# Patient Record
Sex: FEMALE | Race: WHITE | NOT HISPANIC OR LATINO | Employment: FULL TIME
[De-identification: names, ages, dates, MRNs, and addresses within clinical notes are randomized per-mention and may not be internally consistent; named-entity substitution may affect disease eponyms.]

---

## 2021-01-29 ENCOUNTER — TRANSCRIBE ORDERS (OUTPATIENT)
Dept: OTHER | Age: 61
End: 2021-01-29

## 2021-01-29 DIAGNOSIS — G25.0 ESSENTIAL TREMOR: Primary | ICD-10-CM

## 2021-03-05 ENCOUNTER — IMMUNIZATION (OUTPATIENT)
Dept: NURSING | Facility: CLINIC | Age: 61
End: 2021-03-05
Payer: COMMERCIAL

## 2021-03-05 PROCEDURE — 91301 PR COVID VAC MODERNA 100 MCG/0.5 ML IM: CPT

## 2021-03-05 PROCEDURE — 0011A PR COVID VAC MODERNA 100 MCG/0.5 ML IM: CPT

## 2021-03-13 ENCOUNTER — HEALTH MAINTENANCE LETTER (OUTPATIENT)
Age: 61
End: 2021-03-13

## 2021-04-02 ENCOUNTER — IMMUNIZATION (OUTPATIENT)
Dept: NURSING | Facility: CLINIC | Age: 61
End: 2021-04-02
Attending: INTERNAL MEDICINE
Payer: COMMERCIAL

## 2021-04-02 PROCEDURE — 91301 PR COVID VAC MODERNA 100 MCG/0.5 ML IM: CPT

## 2021-04-02 PROCEDURE — 0012A PR COVID VAC MODERNA 100 MCG/0.5 ML IM: CPT

## 2021-10-23 ENCOUNTER — HEALTH MAINTENANCE LETTER (OUTPATIENT)
Age: 61
End: 2021-10-23

## 2021-12-25 ENCOUNTER — HOSPITAL ENCOUNTER (EMERGENCY)
Facility: CLINIC | Age: 61
Discharge: HOME | End: 2021-12-26
Attending: EMERGENCY MEDICINE | Admitting: EMERGENCY MEDICINE
Payer: COMMERCIAL

## 2021-12-25 ENCOUNTER — APPOINTMENT (OUTPATIENT)
Dept: CT IMAGING | Facility: CLINIC | Age: 61
End: 2021-12-25
Attending: EMERGENCY MEDICINE
Payer: COMMERCIAL

## 2021-12-25 DIAGNOSIS — N20.1 URETEROLITHIASIS: ICD-10-CM

## 2021-12-25 DIAGNOSIS — N23 RENAL COLIC: ICD-10-CM

## 2021-12-25 DIAGNOSIS — R82.81 PYURIA: ICD-10-CM

## 2021-12-25 PROBLEM — R93.1 DECREASED CARDIAC EJECTION FRACTION: Status: ACTIVE | Noted: 2019-10-13

## 2021-12-25 PROBLEM — G25.0 ESSENTIAL TREMOR: Status: ACTIVE | Noted: 2018-02-14

## 2021-12-25 PROBLEM — E78.00 PURE HYPERCHOLESTEROLEMIA: Status: ACTIVE | Noted: 2020-07-30

## 2021-12-25 PROBLEM — F33.2 MDD (MAJOR DEPRESSIVE DISORDER), RECURRENT SEVERE, WITHOUT PSYCHOSIS (H): Status: ACTIVE | Noted: 2018-02-14

## 2021-12-25 PROBLEM — I47.10 PAROXYSMAL SVT (SUPRAVENTRICULAR TACHYCARDIA) (H): Status: ACTIVE | Noted: 2019-10-13

## 2021-12-25 PROBLEM — H52.213 IRREGULAR ASTIGMATISM, BILATERAL: Status: ACTIVE | Noted: 2021-02-24

## 2021-12-25 PROBLEM — R73.03 PREDIABETES: Status: ACTIVE | Noted: 2020-07-30

## 2021-12-25 PROBLEM — H52.4 PRESBYOPIA: Status: ACTIVE | Noted: 2021-02-24

## 2021-12-25 PROBLEM — I47.19 ATRIAL TACHYCARDIA (H): Status: ACTIVE | Noted: 2019-10-13

## 2021-12-25 PROBLEM — H52.13 MYOPIA, BILATERAL: Status: ACTIVE | Noted: 2021-02-24

## 2021-12-25 LAB
ALBUMIN SERPL-MCNC: 3.9 G/DL (ref 3.4–5)
ALP SERPL-CCNC: 135 U/L (ref 40–150)
ALT SERPL W P-5'-P-CCNC: 55 U/L (ref 0–50)
ANION GAP SERPL CALCULATED.3IONS-SCNC: 12 MMOL/L (ref 3–14)
AST SERPL W P-5'-P-CCNC: 40 U/L (ref 0–45)
BASOPHILS # BLD AUTO: 0.1 10E3/UL (ref 0–0.2)
BASOPHILS NFR BLD AUTO: 0 %
BILIRUB SERPL-MCNC: 0.8 MG/DL (ref 0.2–1.3)
BUN SERPL-MCNC: 21 MG/DL (ref 7–30)
CALCIUM SERPL-MCNC: 9.2 MG/DL (ref 8.5–10.1)
CHLORIDE BLD-SCNC: 108 MMOL/L (ref 94–109)
CO2 SERPL-SCNC: 18 MMOL/L (ref 20–32)
CREAT SERPL-MCNC: 0.99 MG/DL (ref 0.52–1.04)
EOSINOPHIL # BLD AUTO: 0.1 10E3/UL (ref 0–0.7)
EOSINOPHIL NFR BLD AUTO: 1 %
ERYTHROCYTE [DISTWIDTH] IN BLOOD BY AUTOMATED COUNT: 12.4 % (ref 10–15)
GFR SERPL CREATININE-BSD FRML MDRD: 65 ML/MIN/1.73M2
GLUCOSE BLD-MCNC: 174 MG/DL (ref 70–99)
HCT VFR BLD AUTO: 42.1 % (ref 35–47)
HGB BLD-MCNC: 14.1 G/DL (ref 11.7–15.7)
IMM GRANULOCYTES # BLD: 0.1 10E3/UL
IMM GRANULOCYTES NFR BLD: 1 %
LIPASE SERPL-CCNC: 74 U/L (ref 73–393)
LYMPHOCYTES # BLD AUTO: 1.9 10E3/UL (ref 0.8–5.3)
LYMPHOCYTES NFR BLD AUTO: 15 %
MCH RBC QN AUTO: 29.1 PG (ref 26.5–33)
MCHC RBC AUTO-ENTMCNC: 33.5 G/DL (ref 31.5–36.5)
MCV RBC AUTO: 87 FL (ref 78–100)
MONOCYTES # BLD AUTO: 1 10E3/UL (ref 0–1.3)
MONOCYTES NFR BLD AUTO: 8 %
NEUTROPHILS # BLD AUTO: 9.6 10E3/UL (ref 1.6–8.3)
NEUTROPHILS NFR BLD AUTO: 75 %
NRBC # BLD AUTO: 0 10E3/UL
NRBC BLD AUTO-RTO: 0 /100
PLATELET # BLD AUTO: 458 10E3/UL (ref 150–450)
POTASSIUM BLD-SCNC: 3.8 MMOL/L (ref 3.4–5.3)
PROT SERPL-MCNC: 7.6 G/DL (ref 6.8–8.8)
RBC # BLD AUTO: 4.84 10E6/UL (ref 3.8–5.2)
SODIUM SERPL-SCNC: 138 MMOL/L (ref 133–144)
WBC # BLD AUTO: 12.7 10E3/UL (ref 4–11)

## 2021-12-25 PROCEDURE — 96374 THER/PROPH/DIAG INJ IV PUSH: CPT | Mod: 59

## 2021-12-25 PROCEDURE — 85025 COMPLETE CBC W/AUTO DIFF WBC: CPT | Performed by: EMERGENCY MEDICINE

## 2021-12-25 PROCEDURE — 36415 COLL VENOUS BLD VENIPUNCTURE: CPT | Performed by: EMERGENCY MEDICINE

## 2021-12-25 PROCEDURE — 96376 TX/PRO/DX INJ SAME DRUG ADON: CPT

## 2021-12-25 PROCEDURE — 99285 EMERGENCY DEPT VISIT HI MDM: CPT | Mod: 25

## 2021-12-25 PROCEDURE — 81001 URINALYSIS AUTO W/SCOPE: CPT | Performed by: EMERGENCY MEDICINE

## 2021-12-25 PROCEDURE — 96375 TX/PRO/DX INJ NEW DRUG ADDON: CPT

## 2021-12-25 PROCEDURE — 87086 URINE CULTURE/COLONY COUNT: CPT | Performed by: EMERGENCY MEDICINE

## 2021-12-25 PROCEDURE — 250N000009 HC RX 250: Performed by: EMERGENCY MEDICINE

## 2021-12-25 PROCEDURE — 96361 HYDRATE IV INFUSION ADD-ON: CPT

## 2021-12-25 PROCEDURE — 74177 CT ABD & PELVIS W/CONTRAST: CPT

## 2021-12-25 PROCEDURE — 250N000011 HC RX IP 250 OP 636: Performed by: EMERGENCY MEDICINE

## 2021-12-25 PROCEDURE — 82040 ASSAY OF SERUM ALBUMIN: CPT | Performed by: EMERGENCY MEDICINE

## 2021-12-25 PROCEDURE — 258N000003 HC RX IP 258 OP 636: Performed by: EMERGENCY MEDICINE

## 2021-12-25 PROCEDURE — 83690 ASSAY OF LIPASE: CPT | Performed by: EMERGENCY MEDICINE

## 2021-12-25 RX ORDER — ONDANSETRON 2 MG/ML
4 INJECTION INTRAMUSCULAR; INTRAVENOUS EVERY 30 MIN PRN
Status: DISCONTINUED | OUTPATIENT
Start: 2021-12-25 | End: 2021-12-26 | Stop reason: HOSPADM

## 2021-12-25 RX ORDER — IOPAMIDOL 755 MG/ML
500 INJECTION, SOLUTION INTRAVASCULAR ONCE
Status: COMPLETED | OUTPATIENT
Start: 2021-12-25 | End: 2021-12-25

## 2021-12-25 RX ORDER — MORPHINE SULFATE 4 MG/ML
4 INJECTION, SOLUTION INTRAMUSCULAR; INTRAVENOUS
Status: COMPLETED | OUTPATIENT
Start: 2021-12-25 | End: 2021-12-25

## 2021-12-25 RX ORDER — KETOROLAC TROMETHAMINE 15 MG/ML
15 INJECTION, SOLUTION INTRAMUSCULAR; INTRAVENOUS ONCE
Status: COMPLETED | OUTPATIENT
Start: 2021-12-25 | End: 2021-12-25

## 2021-12-25 RX ADMIN — ONDANSETRON 4 MG: 2 INJECTION INTRAMUSCULAR; INTRAVENOUS at 22:16

## 2021-12-25 RX ADMIN — SODIUM CHLORIDE 1000 ML: 9 INJECTION, SOLUTION INTRAVENOUS at 22:37

## 2021-12-25 RX ADMIN — MORPHINE SULFATE 4 MG: 4 INJECTION INTRAVENOUS at 22:37

## 2021-12-25 RX ADMIN — KETOROLAC TROMETHAMINE 15 MG: 15 INJECTION, SOLUTION INTRAMUSCULAR; INTRAVENOUS at 23:07

## 2021-12-25 RX ADMIN — MORPHINE SULFATE 4 MG: 4 INJECTION INTRAVENOUS at 22:14

## 2021-12-25 RX ADMIN — MORPHINE SULFATE 4 MG: 4 INJECTION INTRAVENOUS at 22:58

## 2021-12-25 RX ADMIN — SODIUM CHLORIDE 61 ML: 9 INJECTION, SOLUTION INTRAVENOUS at 22:47

## 2021-12-25 RX ADMIN — IOPAMIDOL 83 ML: 755 INJECTION, SOLUTION INTRAVENOUS at 22:47

## 2021-12-25 ASSESSMENT — ENCOUNTER SYMPTOMS
NAUSEA: 1
ABDOMINAL PAIN: 1
BLOOD IN STOOL: 0
FEVER: 0
DIARRHEA: 0
VOMITING: 1
CHEST TIGHTNESS: 0
HEMATURIA: 1

## 2021-12-26 VITALS
HEART RATE: 97 BPM | SYSTOLIC BLOOD PRESSURE: 119 MMHG | OXYGEN SATURATION: 93 % | WEIGHT: 165 LBS | TEMPERATURE: 97 F | DIASTOLIC BLOOD PRESSURE: 74 MMHG | RESPIRATION RATE: 18 BRPM

## 2021-12-26 LAB
ALBUMIN UR-MCNC: 70 MG/DL
APPEARANCE UR: ABNORMAL
BILIRUB UR QL STRIP: NEGATIVE
COLOR UR AUTO: YELLOW
GLUCOSE UR STRIP-MCNC: NEGATIVE MG/DL
HGB UR QL STRIP: ABNORMAL
KETONES UR STRIP-MCNC: 60 MG/DL
LEUKOCYTE ESTERASE UR QL STRIP: ABNORMAL
MUCOUS THREADS #/AREA URNS LPF: PRESENT /LPF
NITRATE UR QL: NEGATIVE
PH UR STRIP: 6 [PH] (ref 5–7)
RBC URINE: 98 /HPF
SP GR UR STRIP: 1.03 (ref 1–1.03)
SQUAMOUS EPITHELIAL: 12 /HPF
UROBILINOGEN UR STRIP-MCNC: NORMAL MG/DL
WBC URINE: 178 /HPF

## 2021-12-26 PROCEDURE — 250N000013 HC RX MED GY IP 250 OP 250 PS 637: Performed by: EMERGENCY MEDICINE

## 2021-12-26 RX ORDER — ONDANSETRON 4 MG/1
4 TABLET, ORALLY DISINTEGRATING ORAL EVERY 8 HOURS PRN
Qty: 15 TABLET | Refills: 0 | Status: SHIPPED | OUTPATIENT
Start: 2021-12-26 | End: 2023-12-18

## 2021-12-26 RX ORDER — CEPHALEXIN 500 MG/1
500 CAPSULE ORAL 2 TIMES DAILY
Qty: 10 CAPSULE | Refills: 0 | Status: SHIPPED | OUTPATIENT
Start: 2021-12-26 | End: 2021-12-26

## 2021-12-26 RX ORDER — CEPHALEXIN 500 MG/1
500 CAPSULE ORAL ONCE
Status: DISCONTINUED | OUTPATIENT
Start: 2021-12-26 | End: 2021-12-26 | Stop reason: HOSPADM

## 2021-12-26 RX ORDER — OXYCODONE AND ACETAMINOPHEN 5; 325 MG/1; MG/1
1-2 TABLET ORAL EVERY 4 HOURS PRN
Qty: 8 TABLET | Refills: 0 | Status: SHIPPED | OUTPATIENT
Start: 2021-12-26 | End: 2023-12-18

## 2021-12-26 RX ORDER — CEPHALEXIN 250 MG/5ML
500 POWDER, FOR SUSPENSION ORAL 2 TIMES DAILY
Qty: 100 ML | Refills: 0 | Status: SHIPPED | OUTPATIENT
Start: 2021-12-26 | End: 2021-12-31

## 2021-12-26 RX ORDER — CEPHALEXIN 250 MG/5ML
500 POWDER, FOR SUSPENSION ORAL ONCE
Status: COMPLETED | OUTPATIENT
Start: 2021-12-26 | End: 2021-12-26

## 2021-12-26 RX ORDER — IBUPROFEN 600 MG/1
600 TABLET, FILM COATED ORAL EVERY 6 HOURS PRN
Qty: 30 TABLET | Refills: 0 | Status: SHIPPED | OUTPATIENT
Start: 2021-12-26 | End: 2022-01-25

## 2021-12-26 RX ADMIN — CEPHALEXIN 500 MG: 250 POWDER, FOR SUSPENSION ORAL at 01:19

## 2021-12-26 ASSESSMENT — ENCOUNTER SYMPTOMS
APPETITE CHANGE: 0
MYALGIAS: 0

## 2021-12-26 NOTE — ED TRIAGE NOTES
"Pt arrives to the ED due to LLQ abdominal pain that has been present since this AM but got worse this evening 30 min prior to arrival. Pt hyperventilating in triage and writhing around in pain, stating \"hurry hurry\". Pt states multiple episodes of dry heaving at home. H/o of diverticulitis. States this feels similar.   "

## 2021-12-26 NOTE — ED PROVIDER NOTES
History   Chief Complaint:  Abdominal Pain    The history is provided by the patient.      Aida Zhong is a 61 year old female who presents with abdominal pain. She developed abdominal pain this morning and states it got unbearable about an hour ago and also has a sharp stabbing pain in bladder. The pain started in the left abodmen and notes that nothing makes the pain worse. She has associated nausea and vomiting. She also has back discomfort. Aida has been having pink urine but does not have any blood in the stool. She does not have diarrhea or a fever. She denies having a lose stool. Aida does not have upper abdominal pain or chest pain. She also denies vaginal bleeding. She has had a hysterectomy which included her ovaries. She is unsure if she ever had a kidney stone. She states that 3 weeks ago she had diverticulitis but it went away without having to take antibiotics and is concerned if she could be having that now. She denies any fever. No trauma or injury. She denies any other symptoms.    Review of Systems   Constitutional: Negative for appetite change and fever.   Respiratory: Negative for chest tightness.    Gastrointestinal: Positive for abdominal pain, nausea and vomiting. Negative for blood in stool and diarrhea.   Genitourinary: Positive for hematuria. Negative for vaginal bleeding.   Musculoskeletal: Negative for myalgias.   Neurological: Negative for syncope.   All other systems reviewed and are negative.      Allergies:  Penicillins    Medications:   FLUoxetine  metoprolol succinate   methylPREDNISolone    Past Medical History:     Prediabetes  Pure hypercholesterolemia  Paroxysmal SVT  MDD  (major depressive disorder), recurrent severe, without psychosis  Irregular astigmatism, bilateral  Essential tremor  Atrial tachycardia  Decreased cardiac ejection fraction    Past Surgical History:    HYSTERECTOMY  EP AV NODE ABLATION     Family History:    SVT  Heart attack  Breast  cancer  Stroke    Social History:  Presents alone  PCP: Tabitha Ruiz    Physical Exam     Patient Vitals for the past 24 hrs:   BP Temp Temp src Pulse Resp SpO2 Weight   12/26/21 0119 119/74 -- -- 97 -- 93 % --   12/26/21 0053 118/71 -- -- 102 -- 93 % --   12/26/21 0009 117/82 -- -- 104 18 96 % --   12/25/21 2224 -- -- -- -- 24 98 % --   12/25/21 2222 (!) 140/83 -- -- -- (!) 32 97 % --   12/25/21 2152 122/82 97  F (36.1  C) Temporal 85 30 100 % 74.8 kg (165 lb)       Physical Exam  General: Nontoxic. Appears in distress due to pain.   Head:  Scalp, face, and head appear normal  Eyes:  Pupils are equal, round    Conjunctivae non-injected and sclerae white  ENT:    The external nose is normal    Pinnae are normal  Neck:  Normal range of motion    There is no rigidity noted    Trachea is in the midline  CV:  Tachycardic rate, regular rhythm     Normal S1/S2, no S3/S4    No murmur or rub. Radial pulses 2+ bilaterally.  Resp:  Lungs are clear and equal bilaterally  There is no tachypnea    No increased work of breathing    No rales, wheezing, or rhonchi  GI:  Abdomen is soft, no rigidity or guarding    No distension, or mass    Significant LUQ and LLQ tenderness. No rebound tenderness   MS:  Normal muscular tone    Symmetric motor strength    No lower extremity edema  Skin:  No rash or acute skin lesions noted  Neuro: Awake and alert  Speech is normal and fluent  Moves all extremities spontaneously  Psych:  Anxious affect. Appropriate interactions.      Emergency Department Course     Imaging:  CT Abdomen Pelvis w Contrast   Final Result   IMPRESSION:    1.  3 mm stone distal left ureter near the UVJ with mild left-sided hydronephrosis.      2.  Colonic diverticula without CT evidence for diverticulitis. No appendicitis.      3.  Fatty liver.        Report per radiology    Laboratory:  Labs Ordered and Resulted from Time of ED Arrival to Time of ED Departure   COMPREHENSIVE METABOLIC PANEL - Abnormal        Result Value    Sodium 138      Potassium 3.8      Chloride 108      Carbon Dioxide (CO2) 18 (*)     Anion Gap 12      Urea Nitrogen 21      Creatinine 0.99      Calcium 9.2      Glucose 174 (*)     Alkaline Phosphatase 135      AST 40      ALT 55 (*)     Protein Total 7.6      Albumin 3.9      Bilirubin Total 0.8      GFR Estimate 65     ROUTINE UA WITH MICROSCOPIC REFLEX TO CULTURE - Abnormal    Color Urine Yellow      Appearance Urine Slightly Cloudy (*)     Glucose Urine Negative      Bilirubin Urine Negative      Ketones Urine 60  (*)     Specific Gravity Urine 1.029      Blood Urine Large (*)     pH Urine 6.0      Protein Albumin Urine 70  (*)     Urobilinogen Urine Normal      Nitrite Urine Negative      Leukocyte Esterase Urine Large (*)     Mucus Urine Present (*)     RBC Urine 98 (*)     WBC Urine 178 (*)     Squamous Epithelials Urine 12 (*)    CBC WITH PLATELETS AND DIFFERENTIAL - Abnormal    WBC Count 12.7 (*)     RBC Count 4.84      Hemoglobin 14.1      Hematocrit 42.1      MCV 87      MCH 29.1      MCHC 33.5      RDW 12.4      Platelet Count 458 (*)     % Neutrophils 75      % Lymphocytes 15      % Monocytes 8      % Eosinophils 1      % Basophils 0      % Immature Granulocytes 1      NRBCs per 100 WBC 0      Absolute Neutrophils 9.6 (*)     Absolute Lymphocytes 1.9      Absolute Monocytes 1.0      Absolute Eosinophils 0.1      Absolute Basophils 0.1      Absolute Immature Granulocytes 0.1      Absolute NRBCs 0.0     LIPASE - Normal    Lipase 74     URINE CULTURE        Emergency Department Course:    Reviewed:  I reviewed nursing notes, vitals, past medical history and Care Everywhere    Assessments:  2019 I obtained history and examined the patient as noted above.   0006 I rechecked the patient and explained findings.       Interventions:  2214    Morphine, 4 mg, IV  2216    Zofran, 4 mg, IV  2237    Morphine, 4 mg, IV  2237    Sodium chloride BOLUS, 1,000 mL, IV  2258    Morphine, 4 mg, IV  2307     Ketorolac, 15 mg, IV    Disposition:  The patient was discharged to home.     Impression & Plan     Medical Decision Making:  Aida Zhong is a 61 year old female who presents complaining of LLQ and left flank pain. On my evaluation the patient is well appearing, hemodynamically stable and afebrile. Abdominal exam is benign without evidence of peritonitis or acute surgical emergency. Evaluation today consistent with nephrolithiasis and renal colic. Based on history and exam and the above studies, I do not feel that there is evidence of other acute intraabdominal process at this time.  CT scan showed left UVJ stone. No MARTINA. UA was obtained and reveals significant pyuria. Patient with mild leukocytosis on CBC. Will treat with keflex for possible infection. No evidence of sepsis. Urine culture sent.  The patient received the above interventions and felt much improved and appropriate for discharge home.  Patient be discharged home with ibuprofen, oxycodone for breakthrough pain, Zofran for nausea.  Based on the patient's good response to symptomatic control, I feel that this patient can be managed expectantly with close outpatient follow up within the next several days.  The patient was instructed to return to the emergency department for uncontrolled pain, fever or inability to tolerate oral liquids.  Patient agrees with the plan and all questions and concerns addressed prior to discharge home.  Patient discharged in stable and improved condition.    Diagnosis:    ICD-10-CM    1. Ureterolithiasis  N20.1    2. Renal colic  N23    3. Pyuria  R82.81        Discharge Medications:  Discharge Medication List as of 12/26/2021 12:50 AM      START taking these medications    Details   ibuprofen (ADVIL/MOTRIN) 600 MG tablet Take 1 tablet (600 mg) by mouth every 6 hours as needed for other (pain, aches, fever) Take with food., Disp-30 tablet, R-0, Local Print      ondansetron (ZOFRAN ODT) 4 MG ODT tab Take 1 tablet (4 mg) by  mouth every 8 hours as needed for nausea or vomiting, Disp-15 tablet, R-0, Local PrintMay substitute non-ODT formulation per patient preference/insurance coverage.      oxyCODONE-acetaminophen (PERCOCET) 5-325 MG tablet Take 1-2 tablets by mouth every 4 hours as needed for breakthrough pain or severe pain, Disp-8 tablet, R-0, Local Print      cephALEXin (KEFLEX) 500 MG capsule Take 1 capsule (500 mg) by mouth 2 times daily for 5 days, Disp-10 capsule, R-0, Local Print             Scribe Disclosure:  DEXTER, Isis Meza, am serving as a scribe at 10:19 PM on 12/25/2021 to document services personally performed by Enzo Toledo MD based on my observations and the provider's statements to me.              Enzo Toledo MD  12/26/21 4618

## 2021-12-27 NOTE — RESULT ENCOUNTER NOTE
[facility] Emergency Dept discharge antibiotic (if prescribed): Cephalexin (Keflex) 250 MG/5ML susp,  10 mLs (500 mg) by mouth 2 times daily for 5 days   Date of Rx (if applicable):  12/26/21  No changes in treatment per [facility] ED Lab Result Urine culture protocol.

## 2021-12-28 LAB — BACTERIA UR CULT: ABNORMAL

## 2022-04-09 ENCOUNTER — HEALTH MAINTENANCE LETTER (OUTPATIENT)
Age: 62
End: 2022-04-09

## 2022-10-09 ENCOUNTER — HEALTH MAINTENANCE LETTER (OUTPATIENT)
Age: 62
End: 2022-10-09

## 2023-02-14 ENCOUNTER — HOSPITAL ENCOUNTER (EMERGENCY)
Facility: CLINIC | Age: 63
Discharge: LEFT BEFORE DISCHARGE | End: 2023-02-14
Admitting: EMERGENCY MEDICINE
Payer: COMMERCIAL

## 2023-02-14 VITALS
HEART RATE: 81 BPM | RESPIRATION RATE: 20 BRPM | OXYGEN SATURATION: 97 % | DIASTOLIC BLOOD PRESSURE: 66 MMHG | SYSTOLIC BLOOD PRESSURE: 132 MMHG | TEMPERATURE: 97.3 F

## 2023-02-14 LAB
ALBUMIN UR-MCNC: 30 MG/DL
APPEARANCE UR: ABNORMAL
BACTERIA #/AREA URNS HPF: ABNORMAL /HPF
BILIRUB UR QL STRIP: NEGATIVE
COLOR UR AUTO: ABNORMAL
GLUCOSE UR STRIP-MCNC: NEGATIVE MG/DL
HGB UR QL STRIP: ABNORMAL
KETONES UR STRIP-MCNC: NEGATIVE MG/DL
LEUKOCYTE ESTERASE UR QL STRIP: ABNORMAL
MUCOUS THREADS #/AREA URNS LPF: PRESENT /LPF
NITRATE UR QL: NEGATIVE
PH UR STRIP: 5.5 [PH] (ref 5–7)
RBC URINE: >182 /HPF
SP GR UR STRIP: 1.02 (ref 1–1.03)
UROBILINOGEN UR STRIP-MCNC: NORMAL MG/DL
WBC URINE: 47 /HPF

## 2023-02-14 PROCEDURE — 87086 URINE CULTURE/COLONY COUNT: CPT | Performed by: EMERGENCY MEDICINE

## 2023-02-14 PROCEDURE — 999N000104 HC STATISTIC NO CHARGE

## 2023-02-14 PROCEDURE — 81001 URINALYSIS AUTO W/SCOPE: CPT | Performed by: EMERGENCY MEDICINE

## 2023-02-15 NOTE — ED TRIAGE NOTES
Left sided Abdominal and back pain - patient states feels similar to kidney stone she had last year. Blood in urine since Sunday.   ABC intact alert and no distress.     Triage Assessment     Row Name 02/14/23 0157       Triage Assessment (Adult)    Airway WDL WDL       Respiratory WDL    Respiratory WDL WDL       Cardiac WDL    Cardiac WDL WDL       Cognitive/Neuro/Behavioral WDL    Cognitive/Neuro/Behavioral WDL WDL

## 2023-02-16 LAB — BACTERIA UR CULT: NORMAL

## 2023-03-25 ENCOUNTER — HEALTH MAINTENANCE LETTER (OUTPATIENT)
Age: 63
End: 2023-03-25

## 2023-05-21 ENCOUNTER — HEALTH MAINTENANCE LETTER (OUTPATIENT)
Age: 63
End: 2023-05-21

## 2023-08-07 ENCOUNTER — APPOINTMENT (OUTPATIENT)
Dept: GENERAL RADIOLOGY | Facility: CLINIC | Age: 63
End: 2023-08-07
Attending: EMERGENCY MEDICINE
Payer: COMMERCIAL

## 2023-08-07 VITALS
TEMPERATURE: 98.7 F | WEIGHT: 165.34 LBS | OXYGEN SATURATION: 95 % | SYSTOLIC BLOOD PRESSURE: 130 MMHG | HEART RATE: 86 BPM | RESPIRATION RATE: 20 BRPM | DIASTOLIC BLOOD PRESSURE: 79 MMHG

## 2023-08-07 PROCEDURE — 71101 X-RAY EXAM UNILAT RIBS/CHEST: CPT | Mod: RT

## 2023-08-07 PROCEDURE — 99284 EMERGENCY DEPT VISIT MOD MDM: CPT

## 2023-08-07 PROCEDURE — 73610 X-RAY EXAM OF ANKLE: CPT | Mod: RT

## 2023-08-08 ENCOUNTER — HOSPITAL ENCOUNTER (EMERGENCY)
Facility: CLINIC | Age: 63
Discharge: HOME | End: 2023-08-08
Attending: EMERGENCY MEDICINE | Admitting: EMERGENCY MEDICINE
Payer: COMMERCIAL
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
## 2023-08-08 DIAGNOSIS — S20.211A RIB CONTUSION, RIGHT, INITIAL ENCOUNTER: ICD-10-CM

## 2023-08-08 DIAGNOSIS — S92.251G: ICD-10-CM
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
## 2023-08-08 RX ORDER — HYDROCODONE BITARTRATE AND ACETAMINOPHEN 5; 325 MG/1; MG/1
1 TABLET ORAL EVERY 6 HOURS PRN
Qty: 7 TABLET | Refills: 0 | Status: SHIPPED | OUTPATIENT
Start: 2023-08-08 | End: 2023-08-11

## 2023-08-08 NOTE — ED PROVIDER NOTES
"  History     Chief Complaint:  Fall       The history is provided by the patient.      Aida Zhong is a 63 year old female with history of hyperlipidemia who presents with right foot pain and swelling after a fall that occurred last week. Patient reports that she was walking up her concrete steps in her garage, when her right heal got caught and she fell in a plantarflexed position. During the fall, she heard a \"crack\" noise. Additionally, she fell onto her right side hitting her head and right side of her chest. She reports right rib pain. Initially, she had neck pain, but this has since resolved. No loss of consciousness. She has been walking on her own, but reports increased swelling and pain. She has been using ibuprofen and Aspirin as well as elevation, but all methods have provided incomplete relief.  Outside of this, she reports that her pain is worse in the morning and is accompanied by tingling. No loss of consciousness, headache, nausea, or vomiting. The patient is not anticoagulated.     Independent Historian:   None - Patient Only    Review of External Notes:   None     Medications:    Zofran   Percocet  Prozac    Past Medical History:    Atrial tachycardia  Decreased cardiac ejection fraction  Essential tremor  Irregular astigmatism, bilateral  Depression   Myopia, bilateral  Paroxysmal SVT   Prediabetes  Presbyopia  Hyperlipidemia   Non-ischemic cardiomyopathy   Corneal distortion     Past Surgical History:    Hysterectomy  AV node ablation  Colonoscopy     Physical Exam   Patient Vitals for the past 24 hrs:   BP Temp Temp src Pulse Resp SpO2 Weight   08/07/23 2311 130/79 98.7  F (37.1  C) Temporal 86 20 95 % 75 kg (165 lb 5.5 oz)      Physical Exam  General: the patient is awake and interactive  HEENT:  Atraumatic. Moist mucous membranes, conjunctiva normal  Pulmonary:  Normal respiratory effort  Cardiovascular:  Well perfused  Musculoskeletal:  Moving 4 extremities grossly wnl, no deformities; " right lower lateral rib tenderness; no chest wall crepitus.  No cervical midline tenderness  Abdomen: Soft, nontender, nondistended. No guarding/rebound.   Right ankle - Tenderness over the medial/lateral malleoli.  Dorsal foot swelling and tenderness to the proximal midfoot.  Nontender at base of the 5th metatarsal.  Pain with midfoot squeeze.  No tenderness over proximal fibula.  5/5 strength dorsiflexion/plantar flexion.  Normal sensation to light touch in foot.  Capillary refill < 2 seconds, DP/PT pulse 2+; bruising noted to the heel, no calcaneus tenderness.   Neuro:  Speech normal, no focal deficits; GCS 15.     Emergency Department Course     Imaging:  Ribs XR, unilat 3 views + PA chest, right   Final Result   IMPRESSION: The visualized heart and lungs are negative. No rib fractures.      Ankle XR, G/E 3 views, right   Final Result   IMPRESSION: The bones are well-mineralized. There is a small ossific fragment seen dorsal to the navicula which hasn't corticated margins consistent with a dorsal avulsion fracture. Adjacent soft tissue swelling is also present. The ankle mortise is    well-maintained on these nonstress views. Bimalleolar and anterior ankle joint swelling is present.         Report per radiology    Procedures     Splint Placement     Procedure: Splint Placement     Indication: Fracture    Consent: Verbal     Location: Right Foot    Preparation: Wounds were cleansed and dressed with a non-adherent bandage     Procedure detail:   Splint was applied by Tech/Nurse with my assistance  Splint type: Short leg   Splint materilal: Fiberglass  After placement I checked and adjusted the fit as needed to ensure proper positioning/fit   Sensation and circulation are intact after splint placement     Patient Status: The patient tolerated the procedure well: Yes. There were no complications.      Emergency Department Course & Assessments:       Assessments:  0132 I obtained history and examined the patient as  noted above.  0151 I rechecked the patient and performed a splint procedure.    Independent Interpretation (X-rays, CTs, rhythm strip):  None    Social Determinants of Health affecting care:   None    Disposition:  The patient was discharged to home.     Impression & Plan      Medical Decision Makin-year-old female with history of hyperlipidemia presenting to the ER for evaluation of right foot pain and the right side rib pain after fall last week.  Please above for details of HPI and exam.  She reports head injury but no loss of consciousness or blood thinner use.  No headaches, nausea/vomiting.  No indication for head CT at this time is a very low suspicion for intracranial bleed.  Cervical spine is cleared clinically.  Chest radiograph without evidence of rib fracture or pneumothorax or other acute pathology in the thorax.  Suspect right rib contusion.  Radiograph of the ankle concerning for navicular dorsal avulsion fracture.  She is otherwise CMS intact to the affected foot.  The rest of her extremity exam was unremarkable for other injury.  No signs of compartment syndrome.  Recommend NWB, crutches, RICE, Tylenol/ibuprofen and will provide short course of pain medication for breakthrough/severe pain.  Open discussion had at bedside.  We will have the patient follow-up with orthopedics given the above findings.  She is comfortable with this plan.  Discussed return precautions for the emergency department.  All questions were answered prior to discharge.    Diagnosis:    ICD-10-CM    1. Avulsion fracture of navicular bone of foot with delayed healing, right  S92.251G Crutches Order      2. Rib contusion, right, initial encounter  S20.211A       3. Fall at home, initial encounter  W19.XXXA     Y92.009          Discharge Medications:  Discharge Medication List as of 2023  2:03 AM        START taking these medications    Details   HYDROcodone-acetaminophen (NORCO) 5-325 MG tablet Take 1 tablet by mouth  every 6 hours as needed for severe pain, Disp-7 tablet, R-0, Local Print            Scribe Disclosure:  I, Fuentes Dillonhung, am serving as a scribe at 1:55 AM on 8/8/2023 to document services personally performed by Flex Escamilla MD, based on my observations and the provider's statements to me.   8/8/2023   Flex Escamilla MD [place], Edgar Ronald, MD  08/08/23 0514

## 2023-08-08 NOTE — DISCHARGE INSTRUCTIONS

## 2023-08-08 NOTE — ED TRIAGE NOTES
Pt arrives with right ankle pain and right rib pain that started after falling a week prior. Pt states she does not have any other concerns. When asked pt states she did hit her head but states no neuro changes, no loc or thinners. ABCs intact and Aox4.      Triage Assessment       Row Name 08/07/23 4704       Triage Assessment (Adult)    Airway WDL WDL       Respiratory WDL    Respiratory WDL WDL       Peripheral/Neurovascular WDL    Peripheral Neurovascular WDL WDL

## 2023-10-10 ENCOUNTER — MEDICAL CORRESPONDENCE (OUTPATIENT)
Dept: HEALTH INFORMATION MANAGEMENT | Facility: CLINIC | Age: 63
End: 2023-10-10
Payer: COMMERCIAL

## 2023-10-12 ENCOUNTER — TRANSCRIBE ORDERS (OUTPATIENT)
Dept: NEUROLOGY | Facility: CLINIC | Age: 63
End: 2023-10-12
Payer: COMMERCIAL

## 2023-10-12 DIAGNOSIS — G25.0 ESSENTIAL TREMOR: Primary | ICD-10-CM

## 2023-12-18 ENCOUNTER — OFFICE VISIT (OUTPATIENT)
Dept: NEUROLOGY | Facility: CLINIC | Age: 63
End: 2023-12-18
Payer: COMMERCIAL

## 2023-12-18 VITALS
OXYGEN SATURATION: 97 % | SYSTOLIC BLOOD PRESSURE: 125 MMHG | DIASTOLIC BLOOD PRESSURE: 82 MMHG | WEIGHT: 167.7 LBS | HEART RATE: 91 BPM | BODY MASS INDEX: 26.95 KG/M2 | HEIGHT: 66 IN

## 2023-12-18 DIAGNOSIS — G25.0 ESSENTIAL TREMOR: Primary | ICD-10-CM

## 2023-12-18 PROCEDURE — 99204 OFFICE O/P NEW MOD 45 MIN: CPT | Performed by: PSYCHIATRY & NEUROLOGY

## 2023-12-18 RX ORDER — ROSUVASTATIN CALCIUM 5 MG/1
1 TABLET, COATED ORAL AT BEDTIME
COMMUNITY
Start: 2023-11-21

## 2023-12-18 RX ORDER — FLUOXETINE 40 MG/1
CAPSULE ORAL
COMMUNITY
Start: 2023-11-21

## 2023-12-18 ASSESSMENT — ACTIVITIES OF DAILY LIVING (ADL)
FEEDING_WITH_A_SPOON: (3) MODERATELY ABNORMAL. SPILLS A LOT OR CHANGES STRATEGY TO COMPLETE TASK SUCH AS USING TWO HANDS OR LEANING OVER.
DRESS: (2) MILDLY ABNORMAL. ABLE TO DO EVERYTHING BUT HAS DIFFICULTY DUE TO TREMOR.
OVERALL_DISABILITY_WITH_THE_MOST_AFFECTED_TASK: (4) SEVERELY ABNORMAL. CANNOT DO THE TASK.
SPEAKING: (0) NORMAL
USING_KEYS: (3) MODERATELY ABNORMAL. NEEDS TO USE TWO HANDS OR OTHER STRATEGIES TO PUT KEY IN LOCK.
POURING: (3) MODERATELY ABNORMAL. MUST USE TWO HANDS OR USES OTHER STRATEGIES TO AVOID SPILLING.
TOTAL_SCORE: 32
HYGIENE: (2) MILDLY ABNORMAL. SOME DIFFICULTY BUT CAN COMPLETE TASK.
WRITING: (2) MILDLY ABNORMAL. DIFFICULTY WRITING DUE TO THE TREMOR.
WORKING: (3) MODERATELY ABNORMAL. UNABLE TO CONTINUE WORKING WITHOUT USING STRATEGIES SUCH AS CHANGING JOBS OR USING SPECIAL EQUIPMENT.
CARRYING_FOOD_TRAYS_PLATES_OR_SIMILAR_ITEMS: (2) MILDLY ABNORMAL. MUST BE VERY CAREFUL TO AVOID SPILLING ITEMS ON FOOD TRAY.
DRINKING_FROM_A_GLASS: (4) SEVERELY ABNORMAL. CANNOT DRINK FROM A GLASS OR USES STRAW OR SIPPY CUP.
SOCIAL_IMPACT: (4) AVOIDS PARTICIPATING IN MOST SOCIAL SITUATIONS OR PROFESSIONAL MEETINGS BECAUSE OF TREMOR.

## 2023-12-18 NOTE — PROGRESS NOTES
Department of Neurology  Movement Disorders Division   New Patient Visit    Patient: Aida Zhong   MRN: [id number]   : 1960   Date of Visit: 2023  PCP & referring provider: SHANIQUE Rasheed     CC:  Essential tremor     HPI:  Ms. Zhong is a 63 year old left-handed female with history significant for essential tremor who presents to movement disorder clinic to establish care. She is unaccompanied.    She reports the tremor has worsened to the point she is unable to keep up with her job. She first noticed tremors when she was 15. The tremor now is in bilateral hands & legs (mainly in her lies). She has an internal tremor as well that will cause her to jerk. Alcohol helps the tremor.     She has tried metoprolol, propranolol, vitamins, and alcohol. She has never tried primidone or topiramate. She has had multiple kidney stones in the past. She has tried weighted gloves. She has utensils at home that are weighted. She has bigger pens.     Sister, brother, mother, maternal grandfather, daughter with tremor.         2023     8:00 AM   Tremor ADL Scale   1. Speaking 0   2. Feeding (spoon) 3   3. Drinking (glass) 4   4. Hygiene 2   5. Dressing 2   6. Pouring 3   7. Carry plate, tray 2   8. Use keys 3   9. Writing 2   10. Work or housework 3   11a. Overall disability 4   11b. Most affected task serving   12. Social impact 4   ADL TOTAL 32      MEDICATIONS reviewed & pertinent for:  None    ROS:  All others negative except as listed above.      Current Outpatient Medications:     FLUoxetine (PROZAC) 20 MG capsule, Take one capsule (20mg) daily, along with 40mg capsule daily, for total daily dose of 60mg., Disp: , Rfl:     FLUoxetine (PROZAC) 40 MG capsule, Take one capsule (40mg) daily, along with 20mg capsule daily, for total daily dose of 60mg., Disp: , Rfl:     rosuvastatin (CRESTOR) 5 MG tablet, Take 1 tablet by mouth at bedtime, Disp: , Rfl:      Allergies   Allergen Reactions    Penicillins  "Rash        History reviewed. No pertinent family history.     Social History:  She  reports that she has never smoked. She has never used smokeless tobacco. She reports current alcohol use. She reports that she does not use drugs.     PHYSICAL EXAM:  /82   Pulse 91   Ht 1.676 m (5' 6\")   Wt 76.1 kg (167 lb 11.2 oz)   SpO2 97%   BMI 27.07 kg/m       Gen: alert, active, attentive, appropriately groomed     NEURO:  MS: Alert and oriented to person, place, time, and situation.  Speech normal to comprehension.  Recent and remote memory intact.  Attention and concentration normal.  Fund of knowledge normal.    CN:  Pupils equal, round, and reactive to light. VFF. EOM normal range, no nystagmus.  Normal saccades. Facial sensation intact. Face symmetric at rest and with activation. Hearing grossly intact to conversation. No dysarthria or hypophonia. Shoulder shrug symmetric and without lag bilaterally. Tongue protrudes midline, side to side without hesitation. No fasciculation or atrophy noted.    Motor:  Normal bulk and tone throughout upper and lower extremities.  See TETRAS. Strength is 5/5 throughout and symmetric.     Sensation:  Intact to LT in all extremities.    Coordination:  FTN without dysmetria bilaterally.    Gait:  Normal gait. Good stride length & armswing bilaterally. Normal turn.         12/18/2023     8:00 AM   Tremor Motor Scale   Assessment Time 08:39   Medication None   DBS - Right Brain None   DBS - Left Brain None   Head 1   Face & Jaw 0   Voice 0   Outstretched - RIGHT 1.5   Outstretched - LEFT 2   Wingbeating - RIGHT 2   Wingbeating - LEFT 2   Kinetic - RIGHT 2   Kinetic - LEFT 2.5   Lower Limb - RIGHT 1   Lower Limb - LEFT 1   Lower Limb (Max) 1   Spiral - RIGHT 3   Spiral - LEFT 2   Handwriting 0   Dot approx - RIGHT 2.5   Dot approx - LEFT 2   Trunk (Standing) 2   Total Right 12   Total Left 11.5   Axial 1   TOTAL 25.5        DATA REVIEWED:  Most recent TSH in 2020 within normal " limits     ASSESSMENT:  Ms. Zhong is a 63 year old left-handed female with history significant for essential tremor who presents to movement disorder clinic to establish care. Exam is consistent with essential tremor. 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 She has already tried propranolol without benefit (unclear dose) and is not a candidate for topiramate with history of nephrolithiasis. She has not yet tried primidone so we will plan to start that, presuming that her LFT's are reassuring. I also provided her with a form for [employer]. We also discussed potential future surgical options, including DBS & focused ultrasound.    PLAN:  - lab: CMP  - pending labs start primidone 25 mg at bedtime and titrate up as needed/tolerated  - [employer] form provided    RTC 3 months, 30 min     Jessika Moon MD   of Neurology  [facility]  Department of Neurology       53 minutes spent on the date of the encounter doing chart review, history and exam, documentation and further activities as noted above.

## 2023-12-18 NOTE — PATIENT INSTRUCTIONS
Let's check your liver function before we start the medication, primidone, but assuming this is fine, we will slowly increase as below. If you feel like your symptoms are controlled, you can stop increasing at the lowest effective dose. If you develop any side effects, decrease to the previous dose and please let me know.    Week 1: primidone 1/2 tab (25 mg) at bedtime   Week 2: primidone 1 tab (50 mg) at bedtime   Week 3: primidone 1 and 1/2 tab (75 mg) at bedtime  Week 4: primidone 2 tab (100 mg) at bedtime     After you have been on this dose for 1-2 weeks, please update me on how you are doing.     I will give you the information for [employer].

## 2023-12-18 NOTE — NURSING NOTE
"Aida Zhong is a 63 year old female who presents for:  Chief Complaint   Patient presents with    Tremors     Essential tremor / Ref by Nasreen Carrillo        Initial Vitals:  /82   Pulse 91   Ht 1.676 m (5' 6\")   Wt 76.1 kg (167 lb 11.2 oz)   SpO2 97%   BMI 27.07 kg/m   Estimated body mass index is 27.07 kg/m  as calculated from the following:    Height as of this encounter: 1.676 m (5' 6\").    Weight as of this encounter: 76.1 kg (167 lb 11.2 oz).. Body surface area is 1.88 meters squared. BP completed using cuff size: claire Walker CMA    "

## 2024-02-02 ENCOUNTER — MYC MEDICAL ADVICE (OUTPATIENT)
Dept: NEUROLOGY | Facility: CLINIC | Age: 64
End: 2024-02-02
Payer: COMMERCIAL

## 2024-02-05 NOTE — TELEPHONE ENCOUNTER
Faxed Form February 5, 2024 to fax number [phone]    Right Fax confirmed at 1:33 AM    Jonelle Maher MA

## 2024-02-05 NOTE — TELEPHONE ENCOUNTER
Called patient and left message for her to respond to [employer] message so she can send me the email she would like to have her FMLA sent to.        [facility] [place]

## 2024-04-05 ENCOUNTER — TELEPHONE (OUTPATIENT)
Dept: NEUROLOGY | Facility: CLINIC | Age: 64
End: 2024-04-05
Payer: COMMERCIAL

## 2024-04-05 NOTE — TELEPHONE ENCOUNTER
Left voicemail for patient with appointment reminder for 4/8/24 with Dr Moon at 10:30am.    Advised patient to call [phone] with any questions.

## 2024-04-08 ENCOUNTER — OFFICE VISIT (OUTPATIENT)
Dept: NEUROLOGY | Facility: CLINIC | Age: 64
End: 2024-04-08
Payer: COMMERCIAL

## 2024-04-08 VITALS
WEIGHT: 165 LBS | HEART RATE: 111 BPM | OXYGEN SATURATION: 100 % | DIASTOLIC BLOOD PRESSURE: 81 MMHG | HEIGHT: 67 IN | SYSTOLIC BLOOD PRESSURE: 123 MMHG | BODY MASS INDEX: 25.9 KG/M2

## 2024-04-08 DIAGNOSIS — G25.0 ESSENTIAL TREMOR: Primary | ICD-10-CM

## 2024-04-08 PROCEDURE — 99214 OFFICE O/P EST MOD 30 MIN: CPT | Performed by: PSYCHIATRY & NEUROLOGY

## 2024-04-08 RX ORDER — PRIMIDONE 50 MG/1
TABLET ORAL
Qty: 30 TABLET | Refills: 11 | Status: SHIPPED | OUTPATIENT
Start: 2024-04-08 | End: 2024-05-31

## 2024-04-08 NOTE — LETTER
"    2024         RE: Aida Zhong  [address]  [facility] [address]        Dear Colleague,    Thank you for referring your patient, Aida Zhong, to the [facility]UROLOG[facility]. Please see a copy of my visit note below.    Department of Neurology  Movement Disorders Division   Follow-up Note    Patient: Aida Zhong   MRN: [id number]   : 1960   Date of Visit: 2024    Ms. Zhong is a 64 year old left-handed female who presents for follow-up of essential tremor. She was last seen on 2024, at which time she was prescribed primidone. Today, she is unaccompanied.    INTERVAL EVENTS:  Since last visit, she reports that she's dropping things more and having more \"jerky\" movements  in the morning. 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 She has financial hardship from this. She has not started the primidone due to not being able to get the lab.     She has also been struggling with sciatica - has walker today for this due to pain with walking.     MEDICATIONS reviewed & pertinent for:  Fluoxetine   Primidone - not yet started.    Previous medications notable for:   Metoprolol & propranolol - side effects of fatigue, somnolence, worsening depression    ROS:  All others negative except as listed above.    Current Outpatient Medications   Medication Sig Dispense Refill     FLUoxetine (PROZAC) 20 MG capsule Take one capsule (20mg) daily, along with 40mg capsule daily, for total daily dose of 60mg.       FLUoxetine (PROZAC) 40 MG capsule Take one capsule (40mg) daily, along with 20mg capsule daily, for total daily dose of 60mg.       primidone (MYSOLINE) 50 MG tablet Week 1: 25 mg at bedtime; week 2: 50 mg at bedtime; week 3: 75 mg at bedtime; week 4: 100 mg at bedtime 30 tablet 11     rosuvastatin (CRESTOR) 5 MG tablet Take 1 tablet by mouth at bedtime         Allergies   Allergen " "Reactions     Penicillins Rash        No family history on file.     Social History     Socioeconomic History     Marital status:      Spouse name: Not on file     Number of children: Not on file     Years of education: Not on file     Highest education level: Not on file   Occupational History     Not on file   Tobacco Use     Smoking status: Never     Smokeless tobacco: Never   Substance and Sexual Activity     Alcohol use: Yes     Drug use: Never     Sexual activity: Not on file   Other Topics Concern     Not on file   Social History Narrative     Not on file     Social Determinants of Health     Financial Resource Strain: Not on file   Food Insecurity: Not on file   Transportation Needs: Not on file   Physical Activity: Not on file   Stress: Not on file   Social Connections: Not on file   Interpersonal Safety: Not on file   Housing Stability: Not on file        PHYSICAL EXAM:  /81   Pulse 111   Ht 1.702 m (5' 7\")   Wt 74.8 kg (165 lb)   SpO2 100%   BMI 25.84 kg/m               4/8/2024    10:00 AM   Tremor Motor Scale   Assessment Time 10:43   Medication None   DBS - Right Brain None   DBS - Left Brain None   Head 0   Face & Jaw 0   Voice 0   Outstretched - RIGHT 1.5   Outstretched - LEFT 2   Wingbeating - RIGHT 2   Wingbeating - LEFT 1.5   Kinetic - RIGHT 2   Kinetic - LEFT 2   Lower Limb - RIGHT 0   Lower Limb - LEFT 0   Lower Limb (Max) 0   Spiral - RIGHT 2.5   Spiral - LEFT 3   Handwriting 1   Dot approx - RIGHT 2.5   Dot approx - LEFT 2.5   Trunk (Standing) 0   Total Right 10.5   Total Left 11   Axial 0   TOTAL 22.5      Previously 25.5 on 12/18/2024     ASSESSMENT:  Ms. Zhong is a 64 year old left-handed female who presents for follow-up of essential tremor. 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 We re-discussed primidone, she would like to try it. She has " already tried propranolol without benefit (unclear dose) and is not a candidate for topiramate with history of nephrolithiasis. We again briefly discussed DBS today as a potential future option if unable to control tremor with medications.     PLAN:  - start primidone - see AVS.   - labs: LFT's    RTC 3-6 months, 30 min    Jessika Moon MD    [facility]  Department of Neurology       27 minutes spent on the date of the encounter doing chart review, history and exam, documentation and further activities as noted above      Again, thank you for allowing me to participate in the care of your patient.        Sincerely,        Jessika Moon MD

## 2024-04-08 NOTE — PATIENT INSTRUCTIONS
Let's check your liver function before we start the medication, primidone, but assuming this is fine, we will slowly increase as below. If you feel like your symptoms are controlled, you can stop increasing at the lowest effective dose. If you develop any side effects, decrease to the previous dose and please let me know.     Week 1: primidone 1/2 tab (25 mg) at bedtime   Week 2: primidone 1 tab (50 mg) at bedtime   Week 3: primidone 1 and 1/2 tab (75 mg) at bedtime  Week 4: primidone 2 tab (100 mg) at bedtime      After you have been on this dose for 1-2 weeks, please update me on how you are doing.

## 2024-04-08 NOTE — NURSING NOTE
"Aida Zhong is a 64 year old female who presents for:  Chief Complaint   Patient presents with    Tremors     Follow up - progressively declining, dropping more than normal. Left work 01/31 due to tremor.         Initial Vitals:  There were no vitals taken for this visit. Estimated body mass index is 27.07 kg/m  as calculated from the following:    Height as of 12/18/23: 1.676 m (5' 6\").    Weight as of 12/18/23: 76.1 kg (167 lb 11.2 oz).. There is no height or weight on file to calculate BSA. BP completed using cuff size: claire Walker CMA    "

## 2024-04-08 NOTE — PROGRESS NOTES
"Department of Neurology  Movement Disorders Division   Follow-up Note    Patient: Aida Zhong   MRN: [id number]   : 1960   Date of Visit: 2024    Ms. Zhong is a 64 year old left-handed female who presents for follow-up of essential tremor. She was last seen on 2024, at which time she was prescribed primidone. Today, she is unaccompanied.    INTERVAL EVENTS:  Since last visit, she reports that she's dropping things more and having more \"jerky\" movements  in the morning. 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 She has financial hardship from this. She has not started the primidone due to not being able to get the lab.     She has also been struggling with sciatica - has walker today for this due to pain with walking.     MEDICATIONS reviewed & pertinent for:  Fluoxetine   Primidone - not yet started.    Previous medications notable for:   Metoprolol & propranolol - side effects of fatigue, somnolence, worsening depression    ROS:  All others negative except as listed above.    Current Outpatient Medications   Medication Sig Dispense Refill    FLUoxetine (PROZAC) 20 MG capsule Take one capsule (20mg) daily, along with 40mg capsule daily, for total daily dose of 60mg.      FLUoxetine (PROZAC) 40 MG capsule Take one capsule (40mg) daily, along with 20mg capsule daily, for total daily dose of 60mg.      primidone (MYSOLINE) 50 MG tablet Week 1: 25 mg at bedtime; week 2: 50 mg at bedtime; week 3: 75 mg at bedtime; week 4: 100 mg at bedtime 30 tablet 11    rosuvastatin (CRESTOR) 5 MG tablet Take 1 tablet by mouth at bedtime         Allergies   Allergen Reactions    Penicillins Rash        No family history on file.     Social History     Socioeconomic History    Marital status:      Spouse name: Not on file    Number of children: Not on file    Years of education: Not on file    Highest education level: Not " "on file   Occupational History    Not on file   Tobacco Use    Smoking status: Never    Smokeless tobacco: Never   Substance and Sexual Activity    Alcohol use: Yes    Drug use: Never    Sexual activity: Not on file   Other Topics Concern    Not on file   Social History Narrative    Not on file     Social Determinants of Health     Financial Resource Strain: Not on file   Food Insecurity: Not on file   Transportation Needs: Not on file   Physical Activity: Not on file   Stress: Not on file   Social Connections: Not on file   Interpersonal Safety: Not on file   Housing Stability: Not on file        PHYSICAL EXAM:  /81   Pulse 111   Ht 1.702 m (5' 7\")   Wt 74.8 kg (165 lb)   SpO2 100%   BMI 25.84 kg/m               4/8/2024    10:00 AM   Tremor Motor Scale   Assessment Time 10:43   Medication None   DBS - Right Brain None   DBS - Left Brain None   Head 0   Face & Jaw 0   Voice 0   Outstretched - RIGHT 1.5   Outstretched - LEFT 2   Wingbeating - RIGHT 2   Wingbeating - LEFT 1.5   Kinetic - RIGHT 2   Kinetic - LEFT 2   Lower Limb - RIGHT 0   Lower Limb - LEFT 0   Lower Limb (Max) 0   Spiral - RIGHT 2.5   Spiral - LEFT 3   Handwriting 1   Dot approx - RIGHT 2.5   Dot approx - LEFT 2.5   Trunk (Standing) 0   Total Right 10.5   Total Left 11   Axial 0   TOTAL 22.5      Previously 25.5 on 12/18/2024     ASSESSMENT:  Ms. Zhong is a 64 year old left-handed female who presents for follow-up of essential tremor. 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 We re-discussed primidone, she would like to try it. She has already tried propranolol without benefit (unclear dose) and is not a candidate for topiramate with history of nephrolithiasis. We again briefly discussed DBS today as a potential future option if unable to control tremor with medications.     PLAN:  - start primidone - see AVS.   - " labs: LFT's    RTC 3-6 months, 30 min    Jessika Moon MD    [facility]  Department of Neurology       27 minutes spent on the date of the encounter doing chart review, history and exam, documentation and further activities as noted above

## 2024-05-24 ENCOUNTER — TELEPHONE (OUTPATIENT)
Dept: NEUROLOGY | Facility: CLINIC | Age: 64
End: 2024-05-24
Payer: COMMERCIAL

## 2024-05-24 DIAGNOSIS — G25.0 ESSENTIAL TREMOR: Primary | ICD-10-CM

## 2024-05-24 NOTE — LETTER
May 28, 2024    RE: Aida Walkerif  [address]   [facility] [address]    SHANIQUE Rasheed  [address]   [facility] [address]     Dear SHANIQUE Downey,    We have received your Neurology referral for Aida. Thank you for providing us with the opportunity to partner with you in the care of Aida. Aida was scheduled to see Dr. Jessika Moon on 4/8/24. Aida has decided to proceed with Deep Brain Stimulation work-up.    Once all testing is completed and the DBS Consensus group has met and discussed Aida, Miguelina Ellison, RN Deep Brain Stimulation , will send you the final decision. Please give me a call if you have any questions regarding the process.    Best Regards,  Juan Antonio Hobbs CMA  DBS Care Coordinator  [facility]    [facility]  [facility] NEUROLOGY  [address] [facility], [address] [address]  Clinic Phone: [phone]  Fax: [phone]

## 2024-05-25 ENCOUNTER — HEALTH MAINTENANCE LETTER (OUTPATIENT)
Age: 64
End: 2024-05-25

## 2024-05-28 NOTE — TELEPHONE ENCOUNTER
"Called the patient to get them scheduled for their DBS workup, for Essential Tremor.    Do you have any special scheduling requests? No per patient    The patient was asked the following questions regarding MRI scheduling:    Are you claustrophobic or will you be needing any type of sedation to complete the MRI? No per patient    Do you have any metals (list known metal, location, when it was planted and by whom)? No per patient    Do you have a power port/PICC line/Pace maker/other implanted medical devices. Include any type of artificial/man-made device (list device name/manufacture/model/serial #location/date of implant) (if patient has any life-sustaining implants, their MRI must be done at [facility] only. Check if the MRI order is correct)? No per patient    Do you have Diabetes? No per patient    Are you allergic to contrast or gadolinium? No per patient    Do you require assistance such as a wheel chair/dev lift? No per patient    What is your current weight and height? 165 lbs and 5'8\" per patient    The patient was informed of the following information on [employer] Care Companion:    [employer] Care Companion, is an interactive, personalized plan of care. Each individualized care plan within Care Companion can include education, recurring tasks, goal tracking, and one-time questionnaires. Care Companion can help you feel more confident in managing your health independently.    The patient was offered to signed up for [employer] Care Companion - work-up. The patient stated she is interested in signing up for [employer] Care Compan[facility]. A future message was sent to sign the patient up closer to their work-up dates. The order was placed.    A list of the patient's Deep Brain Stimulation work up appointments was sent to them via [employer].    The patient was scheduled for their Rating scales, Neuropsychological Evaluation, Neurosurgery consultation, 3T MRI Brain with/without contrast.      "

## 2024-05-30 NOTE — PROGRESS NOTES
MOVEMENT DISORDERS CLINIC    PATIENT: Aida Zhong    : 1960    PATT: May 31, 2024    REASON FOR VISIT:  Pre-DBS ON/OFF motor symptom evaluation.      HPI: Ms. Zhong is a 64 year old L-handed female with a history of essential tremor who presents for motor testing as part of DBS evaluation.     She was diagnosed with essential tremor disease ~. Her symptoms started at age 13 with action tremor in her left hand primarily, over time progressed to involve both hands and in fact now feels her R hand is worse. She endorses internal tremors of her legs in the past few months.      Tremors interfere with eating, drinking, writing, keyboard use. She stopped working due to interference with computer use. She is embarrassed by the tremors and has socialized less.     She has sciatica on LLE, otherwise no difficulty walking. Feels her balance is not as good as it used to be. Fell ~8mo, missed a step going down stairs.     Endorses some short-term memory changes, she has a hard time remembering small details and can be frustrated by this. 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     Has a sister, brother, mother, maternal grandfather, and two daughters with tremor.     Goal for DBS:  reduce tremor, improve computer use, wants to work at least some sort of job, wants to be able to hold grandchildren more comfortably, reduce social anxiety.    Patient would like L-side of her body treated first.     She is leaning towards rechargeable IPG    Current antiparkinsonian medication:     PD Medications     Primidone  OFF           Previous medications tried:  Metoprolol & propranolol - side effects of fatigue, somnolence, worsening depression  Primidone - significant brain fog and imbalance  Topiramate - never tried, but relative contraindication with hx of kidney stones        2024    10:09 AM   Tremor ADL Scale   1. Speaking 0   2. Feeding (spoon) 3   3. Drinking (glass)  3   4. Hygiene 2   5. Dressing 1   6. Pouring 3   7. Carry plate, tray 4   8. Use keys 2   9. Writing 2   10. Work or housework 3   11a. Overall disability 4   11b. Most affected task Typing,    12. Social impact 4   ADL TOTAL 31           Physical Exam:    Vital Signs:  Blood pressure 127/81, pulse 102, weight 76.7 kg (169 lb 3.2 oz), SpO2 96%., Body mass index is 26.5 kg/m .       MOTOR TEST:   UPDRS Flowsheet completed below. Exam was videotaped.   Videotaping consent obtained prior to first exam.        5/31/2024     8:00 AM   Tremor Motor Scale   Assessment Time 08:10   Medication None   DBS - Right Brain None   DBS - Left Brain None   Head 0   Face & Jaw 0   Voice 0.5   Outstretched - RIGHT 2   Outstretched - LEFT 2   Wingbeating - RIGHT 2   Wingbeating - LEFT 1.5   Kinetic - RIGHT 2   Kinetic - LEFT 2   Lower Limb - RIGHT 1   Lower Limb - LEFT 2.5   Lower Limb (Max) 2.5   Spiral - RIGHT 2   Spiral - LEFT 2   Handwriting 1   Dot approx - RIGHT 2.5   Dot approx - LEFT 2   Trunk (Standing) 0   Total Right 11.5   Total Left 12   Axial 0.5   TOTAL 24           ASSESSMENT/PLAN:    Essential Tremor: Ms. Zhong is a 64 year old L-handed female with a history of essential tremor who presents for motor testing as part of DBS evaluation.     Tremor was more prominent in kinetic portion, relatively more distal than proximal but still with proximal component. We discussed that kinetic tremors tend to be more difficult to treat than postural ones.     The leg tremor was distractible.     __  Pt had some knowledge about DBS.   I briefly went over DBS risks, & benefits; the possibility of 1 - 3 % serious side effects including infection, bleeding, stroke, cognitive & speech impairment, seizures, & death; the possibility of lead misplacement; appropriate DBS candidates; and DBS programming & the need to return to clinic for reprogramming.  All questions were answered.    __ She was informed that we'll contact her  after we discuss her work-up at the DBS Consensus Meeting.  She understands the plan.      Damian Vitale MD  Fellow  Movement Disorders Division  [facility] Department of Neurology    Patient and plan was examined & discussed with attending physician, Dr. Kota Hills     Patient seen and UE tremor examined with Dr. Dutton and I agree with the assessment and plan as outlined.  Time this date with patient, reviewing records, & documenting, fellow & attending, 1h.  The longitudinal plan of care for essential tremor was addressed during this visit. Due to the added complexity in care, we will continue to provide support in the subsequent management of this condition and with the ongoing continuity of care of this condition.    Kota Hills PhD, MD

## 2024-05-31 ENCOUNTER — OFFICE VISIT (OUTPATIENT)
Dept: NEUROLOGY | Facility: CLINIC | Age: 64
End: 2024-05-31
Payer: COMMERCIAL

## 2024-05-31 VITALS
OXYGEN SATURATION: 96 % | DIASTOLIC BLOOD PRESSURE: 81 MMHG | SYSTOLIC BLOOD PRESSURE: 127 MMHG | HEART RATE: 102 BPM | WEIGHT: 169.2 LBS | BODY MASS INDEX: 26.5 KG/M2

## 2024-05-31 DIAGNOSIS — G25.0 ESSENTIAL TREMOR: Primary | ICD-10-CM

## 2024-05-31 PROCEDURE — 99215 OFFICE O/P EST HI 40 MIN: CPT | Performed by: INTERNAL MEDICINE

## 2024-05-31 PROCEDURE — G2211 COMPLEX E/M VISIT ADD ON: HCPCS | Performed by: INTERNAL MEDICINE

## 2024-05-31 RX ORDER — GABAPENTIN 600 MG/1
200 TABLET ORAL
COMMUNITY
End: 2024-07-08

## 2024-05-31 ASSESSMENT — PAIN SCALES - GENERAL: PAINLEVEL: MODERATE PAIN (4)

## 2024-05-31 ASSESSMENT — PATIENT HEALTH QUESTIONNAIRE - PHQ9: SUM OF ALL RESPONSES TO PHQ QUESTIONS 1-9: 17

## 2024-05-31 NOTE — PATIENT INSTRUCTIONS
__  You have completed the video Motor Assessment.    __  Continue with your DBS workup appointments.  __  Once you're done with your workup, we'll discuss you at the DBS Consensus meeting & will let you know the decision.  __  You may contact us with any question.

## 2024-05-31 NOTE — NURSING NOTE
Return motor testing  Timo Holcomb CMA    Depression Response    Patient completed the PHQ-9 assessment for depression and scored >9? Yes  Question 9 on the PHQ-9 was positive for suicidality? No  Does patient have current mental health provider? No    Is this a virtual visit? No    I personally notified the following: clinic nurse

## 2024-05-31 NOTE — NURSING NOTE
Depression Screening Follow-up        5/31/2024     7:49 AM   PHQ   PHQ-9 Total Score 17   Q9: Thoughts of better off dead/self-harm past 2 weeks Not at all         5/31/2024     7:49 AM   Last PHQ-9   1.  Little interest or pleasure in doing things 2   2.  Feeling down, depressed, or hopeless 1   3.  Trouble falling or staying asleep, or sleeping too much 3   4.  Feeling tired or having little energy 3   5.  Poor appetite or overeating 3   6.  Feeling bad about yourself 3   7.  Trouble concentrating 1   8.  Moving slowly or restless 1   Q9: Thoughts of better off dead/self-harm past 2 weeks 0   PHQ-9 Total Score 17   Difficulty at work, home, or with people Very difficult         Does the patient currently have a mental health provider?  No  Follow Up Actions Taken  Patient to follow up with PCP. Clinic staff to schedule appointment if able.  Patient declined referral.    Patient reports that this is a longstanding issue where she feels that she has enough resources herself to assist in dealing with her depression. Declined referral.      Kalina Mccarthy RN

## 2024-06-04 NOTE — TELEPHONE ENCOUNTER
RECORDS RECEIVED FROM: Care Everywhere   REASON FOR VISIT: DBS consult for ET   PROVIDER: Hermann Schumacher MD   DATE OF APPT: 7/22/24 @ 1:00 pm    NOTES (FOR ALL VISITS) STATUS DETAILS   OFFICE NOTE from referring provider Internal 5/22/24 (MyChart), 4/8/24, 12/18/23 Jessika Moon MD @[facility] Neuro     OFFICE NOTE from other specialist Care Everywhere [facility]31[facility]24 [facility]    11/21/23, 10/10/23 Nasreen Carrillo PA  @[facility]    1/31/23 Tabitha Haq MD  @[facility]      MEDICATION LIST Internal    IMAGING  (FOR ALL VISITS)     MRI (HEAD, NECK, SPINE) PACS [facility]  6/11/24 MR Brain

## 2024-06-11 ENCOUNTER — ANCILLARY PROCEDURE (OUTPATIENT)
Dept: MRI IMAGING | Facility: CLINIC | Age: 64
End: 2024-06-11
Attending: PSYCHIATRY & NEUROLOGY
Payer: COMMERCIAL

## 2024-06-11 DIAGNOSIS — G25.0 ESSENTIAL TREMOR: ICD-10-CM

## 2024-06-11 RX ORDER — GADOBUTROL 604.72 MG/ML
0.1 INJECTION INTRAVENOUS ONCE
Status: COMPLETED | OUTPATIENT
Start: 2024-06-11 | End: 2024-06-11

## 2024-06-11 RX ADMIN — GADOBUTROL 7.5 ML: 604.72 INJECTION INTRAVENOUS at 11:52

## 2024-06-26 ASSESSMENT — SLEEP AND FATIGUE QUESTIONNAIRES
HOW LIKELY ARE YOU TO NOD OFF OR FALL ASLEEP WHILE WATCHING TV: HIGH CHANCE OF DOZING
HOW LIKELY ARE YOU TO NOD OFF OR FALL ASLEEP WHILE SITTING AND READING: HIGH CHANCE OF DOZING
HOW LIKELY ARE YOU TO NOD OFF OR FALL ASLEEP WHILE LYING DOWN TO REST IN THE AFTERNOON WHEN CIRCUMSTANCES PERMIT: HIGH CHANCE OF DOZING
HOW LIKELY ARE YOU TO NOD OFF OR FALL ASLEEP WHILE SITTING AND TALKING TO SOMEONE: WOULD NEVER DOZE
HOW LIKELY ARE YOU TO NOD OFF OR FALL ASLEEP WHILE SITTING QUIETLY AFTER LUNCH WITHOUT ALCOHOL: MODERATE CHANCE OF DOZING
HOW LIKELY ARE YOU TO NOD OFF OR FALL ASLEEP WHILE SITTING INACTIVE IN A PUBLIC PLACE: SLIGHT CHANCE OF DOZING
HOW LIKELY ARE YOU TO NOD OFF OR FALL ASLEEP WHEN YOU ARE A PASSENGER IN A CAR FOR AN HOUR WITHOUT A BREAK: SLIGHT CHANCE OF DOZING
HOW LIKELY ARE YOU TO NOD OFF OR FALL ASLEEP IN A CAR, WHILE STOPPED FOR A FEW MINUTES IN TRAFFIC: SLIGHT CHANCE OF DOZING

## 2024-06-28 ENCOUNTER — VIRTUAL VISIT (OUTPATIENT)
Dept: NEUROPSYCHOLOGY | Facility: CLINIC | Age: 64
End: 2024-06-28
Payer: COMMERCIAL

## 2024-06-28 DIAGNOSIS — F09 MENTAL DISORDER DUE TO GENERAL MEDICAL CONDITION: ICD-10-CM

## 2024-06-28 DIAGNOSIS — G25.0 ESSENTIAL TREMOR: Primary | ICD-10-CM

## 2024-06-28 PROCEDURE — 99207 PR NO CHARGE LOS: CPT | Mod: 95

## 2024-06-28 NOTE — PROGRESS NOTES
The interview portion of the neuropsychological evaluation was completed remotely via video with Dr. Dubois and Dr. Yousif. The testing portion of the evaluation is scheduled in the clinic on 7/3/2024. Full report to follow, pending completion of the evaluation.

## 2024-07-03 ENCOUNTER — OFFICE VISIT (OUTPATIENT)
Dept: NEUROPSYCHOLOGY | Facility: CLINIC | Age: 64
End: 2024-07-03
Attending: PSYCHIATRY & NEUROLOGY
Payer: COMMERCIAL

## 2024-07-03 DIAGNOSIS — G25.0 ESSENTIAL TREMOR: ICD-10-CM

## 2024-07-03 DIAGNOSIS — F09 MENTAL DISORDER DUE TO GENERAL MEDICAL CONDITION: Primary | ICD-10-CM

## 2024-07-03 PROCEDURE — 96138 PSYCL/NRPSYC TECH 1ST: CPT

## 2024-07-03 PROCEDURE — 96132 NRPSYC TST EVAL PHYS/QHP 1ST: CPT

## 2024-07-03 PROCEDURE — 96139 PSYCL/NRPSYC TST TECH EA: CPT

## 2024-07-03 PROCEDURE — 96133 NRPSYC TST EVAL PHYS/QHP EA: CPT

## 2024-07-03 PROCEDURE — 90791 PSYCH DIAGNOSTIC EVALUATION: CPT

## 2024-07-03 NOTE — PROGRESS NOTES
Pt was seen for neuropsychological evaluation at the request of Dr. Jessika Moon for the purposes of diagnostic clarification and treatment planning. 218 minutes of test administration and scoring were provided by this writer. Please see Dr. Guerda Dubois's report for a full interpretation of the findings.    Harjinder Angel MA, CSP
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
## 2024-07-08 ENCOUNTER — OFFICE VISIT (OUTPATIENT)
Dept: NEUROLOGY | Facility: CLINIC | Age: 64
End: 2024-07-08
Payer: COMMERCIAL

## 2024-07-08 VITALS — SYSTOLIC BLOOD PRESSURE: 129 MMHG | DIASTOLIC BLOOD PRESSURE: 80 MMHG | OXYGEN SATURATION: 95 % | HEART RATE: 79 BPM

## 2024-07-08 DIAGNOSIS — G25.0 ESSENTIAL TREMOR: Primary | ICD-10-CM

## 2024-07-08 PROCEDURE — 99214 OFFICE O/P EST MOD 30 MIN: CPT | Mod: GC | Performed by: PSYCHIATRY & NEUROLOGY

## 2024-07-08 NOTE — NURSING NOTE
"Aida Zhong is a 64 year old female who presents for:  Chief Complaint   Patient presents with    Tremors     4 month follow up        Initial Vitals:  /80 (BP Location: Right arm, Patient Position: Supine, Cuff Size: Adult Regular)   Pulse 79   SpO2 95%  Estimated body mass index is 26.5 kg/m  as calculated from the following:    Height as of 4/8/24: 1.702 m (5' 7\").    Weight as of 5/31/24: 76.7 kg (169 lb 3.2 oz).. There is no height or weight on file to calculate BSA. BP completed using cuff size: regular    Ashia Cope MA  "

## 2024-07-08 NOTE — PROGRESS NOTES
"Department of Neurology  Movement Disorders Division  Follow-up Patient Visit    Patient: Aida Zhong  MRN: [id number]  : 1960  Date of Visit: 2024  PCP & Referring Provider: Nasreen Carrillo    CC: Essential Tremor    HPI:  Aida Zhong is a 64yr old Left-handed female who presents for follow-up of essential tremor. Her symptoms started when she was age 13 with an action tremor in her L hand that has since progressed to being in the bilateral hands and has worsened in severity to the point that it interferes with eating/drinking, socialization, and it forced her to retire (2024) as she could not keep up with fine motor skills needed for her job. She also has internal tremors in her BLE. She was diagnosed with ET ~. Endorses a sister, brother, mother, and maternal grandfather with tremor    INTERVAL EVENTS:  The patient was last seen on 2024. Today she presents unaccompanied    At last visit, the patient was reporting increased \"jerky\" movements and that she was dropping things more. Recommended to start Primidone 100mg at bedtime. DBS was also discussed should medications prove unsuccessful. Unfortunately, the patient also suffered side-effects from Primidone - lethargy, brain fog, imbalance - and didn't notice much of a difference in her tremor; and so elected to pursue DBS. See 2024 note from Dr Hills for motor testing and 7/3/2024 note from Dr Dubois for neuropsych evaluation. She has an appointment to be seen by NSG on 2024 and afterwards will be discussed at the DBS Consensus Meeting    Today, the patient reports that her symptoms are again a bit worse than prior, specifically that her \"jerky\" movements are becoming more intense. She tried cooking pasta the other day and spilled some while trying to poor it into colander. She continues to drop things, but not more than normal. She will also occasionally get irregular \"popcorn\" twitches throughout her torso/BLE. These " occur mostly at rest, but does not limit her ability to ambulate    Of note, the patient also struggles with sciatica and will use a walker to help with pain from ambulation. She was also prescribed Gabapentin 200mg at bedtime for this by her PCP for a time, but is now off of it as her sciatic flare resolved. She started the Gabapentin 1-2mo after her trial of Primidone    MEDICATIONS:  Pertinent Movement Medications     Qhs    Fluoxetine (20mg)   1    Fluoxetine  (40mg)   1    Gabapentin (200mg)      off      Prior Medications:  - Metoprolol: fatigue/somnolence, worsening depression  - Propranolol: fatigue/somnolence, worsening depression  - Primidone: lethargy, brain fog, imbalance  - Topiramate: never taken, but has hx nephrolithiasis    Current Outpatient Medications   Medication Sig Dispense Refill    FLUoxetine (PROZAC) 20 MG capsule Take one capsule (20mg) daily, along with 40mg capsule daily, for total daily dose of 60mg.      FLUoxetine (PROZAC) 40 MG capsule Take one capsule (40mg) daily, along with 20mg capsule daily, for total daily dose of 60mg.      rosuvastatin (CRESTOR) 5 MG tablet Take 1 tablet by mouth at bedtime          Allergies   Allergen Reactions    Penicillins Rash     Family History   Problem Relation Age of Onset    Tremor Mother     Tremor Sister     Tremor Maternal Grandfather     Tremor Daughter     Tremor Daughter      Social History     Tobacco Use    Smoking status: Never    Smokeless tobacco: Never   Vaping Use    Vaping status: Never Used   Substance Use Topics    Alcohol use: Yes    Drug use: Never       PHYSICAL EXAM:  /80 (BP Location: Right arm, Patient Position: Supine, Cuff Size: Adult Regular)   Pulse 79   SpO2 95%      5/31/2024  8:00 AM 7/8/2024  10:00 AM   Tremor Motor Scale     Assessment Time 08:10  10:08    Medication None  Off    DBS - Right Brain None  None    DBS - Left Brain None  None    Head 0  0    Face & Jaw 0  0    Voice 0.5  0    Outstretched - RIGHT  2  1.5    Outstretched - LEFT 2  2    Wingbeating - RIGHT 2  1.5    Wingbeating - LEFT 1.5  1.5    Kinetic - RIGHT 2  1.5    Kinetic - LEFT 2  2    Lower Limb - RIGHT 1  1    Lower Limb - LEFT 2.5  1    Lower Limb (Max) 2.5  1    Spiral - RIGHT 2  2    Spiral - LEFT 2  1.5    Handwriting 1  1.5    Dot approx - RIGHT 2.5  2    Dot approx - LEFT 2  2    Trunk (Standing) 0  0    Total Right 11.5  9.5    Total Left 12  10    Axial 0.5  0    TOTAL 24  20      DATA:  - LFTs (4/26/2024): Alk Phos 136, AST 38, ALT 36, Tbili 0.3    ASSESSMENT:  Aida Zhong is a 64yr old left-handed female who presents for follow-up of Essential Tremor. The tremor is quite disabling to the point that she had to leave her job due to being unable to keep up with required fine motor tasks and even when not working the tremor causes difficulty with eating/drinking and cooking. It also keeps her from socializing as she finds the tremor embarrassing. She is currently undergoing work-up for DBS as she has attempted a number of medications (ie Metoprolol, Propranolol, Primidone; not a candidate for Topiramate given hx kidney stones) but been unable to tolerate due to side-effects/had poor benefit. While her tremor is worse on the R, she would want to get her L-side body completed first given this is her dominant hand. Ultimately she is interested in bilateral DBS with rechargeable battery. Education resources for DBS were provided    We also discussed a trial of Gabapentin before completing DBS, but she is not interested in this for now    PLAN:  - Keep appointment with NS for DBS work-up on 7/22/2024  - After NSG appointment is completed, we will discuss your case at the DBS Consensus Meeting and determine DBS eligibility and timing from there  - Follow-up in clinic to be determined after presumed DBS placement    This patient was seen with Movement Disorder Specialist, Dr Moon, who agrees with the above plan    Oneyda Boo MD  Movement  Disorder Fellow

## 2024-07-08 NOTE — PATIENT INSTRUCTIONS
- Keep appointment with Neurosurgery for DBS work-up on 7/22/2024  - After NSG appointment is completed, we will discuss your case at the DBS Consensus Meeting and determine DBS eligibility and timing from there  - Follow-up in clinic to be determined after presumed DBS placement. If DBS is not placed, call to make a follow-up appointment

## 2024-07-08 NOTE — LETTER
"2024      Aida Zhong  [address]  [facility] [address]      Dear Colleague,    Thank you for referring your patient, Aida Zhong, to the [facility]. Please see a copy of my visit note below.    Department of Neurology  Movement Disorders Division  Follow-up Patient Visit    Patient: Aida Zhong  MRN: [id number]  : 1960  Date of Visit: 2024  PCP & Referring Provider: Nasreen Carrillo    CC: Essential Tremor    HPI:  Aida Zhong is a 64yr old Left-handed female who presents for follow-up of essential tremor. Her symptoms started when she was age 13 with an action tremor in her L hand that has since progressed to being in the bilateral hands and has worsened in severity to the point that it interferes with eating/drinking, socialization, and it forced her to retire (2024) as she could not keep up with fine motor skills needed for her job. She also has internal tremors in her BLE. She was diagnosed with ET ~. Endorses a sister, brother, mother, and maternal grandfather with tremor    INTERVAL EVENTS:  The patient was last seen on 2024. Today she presents unaccompanied    At last visit, the patient was reporting increased \"jerky\" movements and that she was dropping things more. Recommended to start Primidone 100mg at bedtime. DBS was also discussed should medications prove unsuccessful. Unfortunately, the patient also suffered side-effects from Primidone - lethargy, brain fog, imbalance - and didn't notice much of a difference in her tremor; and so elected to pursue DBS. See 2024 note from Dr Hills for motor testing and 7/3/2024 note from Dr Dubois for neuropsych evaluation. She has an appointment to be seen by NSNAOMI on 2024 and afterwards will be discussed at the DBS Consensus Meeting    Today, the patient reports that her symptoms are again a bit worse than prior, specifically that her \"jerky\" movements are becoming more " "intense. She tried cooking pasta the other day and spilled some while trying to poor it into colander. She continues to drop things, but not more than normal. She will also occasionally get irregular \"popcorn\" twitches throughout her torso/BLE. These occur mostly at rest, but does not limit her ability to ambulate    Of note, the patient also struggles with sciatica and will use a walker to help with pain from ambulation. She was also prescribed Gabapentin 200mg at bedtime for this by her PCP for a time, but is now off of it as her sciatic flare resolved. She started the Gabapentin 1-2mo after her trial of Primidone    MEDICATIONS:  Pertinent Movement Medications     Qhs    Fluoxetine (20mg)   1    Fluoxetine  (40mg)   1    Gabapentin (200mg)      off      Prior Medications:  - Metoprolol: fatigue/somnolence, worsening depression  - Propranolol: fatigue/somnolence, worsening depression  - Primidone: lethargy, brain fog, imbalance  - Topiramate: never taken, but has hx nephrolithiasis    Current Outpatient Medications   Medication Sig Dispense Refill     FLUoxetine (PROZAC) 20 MG capsule Take one capsule (20mg) daily, along with 40mg capsule daily, for total daily dose of 60mg.       FLUoxetine (PROZAC) 40 MG capsule Take one capsule (40mg) daily, along with 20mg capsule daily, for total daily dose of 60mg.       rosuvastatin (CRESTOR) 5 MG tablet Take 1 tablet by mouth at bedtime          Allergies   Allergen Reactions     Penicillins Rash     Family History   Problem Relation Age of Onset     Tremor Mother      Tremor Sister      Tremor Maternal Grandfather      Tremor Daughter      Tremor Daughter      Social History     Tobacco Use     Smoking status: Never     Smokeless tobacco: Never   Vaping Use     Vaping status: Never Used   Substance Use Topics     Alcohol use: Yes     Drug use: Never       PHYSICAL EXAM:  /80 (BP Location: Right arm, Patient Position: Supine, Cuff Size: Adult Regular)   Pulse 79   " SpO2 95%      5/31/2024  8:00 AM 7/8/2024  10:00 AM   Tremor Motor Scale     Assessment Time 08:10  10:08    Medication None  Off    DBS - Right Brain None  None    DBS - Left Brain None  None    Head 0  0    Face & Jaw 0  0    Voice 0.5  0    Outstretched - RIGHT 2  1.5    Outstretched - LEFT 2  2    Wingbeating - RIGHT 2  1.5    Wingbeating - LEFT 1.5  1.5    Kinetic - RIGHT 2  1.5    Kinetic - LEFT 2  2    Lower Limb - RIGHT 1  1    Lower Limb - LEFT 2.5  1    Lower Limb (Max) 2.5  1    Spiral - RIGHT 2  2    Spiral - LEFT 2  1.5    Handwriting 1  1.5    Dot approx - RIGHT 2.5  2    Dot approx - LEFT 2  2    Trunk (Standing) 0  0    Total Right 11.5  9.5    Total Left 12  10    Axial 0.5  0    TOTAL 24  20      DATA:  - LFTs (4/26/2024): Alk Phos 136, AST 38, ALT 36, Tbili 0.3    ASSESSMENT:  Aida Zhong is a 64yr old left-handed female who presents for follow-up of Essential Tremor. The tremor is quite disabling to the point that she had to leave her job due to being unable to keep up with required fine motor tasks and even when not working the tremor causes difficulty with eating/drinking and cooking. It also keeps her from socializing as she finds the tremor embarrassing. She is currently undergoing work-up for DBS as she has attempted a number of medications (ie Metoprolol, Propranolol, Primidone; not a candidate for Topiramate given hx kidney stones) but been unable to tolerate due to side-effects/had poor benefit. While her tremor is worse on the R, she would want to get her L-side body completed first given this is her dominant hand. Ultimately she is interested in bilateral DBS with rechargeable battery. Education resources for DBS were provided    We also discussed a trial of Gabapentin before completing DBS, but she is not interested in this for now    PLAN:  - Keep appointment with NSG for DBS work-up on 7/22/2024  - After NSG appointment is completed, we will discuss your case at the DBS Consensus  Meeting and determine DBS eligibility and timing from there  - Follow-up in clinic to be determined after presumed DBS placement    This patient was seen with Movement Disorder Specialist, Dr Moon, who agrees with the above plan    Oneyda Boo MD  Movement Disorder Fellow      Attestation signed by Jessika Moon MD at 7/8/2024 10:56 AM:  Neurology Attending Attestation:   I personally saw this patient with our neurology fellow and agree with the fellow's findings and plan of care as documented in the fellow's note. I personally performed salient aspects of the history and neurological examination.     I personally reviewed the vital signs, medications, and labs. I personally viewed the imaging.    MRI brain with and without cont 6/11/2024 - personally reviewed & notable for mild white matter disease consistent with age.  Impression:  1.  No acute intracranial pathology or abnormal intracranial  enhancement.  2.  Mild leukoaraiosis.       Jessika Moon MD    [facility]  Department of Neurology     30 minutes spent on the date of the encounter doing chart review, history and exam, documentation and further activities as noted above      Again, thank you for allowing me to participate in the care of your patient.        Sincerely,        Jessika Moon MD

## 2024-07-10 NOTE — CONFIDENTIAL NOTE
Patient Aida Zhong   PEREZ [birth year]    MRN [id number]  Provider      60   Tech SH    Sex Female   Occupation     Education 14   Language     Preferred Hand Left   Station OP    Age 64                 DEMENTIA RATING SCALE - 2   TEST FORM Standard     Raw MAS       Attention 36 11       Init/Psv 36 10       Construct 6 10       Concept 39 12       Memory 24 10       Total / 144 141 12                WAIS-IV          Raw ScS       Comprehension 26 11       Letter Num Seq 24 16       Digit Span 31 12       Matrix Reasoning 20 13                WIDE RANGE ACHIEVEMENT TEST    TEST FORM 5     Raw SS %ile Grade Eq.     Reading 67 115 84 >12.9              WECHSLER MEMORY SCALE - IV         Raw ScS/%ile       Info/Orientation 14        Logical Memory I 28 11       Logical Memory II 27 12       LM Recognition  26 >75                BOSTON NAMING TEST         Score (Correct+Stim Cue)  57 MAS 12     # Correct Stimulus Cue  0 T 57     # Correct Phonemic Cue  3                D-KEFS VERBAL FLUENCY    TEST FORM ALTERNATE    Raw ScS       Letter Fluency 36 10       Category Fluency 38 11       Switching Fluency             Total Correct 14 12       Switching Accuracy 14 12                CLOCK DRAWING         Command 3 /3 Rating: NORMAL     Copy 3 /3 Rating: NORMAL              TRAIL MAKING TEST          Seconds Errors MAS z     Trails A 21 0 15 1.24     Trails B 67 0 11 0.37              STROOP          Raw T MAS      Word 80 39 7      Color  61 48 8      Color/Word 32 48 9      Interference -1 49                [place] CARD SORTING TEST - 1 deck       # Categories 5 %ile >16      # Persev Error 3 T 76      Concept. Lev Resp. 56 T 68      FMS 0                 GREY JUDGMENT OF LINE ORIENTATION  TEST FORM H    Raw Score 26 Raw Correction 30     MAS 13 Grey %ile 86              HVLT-R    TEST FORM 5     Raw T       Trial 1 8        Trial 2 10        Trial 3 11        Total 1-3 29 55       Learning 3        Delay 11 58    "    Percent Retained 100% 57       True Positives 12        Discrimination Index 12 56       False Positives 0                 BRIEF VISUAL MEMORY TEST - REVISED  TEST FORM 4     Raw T       Trial 1 4        Trial 2 9        Trial 3 11        Total 1-3 24 54       Learning 7 68       Delay 12 67       Percent Retained 109% >16 %ile      Recognition Hits 6        Discrimination Index 6 >16 %ile      False Alarms 0                 CONOR-O COMPLEX FIGURE TEST          Raw %ile       Copy  29 2-5       Time to Copy 325\" >16                ANNETTE COGNITVE ASSESSMENT (MOCA)  TEST FORM 7.3    Score 30 /30                BDI         Score 49        Interpretation SEVERE                 APATHY SCALE         Score 27                 QUESTIONNAIRES         ESS         RDBSQ         QUEST                  MMPI-3           "
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
## 2024-07-12 ENCOUNTER — TELEPHONE (OUTPATIENT)
Dept: NEUROLOGY | Facility: CLINIC | Age: 64
End: 2024-07-12

## 2024-07-12 NOTE — TELEPHONE ENCOUNTER
"                                                      Deep Brain Stimulation Surgery Surgical Candidacy Form 2024    Patient name:  Aida Zhong  YOB: 1960               MRN: [id number]  City/State:  [place], [place]                               Number of miles from [place]: 19  Height:   Ht Readings from Last 1 Encounters:   07/22/24 1.702 m (5' 7\")   Weight:   Wt Readings from Last 1 Encounters:   07/22/24 76.2 kg (168 lb)   BMI:   BMI Readings from Last 1 Encounters:   07/22/24 26.31 kg/m    Blood Pressure:   BP Readings from Last 3 Encounters:   07/08/24 129/80   05/31/24 127/81   04/08/24 123/81   A1C:  No results found for: \"A1C\"            Diagnosis: Essential Tremor   Year of Onset of Symptoms: ~1973  Year of Diagnosis: 2014  Handedness: Left  Side of Onset: Left upper extremity.  Disease Features:  Tremor    Symptoms started with left hand action tremor at age 13 and then has spread to involve bilateral hands R>L and her bilateral lower extremities. The patient was had worsening depression, fatigue and somnolence on beta blockers (both propranolol and metoprolol) and trialed primidone with significant imbalance and brain fog at low doses of primidone. Topiramate is contraindicated due to history of nephrolithiasis and declined a gabapentin trial.   Surgery Goals: reduce tremor, improve computer use, wants to work at least some sort of job, wants to be able to hold grandchildren more comfortably, reduce social anxiety. Would like to start on the left but would ultimately want bilateral DBS with a rechargeable battery.   Current medications as of  7/23/24    Current Outpatient Medications   Medication Sig Dispense Refill    FLUoxetine (PROZAC) 20 MG capsule Take one capsule (20mg) daily, along with 40mg capsule daily, for total daily dose of 60mg.      FLUoxetine (PROZAC) 40 MG capsule Take one capsule (40mg) daily, along with 20mg capsule daily, for total daily dose of 60mg.      " rosuvastatin (CRESTOR) 5 MG tablet Take 1 tablet by mouth at bedtime         Movement Disorder Medications (in table format):   QHS   Fluoxetine 20 mg 1   Fluoxetine 60 mg 1     Prior Medications:  - Metoprolol: fatigue/somnolence, worsening depression  - Propranolol: fatigue/somnolence, worsening depression  - Primidone: lethargy, brain fog, imbalance  - Topiramate: never taken, but has hx nephrolithiasis     PMH:   Past Medical History:   Diagnosis Date    Depression     Essential tremor     HLD (hyperlipidemia)        PSH:   Past Surgical History:   Procedure Laterality Date    BROW LIFT      DILATION AND CURETTAGE      HC ARTHROSCOPIC REMOVAL GANGLION CYST-WRIST/HAND Right     HC ENLARGE BREAST WITH IMPLANT      HC REMOVAL OF BREAST IMPLANT      TONSILLECTOMY         Soc Hx: No history of tobacco, cannabis or illicit substance use. Infrequently drinks a glass of wine and denies a history of heavy alcohol use or chemical dependency treatment at any point.    Family Hx of Movement Disorders: Family history of tremor in her mother, maternal grandfather, sister, brother and two daughters. No known history of Parkinson's Disease or dementia in her family.     Social Work Considerations:  Resigned from her job in 01/2024 due to difficultly working from symptoms and hopes to return to work. Lives alone in [place] but has support from 4 children. Plans to stay with son and daughter and law post-op     Motor Assessment on 5/31/24 with Laura Hills and [place] :    Tetras:        ADL      31/48       Motor: 24/64 5/29/2024  10:09 AM   Tremor ADL Scale    1. Speaking (0) Normal    2. Feeding (spoon) (3) Moderately abnormal. Spills a lot or changes strategy to complete task such as using two hands or leaning over.    3. Drinking (glass) (3) Moderately abnormal. Spills a lot or changes strategy to complete task such as using two hands or leaning over.    4. Hygiene (2) Mildly abnormal. Some difficulty but can complete  task.    5. Dressing (1) Slightly abnormal. Tremor is present but does not interfere with dressing.    6. Pouring (3) Moderately abnormal. Must use two hands or uses other strategies to avoid spilling.    7. Carry plate, tray (4) Severely abnormal. Cannot carry food trays or similar items.    8. Use keys (2) Mildly abnormal. Commonly misses target but still routinely puts key in lock with one hand.    9. Writing (2) Mildly abnormal. Difficulty writing due to the tremor.    10. Work or housework (3) Moderately abnormal. Unable to continue working without using strategies such as changing jobs or using special equipment.    11a. Overall disability (4) Severely abnormal. Cannot do the task.    11b. Most affected task Typing,     12. Social impact (4) Avoids participating in most social situations or professional meetings because of tremor.    ADL TOTAL 31       5/31/2024  8:00 AM   Tremor Motor Scale    Assessment Time 08:10    Medication None    DBS - Right Brain None    DBS - Left Brain None    Head 0    Face & Jaw 0    Voice 0.5    Outstretched - RIGHT 2    Outstretched - LEFT 2    Wingbeating - RIGHT 2    Wingbeating - LEFT 1.5    Kinetic - RIGHT 2    Kinetic - LEFT 2    Lower Limb - RIGHT 1    Lower Limb - LEFT 2.5    Lower Limb (Max) 2.5    Spiral - RIGHT 2    Spiral - LEFT 2    Handwriting 1    Dot approx - RIGHT 2.5    Dot approx - LEFT 2    Trunk (Standing) 0    Total Right 11.5    Total Left 12    Axial 0.5    TOTAL 24       Neurosurgeon:   Dr. Schumacher    Antiplatelets/Anticoagulants: No  Need for cardiology clearance: No  Additional Imaging needs (in addition to the DBS MRI): No  FSC Contraindications None   Other implants: None  Device discussion: Rechargable     Neuropsychological Evaluation on 7/3/2024 with Laura Dubois/Eloise Minor:    Cognitive Issues: Performance falls broadly within normal limits across most cognitive domains. She has an isolated weakness on a drawing task and mild  variability in processing speed. The findings do not reflect dementia or MCI.    Psychiatric Issues: She has a lifelong history of depression and anxiety. She had a suicide attempt via overdose on Tylenol in her 20's, and no subsequent attempts. She endorsed ongoing passive suicidal ideation (she thinks about how her children would be able to collect her life insurance), but no plan or intent. She is not currently working with a psychiatrist or psychologist. She endorses severe symptoms of depression and anxiety. It is recommended that she reestablish care with a psychologist and psychiatrist prior to scheduling surgery. Once these are adequately managed, then she appears to be a good candidate for surgery from a neurocognitive and psychosocial perspective.        Imaging on:  6/11/24    Impression:    Impression:  1.  No acute intracranial pathology or abnormal intracranial  enhancement.  2.  Mild leukoaraiosis.   Referring Provider: Dr. Moon  Movement Disorders Neurologist: Dr. Moon  DBS Follow-up Internal vs external: Internal  Neurosurgeon: Dr. Schumacher     Patient discussed at conference on: 7/25/24  Candidate:  yes  Follow up testing or prerequisites: Need 2 letters of support: one from psychiatrist and one from licensed mental health provider BEFORE surgery scheduling  Surgical Pathway: Bilateral staged    Brain Target: VIM  1st Lead laterality: right  Device and type: Medtronic Percept RC  IPG location:  Right Chest     Authorization to discuss Protected Health Information: Yes  Healthcare Directive:  No  Mychart use:  Yes  Research interest: Unknown  MyChart : Yes

## 2024-07-16 ENCOUNTER — MYC MEDICAL ADVICE (OUTPATIENT)
Dept: NEUROLOGY | Facility: CLINIC | Age: 64
End: 2024-07-16
Payer: COMMERCIAL

## 2024-07-22 ENCOUNTER — PRE VISIT (OUTPATIENT)
Dept: NEUROSURGERY | Facility: CLINIC | Age: 64
End: 2024-07-22

## 2024-07-22 ENCOUNTER — VIRTUAL VISIT (OUTPATIENT)
Dept: NEUROSURGERY | Facility: CLINIC | Age: 64
End: 2024-07-22
Payer: COMMERCIAL

## 2024-07-22 VITALS — WEIGHT: 168 LBS | HEIGHT: 67 IN | BODY MASS INDEX: 26.37 KG/M2

## 2024-07-22 DIAGNOSIS — I42.8 NON-ISCHEMIC CARDIOMYOPATHY (H): Primary | ICD-10-CM

## 2024-07-22 DIAGNOSIS — G25.0 ESSENTIAL TREMOR: ICD-10-CM

## 2024-07-22 DIAGNOSIS — I47.10 PAROXYSMAL SVT (SUPRAVENTRICULAR TACHYCARDIA) (H): ICD-10-CM

## 2024-07-22 PROCEDURE — 99205 OFFICE O/P NEW HI 60 MIN: CPT | Mod: 95 | Performed by: NEUROLOGICAL SURGERY

## 2024-07-22 ASSESSMENT — PAIN SCALES - GENERAL: PAINLEVEL: NO PAIN (0)

## 2024-07-22 NOTE — LETTER
"7/22/2024       RE: Aida Zhong  [address]  [facility] [address]     Dear Colleague,    Thank you for referring your patient, Aida Zhong, to the [facility]IC [place] at [facility]. Please see a copy of my visit note below.    Additional provider notes: insert own note template here      Ms. Aida Zhong complains of    Chief Complaint   Patient presents with    Consult     Essential Tremor - DBS candidacy evaluation       I have reviewed and updated the patient's Past Medical History, Social History, Family History and Medication List.    ALLERGIES       Allergies   Allergen Reactions    Penicillins Rash         ASSESSMENT  64 year old left handed female  Essential tremor  Bilateral tremor, right side is slightly worse      I have reviewed the note as documented above.  This accurately captures the substance of my conversation with the patient.  The following were discussed in detail.      Ms. Aida Zhong is a 64 year old left handed female who presents for DBS candidacy evaluation for her essential tremor diagnosis.  She is being seen for video consultation.    Patient states that her tremor began when she was 13 years old.  She was at [employer] and people noted her tremor when she was lighting a candle.  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  She was ultimately diagnosed with essential tremor in 2014.  Her tremor has progressed and it is now interfering with drinking and eating.  She has seen Dr. Moon who is recommending DBS workup.    Patient states that she is very familiar with DBS and she has been \"following\" the therapy and its development for quite some time.  Her goal for DBS is to better control her tremor, to be able to eat without spilling and to put makeup on without poking herself in the eye.  She also would like to be able to play with her grandchildren without having " tremor.  Her tremor is bilateral and she would like her left side treated first.    She would like a rechargeable generator/battery and her first choice is Medtronic, followed by [employer] being the second choice.    We discussed the potential outcome of the DBS candidacy evaluation.  Upon completion of the evaluation, patient's case will be dicussed at the Movement Disorder Consensus Group Meeting.  Patient may not be considered to be a good candidate.  If that is the case, the group will provide a reason for our recommendation against DBS.  Patient may also be considered to be a good candidate and wait and see strategy may be recommended.  Patient may also be considered to be a good candidate and staged bilateral strategy may be recommended.    We discussed both phase I and II of DBS surgery.  We discussed that during phase I, we would place the DBS electrode on the right side under MAC and microelectrode recording.  She would then return one week later for the phase II with placement of the DBS generator/battery over the right chest wall under general anesthesia.  If she is a unilateral candidate or wait and see strategy candidate, then she will undergo another evaluation/discussion prior to proceeding to the left side implantation.  If she is a bilateral candidate in a staged fashion, she would return three weeks later for the phase I and II combined for the placement of the DBS electrode on the left side under MAC and microelectrode recording followed by connection to the previously implanted generator/battery.     Briefly, following topic was discussed.    Medtronic  MRI compatible.  Non-rechargeable and rechargeable generator/battery available.  Sensing generator/battery available.  8 contact segmented/directional electrode  Communication method: wireless  Indications: Parkinson's disease, essential tremor, dystonia, epilepsy and OCD    [employer]  MRI compatible.  Non-rechargeable generator/battery  available.  Rechargeable generator/battery is not yet available.  8 contact segmented/directional electrode.  Communication method: Wireless/bluetooth.  Remote programming.  Indications: Parkinson's disease, essential tremor    [employer]  Non-rechargeable and rechargeable generator/battery  MRI compatible, with certain conditions.  8 contact electrode.  Ring contacts or segmented contacts.  Independent current control at each contact.  Communication method: Wireless.  Indications: Parkinson's disease, essential tremor    I did discuss that the implant technique for these devices are relatively the same which was discussed again.  They all have similar risks associated with the surgery.    Risks, benefits, alternative therapies were discussed with the patient, including but not limited to infection and bleeding (intracranial), injury to the brain, stroke, seizure, death, hardware failure and possible need for more surgeries.  Surgical procedure was discussed in detail.  All questions were answered, and she expressed understanding.     I did review her MRI of the brain without/with gadolinium from 6/11/2024.  All the necessary sequences were present.  Imaging quality seem to be ok for neuronavigation.  There were no anatomical contraindication for surgery.    Her case will be discussed at the Movement Disorder Consensus Group Meeting to determine whether she is a good candidate for DBS surgery.      PLAN  1.  We will discuss her case at the Movement Disorder Consensus Group Meeting, and we will contact her regarding her DBS candidacy.         45 minutes were spent on video with the patient of which more than 50% of the time was spent counseling and discussing the above issues regarding diagnosis, differential, treatment options, and steps for further evaluation.  5 minutes were spent reviewing patient's chart, imaging in PACS.  15 minutes were spent on documentation for this encounter.  65 minutes total were  spent on this encounter today.        Again, thank you for allowing me to participate in the care of your patient.      Sincerely,    Hermann Schumacher MD

## 2024-07-22 NOTE — PROGRESS NOTES
"Virtual Visit Details    Type of service:  Video Visit   Video Start Time:  12:48 PM  Video End Time: 1:33 PM    Originating Location (pt. Location): Home    Distant Location (provider location):  On-site  Platform used for Video Visit: Grecia      Additional provider notes: insert own note template here      Ms. Aida Zhong complains of    Chief Complaint   Patient presents with    Consult     Essential Tremor - DBS candidacy evaluation       I have reviewed and updated the patient's Past Medical History, Social History, Family History and Medication List.    ALLERGIES       Allergies   Allergen Reactions    Penicillins Rash         ASSESSMENT  64 year old left handed female  Essential tremor  Bilateral tremor, right side is slightly worse      I have reviewed the note as documented above.  This accurately captures the substance of my conversation with the patient.  The following were discussed in detail.      Ms. Aida Zhong is a 64 year old left handed female who presents for DBS candidacy evaluation for her essential tremor diagnosis.  She is being seen for video consultation.    Patient states that her tremor began when she was 13 years old.  She was at [employer] and people noted her tremor when she was lighting a candle.  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  She was ultimately diagnosed with essential tremor in 2014.  Her tremor has progressed and it is now interfering with drinking and eating.  She has seen Dr. Moon who is recommending DBS workup.    Patient states that she is very familiar with DBS and she has been \"following\" the therapy and its development for quite some time.  Her goal for DBS is to better control her tremor, to be able to eat without spilling and to put makeup on without poking herself in the eye.  She also would like to be able to play with her grandchildren without having tremor.  Her tremor is bilateral and she would like her left side " treated first.    She would like a rechargeable generator/battery and her first choice is Medtronic, followed by [employer] being the second choice.    We discussed the potential outcome of the DBS candidacy evaluation.  Upon completion of the evaluation, patient's case will be dicussed at the Movement Disorder Consensus Group Meeting.  Patient may not be considered to be a good candidate.  If that is the case, the group will provide a reason for our recommendation against DBS.  Patient may also be considered to be a good candidate and wait and see strategy may be recommended.  Patient may also be considered to be a good candidate and staged bilateral strategy may be recommended.    We discussed both phase I and II of DBS surgery.  We discussed that during phase I, we would place the DBS electrode on the right side under MAC and microelectrode recording.  She would then return one week later for the phase II with placement of the DBS generator/battery over the right chest wall under general anesthesia.  If she is a unilateral candidate or wait and see strategy candidate, then she will undergo another evaluation/discussion prior to proceeding to the left side implantation.  If she is a bilateral candidate in a staged fashion, she would return three weeks later for the phase I and II combined for the placement of the DBS electrode on the left side under MAC and microelectrode recording followed by connection to the previously implanted generator/battery.     Briefly, following topic was discussed.    Medtronic  MRI compatible.  Non-rechargeable and rechargeable generator/battery available.  Sensing generator/battery available.  8 contact segmented/directional electrode  Communication method: wireless  Indications: Parkinson's disease, essential tremor, dystonia, epilepsy and OCD    [employer]  MRI compatible.  Non-rechargeable generator/battery available.  Rechargeable generator/battery is not yet available.  8  contact segmented/directional electrode.  Communication method: Wireless/bluetooth.  Remote programming.  Indications: Parkinson's disease, essential tremor    [email or web address]  Non-rechargeable and rechargeable generator/battery  MRI compatible, with certain conditions.  8 contact electrode.  Ring contacts or segmented contacts.  Independent current control at each contact.  Communication method: Wireless.  Indications: Parkinson's disease, essential tremor    I did discuss that the implant technique for these devices are relatively the same which was discussed again.  They all have similar risks associated with the surgery.    Risks, benefits, alternative therapies were discussed with the patient, including but not limited to infection and bleeding (intracranial), injury to the brain, stroke, seizure, death, hardware failure and possible need for more surgeries.  Surgical procedure was discussed in detail.  All questions were answered, and she expressed understanding.     I did review her MRI of the brain without/with gadolinium from 6/11/2024.  All the necessary sequences were present.  Imaging quality seem to be ok for neuronavigation.  There were no anatomical contraindication for surgery.    Her case will be discussed at the Movement Disorder Consensus Group Meeting to determine whether she is a good candidate for DBS surgery.      PLAN  1.  We will discuss her case at the Movement Disorder Consensus Group Meeting, and we will contact her regarding her DBS candidacy.       Video-Visit Details    Type of service:  Video Visit    Video Start Time: 12:48 PM  Video End Time: 1:33 PM  Video -Visit Time: 45 minutes    Originating Location (pt. Location): Home    Distant Location (provider location):  [facility] NEUROSURGERY     Platform used for Video Visit: Grecia Schumacher MD, PhD      45 minutes were spent on video with the patient of which more than 50% of the time was spent counseling and  discussing the above issues regarding diagnosis, differential, treatment options, and steps for further evaluation.  5 minutes were spent reviewing patient's chart, imaging in PACS.  15 minutes were spent on documentation for this encounter.  65 minutes total were spent on this encounter today.

## 2024-07-22 NOTE — NURSING NOTE
Current patient location: [address]   [facility] [address]    Is the patient currently in the state of [place]? YES    Visit mode:VIDEO    If the visit is dropped, the patient can be reconnected by: VIDEO VISIT: Text to cell phone:   Telephone Information:   Mobile [phone]       Will anyone else be joining the visit? NO  (If patient encounters technical issues they should call [phone]    How would you like to obtain your AVS? MyChart    Are changes needed to the allergy or medication list? No    Are refills needed on medications prescribed by this physician? NO    Reason for visit: Consult    Bobbilynn Grossaint VVF

## 2024-07-22 NOTE — TELEPHONE ENCOUNTER
Spoke to patient. Notified form is complete and in chart.  She will call back with any additional questions     Ashia Cope MA

## 2024-07-23 NOTE — TELEPHONE ENCOUNTER
Left message for patient. Advised that pt forms have been completed and to contact our clinic with any further questions or concerns.      Jonelle Maher MA

## 2024-07-24 ENCOUNTER — ANCILLARY PROCEDURE (OUTPATIENT)
Dept: MAMMOGRAPHY | Facility: CLINIC | Age: 64
End: 2024-07-24
Payer: COMMERCIAL

## 2024-07-24 DIAGNOSIS — Z12.31 VISIT FOR SCREENING MAMMOGRAM: ICD-10-CM

## 2024-07-24 PROCEDURE — 77067 SCR MAMMO BI INCL CAD: CPT | Mod: TC | Performed by: RADIOLOGY

## 2024-07-24 PROCEDURE — 77063 BREAST TOMOSYNTHESIS BI: CPT | Mod: TC | Performed by: RADIOLOGY

## 2024-07-26 ENCOUNTER — TELEPHONE (OUTPATIENT)
Dept: NEUROLOGY | Facility: CLINIC | Age: 64
End: 2024-07-26
Payer: COMMERCIAL

## 2024-07-26 NOTE — TELEPHONE ENCOUNTER
From 7/25/24 Consensus:    1. Candidate: Yes. Consensus group requires that she reestablish care with a psychologist and psychiatrist and obtain letters of support from both, prior to scheduling surgery. She saw ZORAIDA Quijano on 7/15, [facility] scheduled for 8/1/24 with Holly Thompson, 9/4/24 scheduled with Christel Lancaster LICSW to establish care.    2. The plan for surgery: Staged BVIM, starting with RVIM, IPG in right chest     3. The : [employer] Percept RC in right chest     4. The motor symptoms we are treating include: tremor  5. Patient goal: reduce tremor, improve computer use, wants to work at least some sort of job, wants to be able to hold grandchildren more comfortably, reduce social anxiety. Would like to start on the left but would ultimately want bilateral DBS with a rechargeable battery.    Do we have all the imaging sequences needed? Yes per Dr. Winsome Carrion side effects:   Metoprolol: fatigue/somnolence, worsening depression  Propranolol: fatigue/somnolence, worsening depression  Primidone: lethargy, brain fog, imbalance  Topiramate: never taken, but has hx nephrolithiasis    Other indication for surgery: Medically refractory tremor  Brain MRI findings: Mild leukoaraiosis    6. [facility] Care Companion: YES  7. Research?    Authorization to discuss Protected Health Information: Yes  Healthcare Directive:  No  Mychart use:  Yes  --------------------  MyChart message sent to patient with the details.

## 2024-08-05 ENCOUNTER — HOSPITAL ENCOUNTER (OUTPATIENT)
Dept: MAMMOGRAPHY | Facility: CLINIC | Age: 64
Discharge: HOME | End: 2024-08-05
Attending: INTERNAL MEDICINE
Payer: COMMERCIAL

## 2024-08-05 ENCOUNTER — HOSPITAL ENCOUNTER (OUTPATIENT)
Dept: ULTRASOUND IMAGING | Facility: CLINIC | Age: 64
Discharge: HOME | End: 2024-08-05
Attending: INTERNAL MEDICINE
Payer: COMMERCIAL

## 2024-08-05 DIAGNOSIS — R92.8 ABNORMAL MAMMOGRAM: ICD-10-CM

## 2024-08-05 PROCEDURE — 76642 ULTRASOUND BREAST LIMITED: CPT | Mod: RT

## 2024-08-05 PROCEDURE — 77065 DX MAMMO INCL CAD UNI: CPT | Mod: RT

## 2024-08-06 ENCOUNTER — HOSPITAL ENCOUNTER (OUTPATIENT)
Dept: ULTRASOUND IMAGING | Facility: CLINIC | Age: 64
Discharge: HOME | End: 2024-08-06
Attending: INTERNAL MEDICINE
Payer: COMMERCIAL

## 2024-08-06 ENCOUNTER — HOSPITAL ENCOUNTER (OUTPATIENT)
Dept: MAMMOGRAPHY | Facility: CLINIC | Age: 64
Discharge: HOME | End: 2024-08-06
Attending: INTERNAL MEDICINE
Payer: COMMERCIAL

## 2024-08-06 DIAGNOSIS — R92.8 ABNORMAL MAMMOGRAM: ICD-10-CM

## 2024-08-06 PROCEDURE — 999N000065 MA POST PROCEDURE RIGHT

## 2024-08-06 PROCEDURE — 88305 TISSUE EXAM BY PATHOLOGIST: CPT | Mod: TC | Performed by: INTERNAL MEDICINE

## 2024-08-06 PROCEDURE — 250N000009 HC RX 250: Performed by: RADIOLOGY

## 2024-08-06 PROCEDURE — 88305 TISSUE EXAM BY PATHOLOGIST: CPT | Mod: 26 | Performed by: PATHOLOGY

## 2024-08-06 PROCEDURE — 272N000615 US BREAST BIOPSY CORE NEEDLE RIGHT

## 2024-08-06 RX ADMIN — LIDOCAINE HYDROCHLORIDE 5 ML: 10 INJECTION, SOLUTION EPIDURAL; INFILTRATION; INTRACAUDAL; PERINEURAL at 08:04

## 2024-08-06 NOTE — DISCHARGE INSTRUCTIONS

## 2024-08-07 ENCOUNTER — TELEPHONE (OUTPATIENT)
Dept: MAMMOGRAPHY | Facility: CLINIC | Age: 64
End: 2024-08-07
Payer: COMMERCIAL

## 2024-08-07 LAB
PATH REPORT.COMMENTS IMP SPEC: NORMAL
PATH REPORT.COMMENTS IMP SPEC: NORMAL
PATH REPORT.FINAL DX SPEC: NORMAL
PATH REPORT.GROSS SPEC: NORMAL
PATH REPORT.MICROSCOPIC SPEC OTHER STN: NORMAL
PHOTO IMAGE: NORMAL

## 2024-08-07 NOTE — TELEPHONE ENCOUNTER
Pathology report reviewed with our breast radiologist Dr Byrd, who confirmed the recent breast imaging is concordant with the final pathology results below.    I phoned Ms Zhong, confirmed her full name, date of birth, and informed patient of her ultrasound Guided right Breast Needle Biopsy (08/06/24) results showing benign - Fibroadenoma.     Recommended follow up is routine screening.   Patient states no problems or concerns with her biopsy site.   Questions were answered and my phone number given if she has further questions or concerns.  I informed patient I will notify the ordering provider of the results and recommendations for follow up.  Patient verbalized understanding and agrees with the plan of care.     Cassy Whiting RN CBCN  Breast Care Nurse Coordinator  [facility]  [phone]

## 2024-08-12 ENCOUNTER — LAB (OUTPATIENT)
Dept: LAB | Facility: CLINIC | Age: 64
End: 2024-08-12
Payer: COMMERCIAL

## 2024-08-12 DIAGNOSIS — G25.0 ESSENTIAL TREMOR: ICD-10-CM

## 2024-08-12 DIAGNOSIS — Z79.899 ENCOUNTER FOR LONG-TERM (CURRENT) USE OF MEDICATIONS: ICD-10-CM

## 2024-08-12 LAB
ALBUMIN SERPL BCG-MCNC: 4.3 G/DL (ref 3.5–5.2)
ALP SERPL-CCNC: 135 U/L (ref 40–150)
ALT SERPL W P-5'-P-CCNC: 40 U/L (ref 0–50)
ANION GAP SERPL CALCULATED.3IONS-SCNC: 14 MMOL/L (ref 7–15)
AST SERPL W P-5'-P-CCNC: 39 U/L (ref 0–45)
BILIRUB DIRECT SERPL-MCNC: <0.2 MG/DL (ref 0–0.3)
BILIRUB SERPL-MCNC: 0.4 MG/DL
BUN SERPL-MCNC: 14.5 MG/DL (ref 8–23)
CALCIUM SERPL-MCNC: 9.1 MG/DL (ref 8.8–10.4)
CHLORIDE SERPL-SCNC: 105 MMOL/L (ref 98–107)
CREAT SERPL-MCNC: 0.74 MG/DL (ref 0.51–0.95)
EGFRCR SERPLBLD CKD-EPI 2021: 90 ML/MIN/1.73M2
GLUCOSE SERPL-MCNC: 125 MG/DL (ref 70–99)
HCO3 SERPL-SCNC: 21 MMOL/L (ref 22–29)
HOLD SPECIMEN: NORMAL
POTASSIUM SERPL-SCNC: 3.8 MMOL/L (ref 3.4–5.3)
PROT SERPL-MCNC: 6.9 G/DL (ref 6.4–8.3)
SODIUM SERPL-SCNC: 140 MMOL/L (ref 135–145)

## 2024-08-12 PROCEDURE — 82248 BILIRUBIN DIRECT: CPT

## 2024-08-12 PROCEDURE — 36415 COLL VENOUS BLD VENIPUNCTURE: CPT

## 2024-08-12 PROCEDURE — 80053 COMPREHEN METABOLIC PANEL: CPT

## 2024-08-12 PROCEDURE — 82306 VITAMIN D 25 HYDROXY: CPT

## 2024-08-12 PROCEDURE — 84443 ASSAY THYROID STIM HORMONE: CPT

## 2024-08-13 DIAGNOSIS — Z79.899 ENCOUNTER FOR LONG-TERM (CURRENT) USE OF MEDICATIONS: Primary | ICD-10-CM

## 2024-08-13 LAB
TSH SERPL DL<=0.005 MIU/L-ACNC: 0.83 UIU/ML (ref 0.3–4.2)
VIT D+METAB SERPL-MCNC: 30 NG/ML (ref 20–50)

## 2024-09-19 ENCOUNTER — MYC MEDICAL ADVICE (OUTPATIENT)
Dept: NEUROLOGY | Facility: CLINIC | Age: 64
End: 2024-09-19
Payer: COMMERCIAL

## 2024-09-19 NOTE — LETTER
To Whom It May Concern,     I am writing to verify that I have been caring for Aida Zhong in the management of essential tremor. Essential tremor is a progressive neurologic disorder that affects a person's ability to do motor tasks without shaking. Tremors can be present in the head, face, arms, legs, and trunk. As it progresses, it can affect the individual's ability to complete fine motor activities due to coordination being affected by a tremor. It can also affect balance.    There is no known cure for essential tremor. We often try to decrease the tremor with medications but unfortunately also frequently have side effects such as sedation, confusion, balance issues and falls. Unfortunately, Ms. Zhong has tried all available first & second-line medication options without success. Besides medications, another option for treatment is invasive brain surgery but again this is to hopefully reduce the tremor and is not a cure. It also comes with its own surgical risks.     Please take this diagnosis into account when considering Ms. Zhong's request for disability.     Sincerely,         Jessika Moon MD    [facility]  Department of Neurology

## 2024-09-22 LAB — PHQ9 SCORE: 14

## 2024-09-24 NOTE — TELEPHONE ENCOUNTER
Please review pt message and advise on writing a letter regarding pt's dx and symptoms for Social Security Disability.      CAMI Raza, BSN  [facility]

## 2024-09-25 LAB — PHQ9 SCORE: 15

## 2024-10-01 ENCOUNTER — TRANSFERRED RECORDS (OUTPATIENT)
Dept: HEALTH INFORMATION MANAGEMENT | Facility: CLINIC | Age: 64
End: 2024-10-01
Payer: COMMERCIAL

## 2025-04-15 ENCOUNTER — TELEPHONE (OUTPATIENT)
Dept: NEUROLOGY | Facility: CLINIC | Age: 65
End: 2025-04-15

## 2025-04-15 DIAGNOSIS — G25.0 ESSENTIAL TREMOR: Primary | ICD-10-CM

## 2025-04-15 NOTE — TELEPHONE ENCOUNTER
Letter of support for Deep Brain Stimulation surgery received from FABI Dunn, [facility]. Also she noted via telephone encounter the answer to our questions (4/15/25). Letter sent to be scanned.  ------------------------------------------------------------  [facility]  [address]  [facility] [address]  [phone]    [facility]  [id number]  [place] [address]    Dear [facility],    I am writing to you today for continuation of care as requested by our mutual patient, Aida Zhong ( 1960) for neurosurgery. I have been working as Aida's general mental health therapist since 2024. 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 As a licensed therapist, it is beyond my scope of practice to state whether Aida should participate in surgery and/or stay awake for it. However, I do believe necessary surgical interventions may benefit her and improve her quality of life. I recommend she continues regular psychotherapy on a biweekly-monthly basis to prevent de-compensation and continue coping skills for her diagnoses.     Sincerely,      FABI Dunn, [facility]  Licensed Therapist & Clinical Supervisor - Dual Licensed in [place]  [facility] Connection Line: [phone]

## 2025-04-16 ENCOUNTER — TELEPHONE (OUTPATIENT)
Dept: NEUROSURGERY | Facility: CLINIC | Age: 65
End: 2025-04-16

## 2025-04-16 NOTE — TELEPHONE ENCOUNTER
Called patient to schedule surgery with Dr. Hermann Schumacher    Spoke with:  Aida (patient)    Date of Surgery: 5/13/2025, 5/22/2025, and 6/20/2025    Estimated Arrival time Discussed with Patient:  No    Location of surgery: [facility]     Pre-Op H&P: 4/28/2025 via video with PAC    *PATIENT WAS NOTIFIED THAT VIRTUAL PAC NEEDED TO BE COMPLETED IN THE [facility]*    Post-Op Appts: 7/7/2025 with Dr. Hermann Schumacher     Imaging:  Yes - CT scheduled for the morning of surgery    Discussed with patient PAC RN will provide arrival time and instructions for surgery at the time of the appointment: [[place] locations only]: Yes    Packet sent out: No 04/16/25  via "[employer]    Vendor/Rep/Monitoring:   Yes via [employer]    Additional Comments:      All patients questions were answered and patient was instructed to review surgical packet and call back with any questions or concerns.       Patricia Zaragoza on 4/16/2025 at 3:20 PM

## 2025-04-17 DIAGNOSIS — Z01.818 PREOPERATIVE EVALUATION TO RULE OUT SURGICAL CONTRAINDICATION: Primary | ICD-10-CM

## 2025-04-17 NOTE — TELEPHONE ENCOUNTER
FUTURE VISIT INFORMATION      SURGERY INFORMATION:  Date: 25  Location: [facility] OR  Surgeon:  Hermann Schumacher MD   Anesthesia Type:  MAC with Local   Procedure: Right side deep brain stimulator placement, phase I, placement of right side deep brain stimulator electrode, target right ventral intermediate nucleus of the thalamus, with microelectrode recording  Consult: 24    RECORDS REQUESTED FROM:       Primary Care Provider: Nasreen Carrillo PA - [facility]     Pertinent Medical History: h/o atrial tachycardia; Non-ischemic cardiomyopathy; Pure hypercholesterolemia; paroxysmal SVT    Most recent EKG+ Tracin23 - [facility]     Most recent ECHO: 20 - [facility]     Most recent Cardiac Stress Test: 04 - [facility]

## 2025-04-22 ENCOUNTER — TELEPHONE (OUTPATIENT)
Dept: NEUROLOGY | Facility: CLINIC | Age: 65
End: 2025-04-22
Payer: COMMERCIAL

## 2025-04-22 DIAGNOSIS — G25.0 ESSENTIAL TREMOR: Primary | ICD-10-CM

## 2025-04-22 NOTE — TELEPHONE ENCOUNTER
Initial programming check list:    CT head ordered and scheduled x  Neuropsych order placed for 1 year follow up x  Letter composed x  Datebase updated x  Appointments scheduled x  [facility]    The patient was informed of their initial programming appointments on 6/6/25 with Dr. Hills and on 7/24/25 with Dr. Ospina. The patient was informed to try to keep their initial programming appointments as they require more time and appointment coordination. The patient is aware these neurologist she is seeing are just interim providers and that Dr. Moon is still her neurologist.    The patient was also informed of their DBS programming follow up appointments on 8/18/25 and 9/15/25, both with Dr. Moon, and that they can be canceled if they are not needed. The patient was reminded to bring their patient  fully charged if it's a rechargeable. The patient was also informed they will be repeating the neuropsychological evaluation, 1 year after their DBS surgery.

## 2025-04-28 ENCOUNTER — PRE VISIT (OUTPATIENT)
Dept: SURGERY | Facility: CLINIC | Age: 65
End: 2025-04-28

## 2025-04-28 ENCOUNTER — ANESTHESIA EVENT (OUTPATIENT)
Dept: SURGERY | Facility: CLINIC | Age: 65
DRG: 027 | End: 2025-04-28
Payer: COMMERCIAL

## 2025-04-28 ENCOUNTER — VIRTUAL VISIT (OUTPATIENT)
Dept: SURGERY | Facility: CLINIC | Age: 65
End: 2025-04-28
Payer: COMMERCIAL

## 2025-04-28 VITALS — WEIGHT: 153 LBS | HEIGHT: 68 IN | BODY MASS INDEX: 23.19 KG/M2

## 2025-04-28 DIAGNOSIS — Z01.818 PRE-OP EVALUATION: Primary | ICD-10-CM

## 2025-04-28 PROCEDURE — 98001 SYNCH AUDIO-VIDEO NEW LOW 30: CPT | Performed by: PHYSICIAN ASSISTANT

## 2025-04-28 RX ORDER — METHYLPHENIDATE HYDROCHLORIDE 10 MG/1
15 TABLET ORAL 2 TIMES DAILY
COMMUNITY
Start: 2025-01-13

## 2025-04-28 ASSESSMENT — PAIN SCALES - GENERAL: PAINLEVEL_OUTOF10: NO PAIN (0)

## 2025-04-28 ASSESSMENT — ENCOUNTER SYMPTOMS: SEIZURES: 0

## 2025-04-28 ASSESSMENT — LIFESTYLE VARIABLES: TOBACCO_USE: 0

## 2025-04-28 NOTE — PATIENT INSTRUCTIONS
Preparing for Your Surgery      Name:  Aida Zhong   MRN:  [id number]   :  1960   Today's Date:  2025     The [place] [employer] [address] Construction Project                                Timeline 2025 -2025    This project will affect travel to the [facility] and [facility], as well as the [facility].      Please check the [facility] [address] project website for the most up to date information and give yourself additional time to reach your destination.        Arriving for surgery:  Surgery date:  2025  Arrival time:  6:00 am    Please come to:         [facility]    [address] [place]  [address]     The [facility] [place] Patient/Visitor Ramp is at [address]. Patients and visitors who self-park will receive the reduced hospital parking rate. If the Patient /Visitor Ramp is full, please follow the signs to the Climateminder car park located at the [facility] entrance.      Fittr parking is available (24 hours/ 7 days a week)      Discounted parking pass options are available for patients and visitors. They can be purchased at the [employer] desk at the [facility] entrance.     -    Stop at the security desk and they will direct surgery patients to the Surgery Check in and Family Lounge. [phone]        - If you need directions, a wheelchair or an escort please stop at the Information/security desk in the lobby.     What can I eat or drink?  -  You may eat and drink normally up to 8 hours prior to arrival time. (Until 25 at 10 pm)  -  You may have clear liquids until 2 hours prior to arrival time. (Until 4 am)    Examples of clear liquids:  Water  Clear broth  Juices (apple, white grape, white cranberry  and cider) without pulp  Noncarbonated, powder based beverages  (lemonade and Sky-Aid)  Sodas  (Barbara, 7-Up, ginger ale and seltzer)  Coffee or tea (without milk or cream)  Gatorade    -  No Alcohol or cannabis products for at least 24 hours before surgery.     Which medicines can I take?    Hold Aspirin for 7 days before surgery.   Hold Multivitamins for 7 days before surgery.  Hold Supplements for 7 days before surgery.  Hold Ibuprofen (Advil, Motrin) for 1 day(s) before surgery--unless otherwise directed by surgeon.  Hold Naproxen (Aleve) for 4 days before surgery.      -  PLEASE TAKE these medications the day of surgery:  NONE      How do I prepare myself?  - Please take 2 showers (one the night prior to surgery and one the morning of surgery) using Scrubcare or Hibiclens soap.    Use this soap only from the neck to your toes. Avoid genital area      Leave the soap on your skin for one minute--then rinse thoroughly.      You may use your own shampoo and conditioner. No other hair products.   - Please remove all jewelry and body piercings.  - No lotions, deodorants or fragrance.  - No makeup or fingernail polish.   - Bring your ID and insurance card.    -For patients being admitted to the [facility]  Family members are to take the patient belongings with them and place them in the lockers provided in the Family Lounge.  Please limit the items you bring to 1 bag as the lockers are small.      -If you use a CPAP machine, please bring the CPAP machine, tubing, and mask to hospital.    -If you have a Deep Brain Stimulator, Spinal Cord Stimulator, or any Neuro Stimulator device---you must bring the remote control to the hospital.      ALL PATIENTS GOING HOME THE SAME DAY OF SURGERY ARE REQUIRED TO HAVE A RESPONSIBLE ADULT TO DRIVE AND BE IN ATTENDANCE WITH THEM FOR 24 HOURS FOLLOWING SURGERY.    Covid testing policy as of 12/06/2022  Your surgeon will notify and schedule you for a COVID test if one is needed before surgery--please direct any questions or COVID symptoms to your surgeon      Questions or  Concerns:    - For any questions regarding the day of surgery or your hospital stay, please contact the Pre Admission Nursing Office at [phone].       - If you have health changes between today and your surgery, please call your surgeon.       - For questions after surgery, please call your surgeons office.           Current Visitor Guidelines    2 adult visitors for adult patients in the pre op area    If additional visitors come (beyond a patient care attendant or a group home caregiver), the additional visitors will be asked to wait in the main lobby of the hospital    Visiting hours: 8 a.m. to 8:30 p.m.    Patients confirmed or suspected to have symptoms of COVID 19 or flu:     No visitors allowed for adult patients.   Children (under age 18) can have 1 named visitor.     People who are sick or showing symptoms of COVID 19 or flu:    Are not allowed to visit patients--we can only make exceptions in special situations.       Please follow these guidelines for your visit:          Please maintain social distance          Masking is optional--however at times you may be asked to wear a mask for the safety of yourself and others     Clean your hands with alcohol hand . Do this when you arrive at and leave the building and patient room,    And again after you touch your mask or anything in the room.     Go directly to and from the room you are visiting.     Stay in the patient s room during your visit. Limit going to other places in the hospital as much as possible     Leave bags and jackets at home or in the car.     For everyone s health, please don t come and go during your visit. That includes for smoking   during your visit.

## 2025-04-28 NOTE — H&P
Pre-Operative H & P     CC:  Preoperative exam to assess for increased cardiopulmonary risk while undergoing surgery and anesthesia.    Date of Encounter: 4/28/2025  Primary Care Physician:  Nasreen Carrillo     Reason for visit:   Encounter Diagnosis   Name Primary?    Pre-op evaluation Yes       HPI  Aida Zhong is a 65 year old female who presents for pre-operative H & P in preparation for  Procedure Information       Case: [id number] Date/Time: 05/13/25 0800    Procedure: Right side deep brain stimulator placement, phase I, placement of right side deep brain stimulator electrode, target right ventral intermediate nucleus of the thalamus, with microelectrode recording (Right: Head)    Anesthesia type: MAC with Local    Diagnosis: Essential tremor [G25.0]    Pre-op diagnosis: Essential tremor [G25.0]    Location:  OR [address] [place] [address] OR    Providers: Hermann Schumacher MD            Patient is being evaluated for comorbid conditions of dyslipidemia, depression, PSVT, non ischemic cardiomyopathy     Ms. Zhong follows with neurology for essential tremor. She was seen by Dr. Schumacher 7/2024 to discuss BDS candidacy. She is now scheduled as above.     History is obtained from the patient and chart review    Hx of abnormal bleeding or anti-platelet use: denies    Menstrual history: No LMP recorded. Patient has had a hysterectomy.     Past Medical History  Past Medical History:   Diagnosis Date    Depression     Essential tremor     HLD (hyperlipidemia)        Past Surgical History  Past Surgical History:   Procedure Laterality Date    ABDOMINOPLASTY      BROW LIFT      DILATION AND CURETTAGE      HC ARTHROSCOPIC REMOVAL GANGLION CYST-WRIST/HAND Right     HC ENLARGE BREAST WITH IMPLANT      HC REMOVAL OF BREAST IMPLANT      HYSTERECTOMY      TONSILLECTOMY         Prior to Admission Medications  Current Outpatient Medications   Medication Sig Dispense Refill    FLUoxetine (PROZAC) 20 MG capsule Take 20 mg by mouth at  bedtime.      FLUoxetine (PROZAC) 40 MG capsule Take 40 mg by mouth at bedtime.      methylphenidate (RITALIN) 10 MG tablet Take 15 mg by mouth 2 times daily.      rosuvastatin (CRESTOR) 5 MG tablet Take 1 tablet by mouth at bedtime         Allergies  Allergies   Allergen Reactions    Penicillins Rash       Social History  Social History     Socioeconomic History    Marital status:      Spouse name: Not on file    Number of children: Not on file    Years of education: Not on file    Highest education level: Not on file   Occupational History    Not on file   Tobacco Use    Smoking status: Never    Smokeless tobacco: Never   Vaping Use    Vaping status: Never Used   Substance and Sexual Activity    Alcohol use: Not Currently    Drug use: Never    Sexual activity: Not on file   Other Topics Concern    Not on file   Social History Narrative    Not on file     Social Drivers of Health     Financial Resource Strain: Medium Risk (3/28/2025)    [email or web address]    Financial Resource Strain     Difficulty of Paying Living Expenses: 2     Difficulty of Paying Living Expenses: 1   Food Insecurity: Food Insecurity Present (3/28/2025)    Received from [facility]    Food Insecurity     Do you worry your food will run out before you are able to buy more?: 2   Transportation Needs: Unmet Transportation Needs (3/28/2025)    Received from [facility]    Transportation Needs     Does lack of transportation keep you from medical appointments?: 2     Does lack of transportation keep you from work, meetings or getting things that you need?: 2   Physical Activity: Not on file   Stress: Not on file   Social Connections: Socially Integrated (3/28/2025)    Received from [facility]    Social Connections     Do you often feel lonely or isolated from those around you?: 0   Interpersonal Safety:  Not on file   Housing Stability: Medium Risk (3/28/2025)    Received from [facility]    Housing Stability     What is your housing situation today?: 2       Family History  Family History   Problem Relation Age of Onset    Tremor Mother     Tremor Sister     Tremor Maternal Grandfather     Tremor Daughter     Tremor Daughter     Anesthesia Reaction No family hx of     Deep Vein Thrombosis (DVT) No family hx of        Review of Systems  The complete review of systems is negative other than noted in the HPI or here.   Anesthesia Evaluation   Pt has had prior anesthetic.     History of anesthetic complications  - PONV.      ROS/MED HX  ENT/Pulmonary:     (+)     ELVIA risk factors, snores loudly,                               (-) tobacco use   Neurologic: Comment: Essential tremor   (-) no seizures and no CVA   Cardiovascular: Comment: H/o PSVT s/p ablation in 2020    (+) Dyslipidemia - -   -  - -                                 Previous cardiac testing   Echo: Date: 2020 Results:   Final Conclusion Previous Study: 06/05/19    1. Normal left ventricular systolic function. Calculated left ventricular ejection fraction   (modified Ruiz technique) is 54 %.    2. No regional wall motion abnormalities.    3. Normal right ventricular chamber size. Normal right ventricular systolic function.    4. No significant valvular heart disease.      Compared to the 6/5/2019 study, the EF has normalized.     Stress Test:  Date: Results:    ECG Reviewed:  Date: 4/2023 Results:  NSR  Cath:  Date: Results:   (-) taking anticoagulants/antiplatelets   METS/Exercise Tolerance: 4 - Raking leaves, gardening Comment: No intentional activity. Has been walking a lot for the bus, can walk a mile without PEREZ or CP    Hematologic:  - neg hematologic  ROS  (-) history of blood clots and history of blood transfusion   Musculoskeletal:  - neg musculoskeletal ROS     GI/Hepatic:  - neg GI/hepatic ROS  (-) GERD    Renal/Genitourinary:  - neg Renal ROS     Endo:  - neg endo ROS  (-) chronic steroid usage   Psychiatric/Substance Use:     (+) psychiatric history depression       Infectious Disease:  - neg infectious disease ROS     Malignancy:       Other:            Virtual visit -  No vitals were obtained    Physical Exam  Constitutional: Awake, alert, cooperative, no apparent distress, and appears stated age.  HENT: Normocephalic  Respiratory: non labored breathing   Neurologic: Awake, alert, oriented to name, place and time.   Neuropsychiatric: Calm, cooperative. Normal affect.      Prior Labs/Diagnostic Studies   All labs and imaging personally reviewed     EKG/ stress test - if available please see in ROS above   No results found.       No data to display                  The patient's records and results personally reviewed by this provider.     Outside records reviewed from: Care Everywhere      Assessment    Aida Zhong is a 65 year old female seen as a PAC referral for risk assessment and optimization for anesthesia.    Plan/Recommendations  Pt will be optimized for the proposed procedure.  See below for details on the assessment, risk, and preoperative recommendations    NEUROLOGY  - No history of TIA, CVA or seizure  -essential tremor with the above procedure planned . / Winsome has ordered preop labs- patient will call to make a lab appointment today   -Post Op delirium risk factors:  No risk identified    ENT  - No current airway concerns.  Will need to be reassessed day of surgery.  Mallampati: Unable to assess  TM: Unable to assess    CARDIAC  -h/o SVT s/p ablation in 2020. Historically associated reduced EF, has since recovered.   -last seen by cardiology 4/2023. At that time, stable and told she can follow with cardiology on a PRN bases.   -today she reports she can walk a mile without exertional symptoms   -previous cardiac testing above   - METS (Metabolic Equivalents)  Patient performs 4 or more METS  "exercise without symptoms             Total Score: 0      RCRI-Very low risk: Class 1 0.4% complication rate             Total Score: 0        PULMONARY  ELVIA Low Risk             Total Score: 2    ELVIA: Snores loudly    ELVIA: Over 50 ys old      - Denies asthma or inhaler use  - Tobacco History    History   Smoking Status    Never   Smokeless Tobacco    Never       GI  PONV High Risk  Total Score: 4           1 AN PONV: Pt is Female    1 AN PONV: Patient is not a current smoker    1 AN PONV: Patient has history of PONV    1 AN PONV: Intended Post Op Opioids        /RENAL  - Baseline Creatinine  WNL    ENDOCRINE    - BMI: Estimated body mass index is 23.26 kg/m  as calculated from the following:    Height as of this encounter: 1.727 m (5' 8\").    Weight as of this encounter: 69.4 kg (153 lb).  Overweight (BMI 25.0-29.9)  -last A1c 5.8    HEME  VTE Low Risk 0.26%             Total Score: 1    VTE: Greater than 59 yrs old      - No history of abnormal bleeding or antiplatelet use.    MSK  Patient is NOT Frail             Total Score: 0      -PM&R referral not indicated    Different anesthesia methods/types have been discussed with the patient, but they are aware that the final plan will be decided by the assigned anesthesia provider on the date of service.    The patient is optimized for their procedure. AVS with information on surgery time/arrival time, meds and NPO status given by nursing staff. No further diagnostic testing indicated.    Please refer to the physical examination documented by the anesthesiologist in the anesthesia record on the day of surgery.    Video-Visit Details    Type of service:  Video Visit    Provider received verbal consent for a Video Visit from the patient? Yes     Originating Location (pt. Location): Other work in [place]    Distant Location (provider location):  Off-site  Mode of Communication:  Video Conference via [employer]  On the day of service:     Prep time: 15 minutes  Visit time: 10 " minutes  Documentation time: 6 minutes  ------------------------------------------  Total time: 31 minutes      Anna Sethi PA-C  Preoperative Assessment Center  [facility]  Clinic and Surgery Center  Phone: [phone]  Fax: [phone]

## 2025-04-28 NOTE — PROGRESS NOTES
Aida is a 65 year old who is being evaluated via a billable video visit.    How would you like to obtain your AVS? MyChart  If the video visit is dropped, the invitation should be resent by: Text to cell phone: [phone]      Subjective   Aida is a 65 year old, presenting for the following health issues:  Pre-Op Exam      HPI          Physical Exam

## 2025-05-05 ENCOUNTER — MYC MEDICAL ADVICE (OUTPATIENT)
Dept: NEUROSURGERY | Facility: CLINIC | Age: 65
End: 2025-05-05
Payer: COMMERCIAL

## 2025-05-06 ENCOUNTER — TELEPHONE (OUTPATIENT)
Dept: NEUROSURGERY | Facility: CLINIC | Age: 65
End: 2025-05-06
Payer: COMMERCIAL

## 2025-05-06 DIAGNOSIS — N39.0 UTI (URINARY TRACT INFECTION): Primary | ICD-10-CM

## 2025-05-06 RX ORDER — CIPROFLOXACIN 500 MG/1
500 TABLET, FILM COATED ORAL 2 TIMES DAILY
Qty: 10 TABLET | Refills: 0 | Status: SHIPPED | OUTPATIENT
Start: 2025-05-06

## 2025-05-06 NOTE — CONFIDENTIAL NOTE
Left VM for pt letting her know that Dr. Schumacher is going to prescribe an antibiotic. Would like to confirm that the pharmacy she uses is the [facility] pharmacy in [place]. Requested pt call back or send a [employer] message.

## 2025-05-12 ASSESSMENT — ENCOUNTER SYMPTOMS: SEIZURES: 0

## 2025-05-12 ASSESSMENT — LIFESTYLE VARIABLES: TOBACCO_USE: 0

## 2025-05-12 NOTE — ANESTHESIA PREPROCEDURE EVALUATION
Anesthesia Pre-Procedure Evaluation    Patient: Aida Zhong   MRN: [id number] : 1960          Procedure : Procedure(s):  Stealth Right side deep brain stimulator placement, phase I, placement of right side deep brain stimulator electrode, target right ventral intermediate nucleus of the thalamus, with microelectrode recording         Past Medical History:   Diagnosis Date    Depression     Essential tremor     HLD (hyperlipidemia)       Past Surgical History:   Procedure Laterality Date    ABDOMINOPLASTY      BROW LIFT      DILATION AND CURETTAGE      HC ARTHROSCOPIC REMOVAL GANGLION CYST-WRIST/HAND Right     HC ENLARGE BREAST WITH IMPLANT      HC REMOVAL OF BREAST IMPLANT      HYSTERECTOMY      TONSILLECTOMY        Allergies   Allergen Reactions    Penicillins Rash      Social History     Tobacco Use    Smoking status: Never    Smokeless tobacco: Never   Substance Use Topics    Alcohol use: Not Currently      Wt Readings from Last 1 Encounters:   25 69.4 kg (153 lb)        Anesthesia Evaluation   Pt has had prior anesthetic.     History of anesthetic complications  - PONV.      ROS/MED HX  ENT/Pulmonary:     (+)     ELVIA risk factors, snores loudly,                               (-) tobacco use   Neurologic: Comment: Essential tremor   (-) no seizures and no CVA   Cardiovascular: Comment: H/o PSVT s/p ablation in     (+) Dyslipidemia - -   -  - -                                 Previous cardiac testing   Echo: Date:  Results:   Final Conclusion Previous Study: 19    1. Normal left ventricular systolic function. Calculated left ventricular ejection fraction   (modified Ruiz technique) is 54 %.    2. No regional wall motion abnormalities.    3. Normal right ventricular chamber size. Normal right ventricular systolic function.    4. No significant valvular heart disease.      Compared to the 2019 study, the EF has normalized.     Stress Test:  Date: Results:    ECG Reviewed:  Date:  "4/2023 Results:  NSR  Cath:  Date: Results:   (-) taking anticoagulants/antiplatelets   METS/Exercise Tolerance: 4 - Raking leaves, gardening Comment: No intentional activity. Has been walking a lot for the bus, can walk a mile without PEREZ or CP    Hematologic:  - neg hematologic  ROS  (-) history of blood clots and history of blood transfusion   Musculoskeletal:  - neg musculoskeletal ROS     GI/Hepatic:  - neg GI/hepatic ROS  (-) GERD   Renal/Genitourinary:  - neg Renal ROS     Endo:  - neg endo ROS  (-) chronic steroid usage   Psychiatric/Substance Use:     (+) psychiatric history depression       Infectious Disease:  - neg infectious disease ROS     Malignancy:       Other:              Physical Exam  Airway  Mallampati: II  TM distance: >3 FB  Neck ROM: full    Cardiovascular - normal exam  Rhythm: regular  Rate: normal rate   Comments: H/o PSVT s/p ablation in 2020  Dental   (+) Minor Abnormalities - some fillings, tiny chips      Pulmonary - normal examBreath sounds clear to auscultation        Neurological - normal exam  She appears awake, alert and oriented x3.    Other Findings       OUTSIDE LABS:  CBC:   Lab Results   Component Value Date    WBC 8.8 05/02/2025    WBC 12.7 (H) 12/25/2021    HGB 14.1 05/02/2025    HGB 14.1 12/25/2021    HCT 42.9 05/02/2025    HCT 42.1 12/25/2021     05/02/2025     (H) 12/25/2021     BMP:   Lab Results   Component Value Date     05/02/2025     08/12/2024    POTASSIUM 3.7 05/02/2025    POTASSIUM 3.8 08/12/2024    CHLORIDE 102 05/02/2025    CHLORIDE 105 08/12/2024    CO2 24 05/02/2025    CO2 21 (L) 08/12/2024    BUN 19.7 05/02/2025    BUN 14.5 08/12/2024    CR 0.79 05/02/2025    CR 0.74 08/12/2024    GLC 98 05/02/2025     (H) 08/12/2024     COAGS:   Lab Results   Component Value Date    PTT 30 05/02/2025    INR 1.03 05/02/2025     POC: No results found for: \"BGM\", \"HCG\", \"HCGS\"  HEPATIC:   Lab Results   Component Value Date    ALBUMIN 4.3 " 08/12/2024    PROTTOTAL 6.9 08/12/2024    ALT 40 08/12/2024    AST 39 08/12/2024    ALKPHOS 135 08/12/2024    BILITOTAL 0.4 08/12/2024     OTHER:   Lab Results   Component Value Date    TROY 9.5 05/02/2025    LIPASE 74 12/25/2021    AMYLASE 53 12/13/2008    TSH 0.83 08/12/2024       Anesthesia Plan    ASA Status:  2      NPO Status: NPO Appropriate   Anesthesia Type: MAC.   Induction: intravenous.        Consents    Anesthesia Plan(s) and associated risks, benefits, and realistic alternatives discussed. Questions answered and patient/representative(s) expressed understanding.     - Discussed:     - Discussed with:  Patient            Postoperative Care            Comments:                   Kwabena Pickens MD    I have reviewed the pertinent notes and labs in the chart from the past 30 days and (re)examined the patient.  Any updates or changes from those notes are reflected in this note.    Clinically Significant Risk Factors Present on Admission

## 2025-05-13 ENCOUNTER — APPOINTMENT (OUTPATIENT)
Dept: CT IMAGING | Facility: CLINIC | Age: 65
DRG: 027 | End: 2025-05-13
Payer: COMMERCIAL

## 2025-05-13 ENCOUNTER — HOSPITAL ENCOUNTER (INPATIENT)
Facility: CLINIC | Age: 65
LOS: 1 days | Discharge: HOME | DRG: 027 | End: 2025-05-14
Attending: NEUROLOGICAL SURGERY | Admitting: NEUROLOGICAL SURGERY
Payer: COMMERCIAL

## 2025-05-13 ENCOUNTER — APPOINTMENT (OUTPATIENT)
Dept: GENERAL RADIOLOGY | Facility: CLINIC | Age: 65
DRG: 027 | End: 2025-05-13
Payer: COMMERCIAL

## 2025-05-13 ENCOUNTER — APPOINTMENT (OUTPATIENT)
Dept: GENERAL RADIOLOGY | Facility: CLINIC | Age: 65
DRG: 027 | End: 2025-05-13
Attending: NEUROLOGICAL SURGERY
Payer: COMMERCIAL

## 2025-05-13 ENCOUNTER — APPOINTMENT (OUTPATIENT)
Dept: CT IMAGING | Facility: CLINIC | Age: 65
DRG: 027 | End: 2025-05-13
Attending: NEUROLOGICAL SURGERY
Payer: COMMERCIAL

## 2025-05-13 ENCOUNTER — ANESTHESIA (OUTPATIENT)
Dept: SURGERY | Facility: CLINIC | Age: 65
DRG: 027 | End: 2025-05-13
Payer: COMMERCIAL

## 2025-05-13 DIAGNOSIS — G25.0 ESSENTIAL TREMOR: Primary | ICD-10-CM

## 2025-05-13 DIAGNOSIS — Z96.89 S/P DEEP BRAIN STIMULATOR PLACEMENT: ICD-10-CM

## 2025-05-13 LAB — GLUCOSE BLDC GLUCOMTR-MCNC: 119 MG/DL (ref 70–99)

## 2025-05-13 PROCEDURE — 710N000010 HC RECOVERY PHASE 1, LEVEL 2, PER MIN: Performed by: NEUROLOGICAL SURGERY

## 2025-05-13 PROCEDURE — 999N000141 HC STATISTIC PRE-PROCEDURE NURSING ASSESSMENT: Performed by: NEUROLOGICAL SURGERY

## 2025-05-13 PROCEDURE — 250N000013 HC RX MED GY IP 250 OP 250 PS 637: Performed by: NURSE PRACTITIONER

## 2025-05-13 PROCEDURE — 70450 CT HEAD/BRAIN W/O DYE: CPT | Mod: 26 | Performed by: RADIOLOGY

## 2025-05-13 PROCEDURE — C1883 ADAPT/EXT, PACING/NEURO LEAD: HCPCS | Performed by: NEUROLOGICAL SURGERY

## 2025-05-13 PROCEDURE — 250N000011 HC RX IP 250 OP 636: Mod: JZ | Performed by: NURSE PRACTITIONER

## 2025-05-13 PROCEDURE — 258N000003 HC RX IP 258 OP 636

## 2025-05-13 PROCEDURE — 999N000179 XR SURGERY CARM FLUORO LESS THAN 5 MIN W STILLS

## 2025-05-13 PROCEDURE — C1787 PATIENT PROGR, NEUROSTIM: HCPCS | Performed by: NEUROLOGICAL SURGERY

## 2025-05-13 PROCEDURE — 250N000009 HC RX 250

## 2025-05-13 PROCEDURE — 61867 IMPLANT NEUROELECTRODE: CPT | Mod: RT | Performed by: NEUROLOGICAL SURGERY

## 2025-05-13 PROCEDURE — 360N000086 HC SURGERY LEVEL 6 W/ FLUORO, PER MIN: Performed by: NEUROLOGICAL SURGERY

## 2025-05-13 PROCEDURE — 70450 CT HEAD/BRAIN W/O DYE: CPT | Mod: 77

## 2025-05-13 PROCEDURE — 272N000001 HC OR GENERAL SUPPLY STERILE: Performed by: NEUROLOGICAL SURGERY

## 2025-05-13 PROCEDURE — 120N000002 HC R&B MED SURG/OB UMMC

## 2025-05-13 PROCEDURE — 8E09XBF COMPUTER ASSISTED PROCEDURE OF HEAD AND NECK REGION, WITH FLUOROSCOPY: ICD-10-PCS | Performed by: NEUROLOGICAL SURGERY

## 2025-05-13 PROCEDURE — 250N000011 HC RX IP 250 OP 636: Performed by: NEUROLOGICAL SURGERY

## 2025-05-13 PROCEDURE — 70250 X-RAY EXAM OF SKULL: CPT | Mod: 26 | Performed by: RADIOLOGY

## 2025-05-13 PROCEDURE — 370N000017 HC ANESTHESIA TECHNICAL FEE, PER MIN: Performed by: NEUROLOGICAL SURGERY

## 2025-05-13 PROCEDURE — 250N000013 HC RX MED GY IP 250 OP 250 PS 637: Performed by: NEUROLOGICAL SURGERY

## 2025-05-13 PROCEDURE — 00K83ZZ MAP BASAL GANGLIA, PERCUTANEOUS APPROACH: ICD-10-PCS | Performed by: NEUROLOGICAL SURGERY

## 2025-05-13 PROCEDURE — 999N000065 XR SKULL PORT 1/3 VIEWS

## 2025-05-13 PROCEDURE — 70450 CT HEAD/BRAIN W/O DYE: CPT

## 2025-05-13 PROCEDURE — 250N000011 HC RX IP 250 OP 636: Mod: JZ

## 2025-05-13 PROCEDURE — 272N000004 HC RX 272: Performed by: NEUROLOGICAL SURGERY

## 2025-05-13 PROCEDURE — 00H03MZ INSERTION OF NEUROSTIMULATOR LEAD INTO BRAIN, PERCUTANEOUS APPROACH: ICD-10-PCS | Performed by: NEUROLOGICAL SURGERY

## 2025-05-13 PROCEDURE — 250N000011 HC RX IP 250 OP 636

## 2025-05-13 PROCEDURE — 4A10X4G MONITORING OF CENTRAL NERVOUS ELECTRICAL ACTIVITY, INTRAOPERATIVE, EXTERNAL APPROACH: ICD-10-PCS | Performed by: NEUROLOGICAL SURGERY

## 2025-05-13 PROCEDURE — 258N000003 HC RX IP 258 OP 636: Performed by: NURSE PRACTITIONER

## 2025-05-13 PROCEDURE — 250N000013 HC RX MED GY IP 250 OP 250 PS 637

## 2025-05-13 PROCEDURE — 250N000009 HC RX 250: Performed by: NEUROLOGICAL SURGERY

## 2025-05-13 DEVICE — IMPLANTABLE DEVICE: Type: IMPLANTABLE DEVICE | Site: BRAIN | Status: FUNCTIONAL

## 2025-05-13 RX ORDER — ONDANSETRON 4 MG/1
4 TABLET, ORALLY DISINTEGRATING ORAL EVERY 6 HOURS PRN
Status: DISCONTINUED | OUTPATIENT
Start: 2025-05-13 | End: 2025-05-14 | Stop reason: HOSPADM

## 2025-05-13 RX ORDER — AMOXICILLIN 250 MG
1 CAPSULE ORAL 2 TIMES DAILY
Status: DISCONTINUED | OUTPATIENT
Start: 2025-05-13 | End: 2025-05-13

## 2025-05-13 RX ORDER — ONDANSETRON 4 MG/1
4 TABLET, ORALLY DISINTEGRATING ORAL EVERY 6 HOURS PRN
Status: DISCONTINUED | OUTPATIENT
Start: 2025-05-13 | End: 2025-05-13

## 2025-05-13 RX ORDER — SODIUM CHLORIDE, SODIUM LACTATE, POTASSIUM CHLORIDE, CALCIUM CHLORIDE 600; 310; 30; 20 MG/100ML; MG/100ML; MG/100ML; MG/100ML
INJECTION, SOLUTION INTRAVENOUS CONTINUOUS
Status: DISCONTINUED | OUTPATIENT
Start: 2025-05-13 | End: 2025-05-13 | Stop reason: HOSPADM

## 2025-05-13 RX ORDER — AMOXICILLIN 250 MG
1 CAPSULE ORAL 2 TIMES DAILY
Status: DISCONTINUED | OUTPATIENT
Start: 2025-05-13 | End: 2025-05-14 | Stop reason: HOSPADM

## 2025-05-13 RX ORDER — HYDRALAZINE HYDROCHLORIDE 20 MG/ML
10 INJECTION INTRAMUSCULAR; INTRAVENOUS EVERY 30 MIN PRN
Status: DISCONTINUED | OUTPATIENT
Start: 2025-05-13 | End: 2025-05-14

## 2025-05-13 RX ORDER — ACETAMINOPHEN 325 MG/1
975 TABLET ORAL EVERY 8 HOURS
Status: DISCONTINUED | OUTPATIENT
Start: 2025-05-13 | End: 2025-05-14 | Stop reason: HOSPADM

## 2025-05-13 RX ORDER — POLYETHYLENE GLYCOL 3350 17 G/17G
17 POWDER, FOR SOLUTION ORAL DAILY
Status: DISCONTINUED | OUTPATIENT
Start: 2025-05-14 | End: 2025-05-13

## 2025-05-13 RX ORDER — ONDANSETRON 4 MG/1
4 TABLET, ORALLY DISINTEGRATING ORAL EVERY 30 MIN PRN
Status: DISCONTINUED | OUTPATIENT
Start: 2025-05-13 | End: 2025-05-13 | Stop reason: HOSPADM

## 2025-05-13 RX ORDER — NALOXONE HYDROCHLORIDE 0.4 MG/ML
0.2 INJECTION, SOLUTION INTRAMUSCULAR; INTRAVENOUS; SUBCUTANEOUS
Status: DISCONTINUED | OUTPATIENT
Start: 2025-05-13 | End: 2025-05-14 | Stop reason: HOSPADM

## 2025-05-13 RX ORDER — ONDANSETRON 2 MG/ML
4 INJECTION INTRAMUSCULAR; INTRAVENOUS EVERY 6 HOURS PRN
Status: DISCONTINUED | OUTPATIENT
Start: 2025-05-13 | End: 2025-05-14

## 2025-05-13 RX ORDER — DEXAMETHASONE SODIUM PHOSPHATE 4 MG/ML
4 INJECTION, SOLUTION INTRA-ARTICULAR; INTRALESIONAL; INTRAMUSCULAR; INTRAVENOUS; SOFT TISSUE
Status: DISCONTINUED | OUTPATIENT
Start: 2025-05-13 | End: 2025-05-13 | Stop reason: HOSPADM

## 2025-05-13 RX ORDER — PROPOFOL 10 MG/ML
INJECTION, EMULSION INTRAVENOUS PRN
Status: DISCONTINUED | OUTPATIENT
Start: 2025-05-13 | End: 2025-05-13

## 2025-05-13 RX ORDER — FLUOXETINE HYDROCHLORIDE 40 MG/1
40 CAPSULE ORAL AT BEDTIME
Status: DISCONTINUED | OUTPATIENT
Start: 2025-05-13 | End: 2025-05-13

## 2025-05-13 RX ORDER — OXYCODONE HYDROCHLORIDE 5 MG/1
5 TABLET ORAL EVERY 4 HOURS PRN
Status: DISCONTINUED | OUTPATIENT
Start: 2025-05-13 | End: 2025-05-14 | Stop reason: HOSPADM

## 2025-05-13 RX ORDER — CEFAZOLIN SODIUM/WATER 2 G/20 ML
2 SYRINGE (ML) INTRAVENOUS SEE ADMIN INSTRUCTIONS
Status: DISCONTINUED | OUTPATIENT
Start: 2025-05-13 | End: 2025-05-13 | Stop reason: HOSPADM

## 2025-05-13 RX ORDER — ACETAMINOPHEN 325 MG/1
975 TABLET ORAL ONCE
Status: COMPLETED | OUTPATIENT
Start: 2025-05-13 | End: 2025-05-13

## 2025-05-13 RX ORDER — ONDANSETRON 2 MG/ML
4 INJECTION INTRAMUSCULAR; INTRAVENOUS EVERY 30 MIN PRN
Status: DISCONTINUED | OUTPATIENT
Start: 2025-05-13 | End: 2025-05-13 | Stop reason: HOSPADM

## 2025-05-13 RX ORDER — ONDANSETRON 2 MG/ML
4 INJECTION INTRAMUSCULAR; INTRAVENOUS EVERY 6 HOURS PRN
Status: DISCONTINUED | OUTPATIENT
Start: 2025-05-13 | End: 2025-05-13

## 2025-05-13 RX ORDER — ROSUVASTATIN CALCIUM 5 MG/1
5 TABLET, COATED ORAL AT BEDTIME
Status: DISCONTINUED | OUTPATIENT
Start: 2025-05-13 | End: 2025-05-14 | Stop reason: HOSPADM

## 2025-05-13 RX ORDER — LIDOCAINE 40 MG/G
CREAM TOPICAL
Status: DISCONTINUED | OUTPATIENT
Start: 2025-05-13 | End: 2025-05-13 | Stop reason: HOSPADM

## 2025-05-13 RX ORDER — NALOXONE HYDROCHLORIDE 0.4 MG/ML
0.1 INJECTION, SOLUTION INTRAMUSCULAR; INTRAVENOUS; SUBCUTANEOUS
Status: DISCONTINUED | OUTPATIENT
Start: 2025-05-13 | End: 2025-05-13 | Stop reason: HOSPADM

## 2025-05-13 RX ORDER — BISACODYL 10 MG
10 SUPPOSITORY, RECTAL RECTAL DAILY PRN
Status: DISCONTINUED | OUTPATIENT
Start: 2025-05-16 | End: 2025-05-14 | Stop reason: HOSPADM

## 2025-05-13 RX ORDER — POLYETHYLENE GLYCOL 3350 17 G/17G
17 POWDER, FOR SOLUTION ORAL DAILY
Status: DISCONTINUED | OUTPATIENT
Start: 2025-05-14 | End: 2025-05-14 | Stop reason: HOSPADM

## 2025-05-13 RX ORDER — CEFAZOLIN SODIUM/WATER 2 G/20 ML
2 SYRINGE (ML) INTRAVENOUS
Status: COMPLETED | OUTPATIENT
Start: 2025-05-13 | End: 2025-05-13

## 2025-05-13 RX ORDER — HYDROMORPHONE HCL IN WATER/PF 6 MG/30 ML
0.4 PATIENT CONTROLLED ANALGESIA SYRINGE INTRAVENOUS EVERY 5 MIN PRN
Status: DISCONTINUED | OUTPATIENT
Start: 2025-05-13 | End: 2025-05-13 | Stop reason: HOSPADM

## 2025-05-13 RX ORDER — HYDROMORPHONE HCL IN WATER/PF 6 MG/30 ML
0.2 PATIENT CONTROLLED ANALGESIA SYRINGE INTRAVENOUS EVERY 5 MIN PRN
Status: DISCONTINUED | OUTPATIENT
Start: 2025-05-13 | End: 2025-05-13 | Stop reason: HOSPADM

## 2025-05-13 RX ORDER — LABETALOL HYDROCHLORIDE 5 MG/ML
10 INJECTION, SOLUTION INTRAVENOUS EVERY 10 MIN PRN
Status: DISCONTINUED | OUTPATIENT
Start: 2025-05-13 | End: 2025-05-14

## 2025-05-13 RX ORDER — LIDOCAINE HYDROCHLORIDE AND EPINEPHRINE 10; 10 MG/ML; UG/ML
INJECTION, SOLUTION INFILTRATION; PERINEURAL PRN
Status: DISCONTINUED | OUTPATIENT
Start: 2025-05-13 | End: 2025-05-13 | Stop reason: HOSPADM

## 2025-05-13 RX ORDER — PROCHLORPERAZINE MALEATE 5 MG/1
5 TABLET ORAL EVERY 6 HOURS PRN
Status: DISCONTINUED | OUTPATIENT
Start: 2025-05-13 | End: 2025-05-13

## 2025-05-13 RX ORDER — SODIUM CHLORIDE 9 MG/ML
INJECTION, SOLUTION INTRAVENOUS CONTINUOUS
Status: DISCONTINUED | OUTPATIENT
Start: 2025-05-13 | End: 2025-05-14

## 2025-05-13 RX ORDER — FENTANYL CITRATE 50 UG/ML
25 INJECTION, SOLUTION INTRAMUSCULAR; INTRAVENOUS EVERY 5 MIN PRN
Status: DISCONTINUED | OUTPATIENT
Start: 2025-05-13 | End: 2025-05-13 | Stop reason: HOSPADM

## 2025-05-13 RX ORDER — NALOXONE HYDROCHLORIDE 0.4 MG/ML
0.4 INJECTION, SOLUTION INTRAMUSCULAR; INTRAVENOUS; SUBCUTANEOUS
Status: DISCONTINUED | OUTPATIENT
Start: 2025-05-13 | End: 2025-05-14 | Stop reason: HOSPADM

## 2025-05-13 RX ORDER — LABETALOL HYDROCHLORIDE 5 MG/ML
10 INJECTION, SOLUTION INTRAVENOUS
Status: DISCONTINUED | OUTPATIENT
Start: 2025-05-13 | End: 2025-05-13 | Stop reason: HOSPADM

## 2025-05-13 RX ORDER — PROCHLORPERAZINE MALEATE 5 MG/1
5 TABLET ORAL EVERY 6 HOURS PRN
Status: DISCONTINUED | OUTPATIENT
Start: 2025-05-13 | End: 2025-05-14 | Stop reason: HOSPADM

## 2025-05-13 RX ORDER — METHYLPHENIDATE HYDROCHLORIDE 5 MG/1
15 TABLET ORAL 2 TIMES DAILY
Status: DISCONTINUED | OUTPATIENT
Start: 2025-05-14 | End: 2025-05-14 | Stop reason: HOSPADM

## 2025-05-13 RX ORDER — ONDANSETRON 2 MG/ML
INJECTION INTRAMUSCULAR; INTRAVENOUS PRN
Status: DISCONTINUED | OUTPATIENT
Start: 2025-05-13 | End: 2025-05-13

## 2025-05-13 RX ORDER — FENTANYL CITRATE 50 UG/ML
50 INJECTION, SOLUTION INTRAMUSCULAR; INTRAVENOUS EVERY 5 MIN PRN
Status: DISCONTINUED | OUTPATIENT
Start: 2025-05-13 | End: 2025-05-13 | Stop reason: HOSPADM

## 2025-05-13 RX ORDER — OXYCODONE HYDROCHLORIDE 10 MG/1
10 TABLET ORAL EVERY 4 HOURS PRN
Status: DISCONTINUED | OUTPATIENT
Start: 2025-05-13 | End: 2025-05-14 | Stop reason: HOSPADM

## 2025-05-13 RX ORDER — CLINDAMYCIN PHOSPHATE 900 MG/50ML
900 INJECTION, SOLUTION INTRAVENOUS EVERY 8 HOURS
Status: DISCONTINUED | OUTPATIENT
Start: 2025-05-13 | End: 2025-05-14

## 2025-05-13 RX ORDER — LIDOCAINE 40 MG/G
CREAM TOPICAL
Status: DISCONTINUED | OUTPATIENT
Start: 2025-05-13 | End: 2025-05-14 | Stop reason: HOSPADM

## 2025-05-13 RX ORDER — BISACODYL 10 MG
10 SUPPOSITORY, RECTAL RECTAL DAILY PRN
Status: DISCONTINUED | OUTPATIENT
Start: 2025-05-16 | End: 2025-05-13

## 2025-05-13 RX ADMIN — HYDROMORPHONE HYDROCHLORIDE 0.2 MG: 0.2 INJECTION, SOLUTION INTRAMUSCULAR; INTRAVENOUS; SUBCUTANEOUS at 17:07

## 2025-05-13 RX ADMIN — SENNOSIDES AND DOCUSATE SODIUM 1 TABLET: 50; 8.6 TABLET ORAL at 21:04

## 2025-05-13 RX ADMIN — ACETAMINOPHEN 975 MG: 325 TABLET ORAL at 06:57

## 2025-05-13 RX ADMIN — PHENYLEPHRINE HYDROCHLORIDE 50 MCG: 10 INJECTION INTRAVENOUS at 09:54

## 2025-05-13 RX ADMIN — PROPOFOL 50 MG: 10 INJECTION, EMULSION INTRAVENOUS at 08:23

## 2025-05-13 RX ADMIN — PHENYLEPHRINE HYDROCHLORIDE 50 MCG: 10 INJECTION INTRAVENOUS at 09:45

## 2025-05-13 RX ADMIN — PROPOFOL 20 MG: 10 INJECTION, EMULSION INTRAVENOUS at 08:28

## 2025-05-13 RX ADMIN — PROPOFOL 30 MG: 10 INJECTION, EMULSION INTRAVENOUS at 08:34

## 2025-05-13 RX ADMIN — SODIUM CHLORIDE, SODIUM LACTATE, POTASSIUM CHLORIDE, AND CALCIUM CHLORIDE: .6; .31; .03; .02 INJECTION, SOLUTION INTRAVENOUS at 08:02

## 2025-05-13 RX ADMIN — PHENYLEPHRINE HYDROCHLORIDE 50 MCG: 10 INJECTION INTRAVENOUS at 10:30

## 2025-05-13 RX ADMIN — ONDANSETRON 4 MG: 2 INJECTION, SOLUTION INTRAMUSCULAR; INTRAVENOUS at 13:08

## 2025-05-13 RX ADMIN — ROSUVASTATIN CALCIUM 5 MG: 5 TABLET, FILM COATED ORAL at 21:04

## 2025-05-13 RX ADMIN — OXYCODONE HYDROCHLORIDE 5 MG: 5 TABLET ORAL at 21:05

## 2025-05-13 RX ADMIN — DEXMEDETOMIDINE HYDROCHLORIDE 12 MCG: 100 INJECTION, SOLUTION INTRAVENOUS at 09:05

## 2025-05-13 RX ADMIN — PHENYLEPHRINE HYDROCHLORIDE 50 MCG: 10 INJECTION INTRAVENOUS at 10:15

## 2025-05-13 RX ADMIN — Medication 2 G: at 09:40

## 2025-05-13 RX ADMIN — SODIUM CHLORIDE, SODIUM LACTATE, POTASSIUM CHLORIDE, AND CALCIUM CHLORIDE: .6; .31; .03; .02 INJECTION, SOLUTION INTRAVENOUS at 11:15

## 2025-05-13 RX ADMIN — FENTANYL CITRATE 50 MCG: 50 INJECTION, SOLUTION INTRAMUSCULAR; INTRAVENOUS at 15:40

## 2025-05-13 RX ADMIN — CLINDAMYCIN IN 5 PERCENT DEXTROSE 900 MG: 18 INJECTION, SOLUTION INTRAVENOUS at 21:09

## 2025-05-13 RX ADMIN — FENTANYL CITRATE 50 MCG: 50 INJECTION, SOLUTION INTRAMUSCULAR; INTRAVENOUS at 14:17

## 2025-05-13 RX ADMIN — HYDROMORPHONE HYDROCHLORIDE 0.2 MG: 0.2 INJECTION, SOLUTION INTRAMUSCULAR; INTRAVENOUS; SUBCUTANEOUS at 16:19

## 2025-05-13 RX ADMIN — ACETAMINOPHEN 975 MG: 325 TABLET ORAL at 17:41

## 2025-05-13 RX ADMIN — ONDANSETRON 4 MG: 2 INJECTION, SOLUTION INTRAMUSCULAR; INTRAVENOUS at 14:15

## 2025-05-13 RX ADMIN — PROPOFOL 30 MG: 10 INJECTION, EMULSION INTRAVENOUS at 08:26

## 2025-05-13 RX ADMIN — SODIUM CHLORIDE: 0.9 INJECTION, SOLUTION INTRAVENOUS at 14:49

## 2025-05-13 RX ADMIN — DEXMEDETOMIDINE HYDROCHLORIDE 0.7 MCG/KG/HR: 100 INJECTION, SOLUTION INTRAVENOUS at 09:05

## 2025-05-13 RX ADMIN — FLUOXETINE HYDROCHLORIDE 60 MG: 40 CAPSULE ORAL at 21:04

## 2025-05-13 RX ADMIN — Medication 2 G: at 13:40

## 2025-05-13 RX ADMIN — DEXMEDETOMIDINE HYDROCHLORIDE 12 MCG: 100 INJECTION, SOLUTION INTRAVENOUS at 13:01

## 2025-05-13 ASSESSMENT — ACTIVITIES OF DAILY LIVING (ADL)
ADLS_ACUITY_SCORE: 15
ADLS_ACUITY_SCORE: 23
ADLS_ACUITY_SCORE: 15
ADLS_ACUITY_SCORE: 23
ADLS_ACUITY_SCORE: 15

## 2025-05-13 NOTE — PROGRESS NOTES
Date: 05/13/25    DBS Intra-operative Note Neurology- Dr. Alee Garcia    Diagnosis: Essential Tremor   Brain Side: right  Brain Target(s): VIM    Surgeon: Dr. Hermann MCGHEE, Neurology, Macrostimulation, Threshold Check, Neurological Examination: Laura Garcia, Fareed Leigh, Jessika Matias and Ms. Alecia Haynes     The indication for surgery is: Essential Tremor   Complications encountered: none    OR procedure and clinical course of the patient:    The patient has been thoroughly evaluated by an interdisciplinary process and deemed to be a DBS candidate.  Typically we have neurology, neurosurgery, neuropsychology, psychiatry and sometimes rehabilitation services see the patient pre-operatively.  This evaluation is followed by an interdisciplinary team meeting where we discuss risks and benefits, brain targets, unilateral versus bilateral approaches, realistic benefits and outcome measures.  The patient relevant patient-oriented material on DBS risks and benefits and increased risks are relayed to the patient and family before the operation.    The risks and benefits of a DBS operation have been explained to the patient and also realistic benefits have been discussed in person and in the interdisciplinary team.    The following DBS candidate information has been thoroughly reviewed prior to the operation in the clinic setting-    This patient is at the present time considered  a potential candidate for deep brain stimulation (DBS).   We can perform DBS only following an appropriate patient screening, which is performed by an experienced interdisciplinary DBS team.  The discussion includes 1- the patients reasons for having DBS (symptom specific), 2- whether the risks are worth the benefits for that specific patient- given their pre-operative expectations, 3- unilateral versus bilateral DBS, removal of old DBS/batteries, or extra  rescue leads  in patients with existing DBS leads,  4- staged unilateral DBS  versus bilateral DBS weighing safety and need for two leads as the primary determinants of the decision, 5- DBS versus lesion therapy, and 5- type of DBS battery.  Following the interdisciplinary team meeting, potential candidates are usually contacted by phone to apprise them of risks, benefits, and whether they are ultimately  a candidate.   Prior to the interdisciplinary team meeting, the patient must have appointments with neurology, neurosurgery, neuropsychology, and psychiatry and additionally, if they have a diagnosis of Parkinson s disease they must have an on-off levodopa challenge test (performed using the UPDRS Parkinson s disease rating scale).  Select patients may need to meet with a physical therapist, occupational therapist, speech therapist, , and/or financial counselor.  Some patients may be deemed an immediate DBS candidate, some a future DBS candidate (e.g. may not be severe enough, or they may need follow-up testing to check disease progression in an area such as cognition), and some may be deemed not an appropriate candidate.  There may be other therapies or clinical trials that patients who are not candidates may potentially receive.    The Following Risks and Benefits of DBS have been throughly reviewed prior to the operation in the clinic setting-    The risk-benefit ratio was discussed with the patient and any family members present.  We discussed the importance of identifying the primary reasons/symptoms that are driving their decision to have DBS.  Each of the individual reasons was then matched with an expectation(s) for success.  Success may vary from diagnosis to diagnosis, and from symptom to symptom.  Parkinson s disease patient usually only improve in symptoms that are levodopa responsive, or alternatively they may improve in on-off fluctuations, tremor,  on  time, and in amount of dyskinesia.  They will not improve in levodopa unresponsive symptoms, and DBS will not  stop disease progression.  Improvement may have a wide range in individual patients, and outcome is usually not perfect.  There may be decrements in specific areas and most particularly when we see decrements they are usually in in walking, talking and speaking.  These decrements may occur with a single lead and may be more pronounced with two leads.  These general rules hold for most DBS indications (with the exception of the levodopa responsiveness rule in Parkinson s which may not apply to other disorders).  Some symptoms and diagnoses such as secondary dystonia, Tourette syndrome, epilepsy, cluster headache, and OCD may have a wide response range and generalizations are hard to make in these conditions.  DBS cases can have associated risks and these risks are higher in older age (particularly over 70), with brain atrophy, and in patients on blood thinners or in patients who have dementia and/or uncontrolled psychiatric disorders.  We have explained the risks and benefits to the patients and to any family members present, and they have had time to ask any questions or to ask for any clarifications.   We have reviewed the risks of lead insertion, and microelectrode recording insertion as well as the risks of infection just from these procedures.  Other overall risks include hemorrhage, infection, stroke, seizure, hydrocephalus, weakness, numbness, changes in vision, changes in speech (softening, problems getting words out of the mouth, slurring), worsened general cognition/mood, suicide, worsening co-morbidities (e.g. cardiac, lung, sleep, fatigue), anxiety, depression, fatigue, and death.  The devices can break, they can move, and they can require movement if suboptimally placed--or alternatively if they migrate.  Each DBS lead inserted increases the overall risk especially in emergences of walking, talking and thinking problems (higher risk for example with two leads compared to one).  We have explained that  expectations for walking, talking, balance, handwriting, and perfect tremor control are usually not obtained by the use of DBS therapy, and that DBS is not a cure.  Additionally we have explained that DBS may have both stimulation induced effects (could be reversible effects by simply changing settings), and also lesion effects (may not be reversible, and may be related to inserting the DBS lead and the recording lead(s)).  The patient understood the risks and benefits and wanted to proceed.    Following the candidate discussion and the realistic discussion of risks and benefits as well as expectations-- the patient was prepared for a DBS surgery.    A stereotactic CT scan was fused to a MRI.  Direct and indirect targeting were performed.    Side 1:  The functional target was:  Lateral (X): +12.01  AP (Y) : -6.48  Axial (Z): +0.07  AP angle: 55.0  MSP Angle: 18.5    Microelectrode Recording Summary:    Number of recording passes: one (channels used on the Jose G Gun for simultaneous passes)    Center: TAZ detected moderately robust thalamic activity. Somatosensory testing was diminutive throughout. Microstim through the electrode tip showed shoulder and hip at 6.4 mm above target, +/- background tongue activity at 4.0 mm above target and deep touch at 0.8 mm above target. No light touch/VC detected.  The approximate ventral border was 0.2 mm above. No paresthesias with micro stimulation between -1.2 mm and 3.0 mm above target. With ring stmulation, patient reported a pressure in the mouth/front teeth at 2.0 mA at 3.0 mm above target at the projected depth corresponding to the 2 middle contacts on the electrode lead.    Posterior: TAZ detected robust thalamic activity throughout. Somatosensory testing was diminutive throughout in the deep bicep at 4.5 mm above, +/- tongue protrusion at 4 mm above, and shoulder/elbow and tongue retraction at 3.5 mm above. No clear tactile or VC detected. The approximate ventral border  was 2.0 mm above. Microstim through the electrode tip showed high paresthesia thresholds related to the hand and fingers (60- 100 uA). Ring stimulation resulsted in a pressure feeling in the face at 1.0 mA at 3.0 mm above.     Of note, prior to lead placement, clinical testing showed lesion effect as observed with FTN, spiral testing and placing a cup to mouth.     SEP's checked (Y/N): Y  VEP's checked (Y/N): N    Final depth DBS Lead: 0 mm  Final lead placement: center    [employer] system  Once the recording was performed then the lead contacts were checked for thresholds for benefit and side effect:    Thresholds checked in monopolar configuration at PW of 60 and Frequency of 130:  Contact 0: 3.6 v / 4.2 mA patient experienced slight ataxia and transient paresthesias starting at 0.5 v and still transient at 4.0 v with face paresthesia between 0.5-0.8 v and upper extremity paresthesia at 0.5 to 4 v. Benefit tested at 1.0 v - good tremor control.   Contact 1: 5.0 v / 6.7 mA  - patient experienced capsule involving the lip and transient paresthesia in the hand from 3.4- 5.0 v and paresthesias in the cheek at 3.4 v. Benefit tested at 1.0 v - good tremor control.   Contact 2: 5.5 / 6.6 mA v  - patient experienced capsule involving the lip and transient paresthesia in the lip . Benefit tested at 1.0 v - good tremor control.  Contact 3: 7.0 v / 7.4 mA - patient experienced capsule involving the mouth and patient reported a feeling in the face but unsure if this was paresthesia or capsule . Benefit tested at 1.0 v - good tremor control.    Expect best contact to be between contacts 1 and 2.      This was true intraoperative programming and the benefits and side effects will be used in the outpatient to further guide the programming in the clinic.      We checked the lead placement by intraoperative fluoroscopy.    For immediate postoperative DBS programming only:  New lead reconnected to previously implanted pulse  generator (Yes/No/NA) - NA    Immediate post-operative programming (Yes/No/NA) - No   Total programming time: NA    New program was as follows - NA    Any Optional Comments:  This patient has been screened for depression and has no active uncontrolled depression as confirmed prior to OR by our interdisciplinary team, and in the OR prior to this procedure following standard of care.    INTRAOPERATIVE NEUROPHYSIOLOGIC MAPPING & ANALYSIS    (UNITS) - CPT - Description    Physician attendance start time: 8 am  ICD-10 diagnosis Code: G25.1 Tremor, Benign Essential     Physician attendance stop time:1:30 pm  Modifier:     ________________________________________________________________  G20     Parkinson s Disease      G21.11  Parkinsonism, secondary     G90.3    Parkinsonism with hypotension    G24.1  Dystonia       G27.8  Dystonia, symptomatic torsion  G25.1  Tremor, Benign Essential  G25.3  Myoclonus  G25.69 Tics of Organic Origin  F95.2    Tourette s Syndrome  F95.9  Tic, not classified  F42  Obsessive Compulsive Disorder (OCD)

## 2025-05-13 NOTE — ANESTHESIA CARE TRANSFER NOTE
Patient: Aida DAY Trevin    Procedure: Procedure(s):  Stealth Right side deep brain stimulator placement, phase I, placement of right side deep brain stimulator electrode, target right ventral intermediate nucleus of the thalamus, with microelectrode recording       Diagnosis: Essential tremor [G25.0]  Diagnosis Additional Information: No value filed.    Anesthesia Type:   MAC     Note:    Oropharynx: oropharynx clear of all foreign objects and spontaneously breathing  Level of Consciousness: awake  Oxygen Supplementation: face mask  Level of Supplemental Oxygen (L/min / FiO2): 6  Independent Airway: airway patency satisfactory and stable  Dentition: dentition unchanged  Vital Signs Stable: post-procedure vital signs reviewed and stable  Report to RN Given: handoff report given  Patient transferred to: PACU    Handoff Report: Identifed the Patient, Identified the Reponsible Provider, Reviewed the pertinent medical history, Discussed the surgical course, Reviewed Intra-OP anesthesia mangement and issues during anesthesia, Set expectations for post-procedure period and Allowed opportunity for questions and acknowledgement of understanding      Vitals:  Vitals Value Taken Time   BP     Temp     Pulse 57 05/13/25 14:08   Resp 18 05/13/25 14:08   SpO2 97 % 05/13/25 14:08   Vitals shown include unfiled device data.    Electronically Signed By: Kwabena Pickens MD  May 13, 2025  2:08 PM

## 2025-05-13 NOTE — BRIEF OP NOTE
[facility]    Brief Operative Note    Pre-operative diagnosis: Essential tremor [G25.0]  Post-operative diagnosis Same as pre-operative diagnosis    Procedure: Stealth Right side deep brain stimulator placement, phase I, placement of right side deep brain stimulator electrode, target right ventral intermediate nucleus of the thalamus, with microelectrode recording, Right - Head    Surgeon: Surgeons and Role:     * Brynn Schumacher MD - Primary  Anesthesia: MAC with Local   Estimated Blood Loss: 5 mL    Drains: None  Specimens: * No specimens in log *  Findings:   Final electrode location, center Jose G Gun, at target depth.  All impedance values were within normal.  Complications: None.  Implants:   Implant Name Type Inv. Item Serial No.  Lot No. [id number] No. Used Action   [employer] [employer] Cover Other   [employer] [id number] Right 1 Implanted   [employer] Directional Lead Kit   [id number] [employer] NA Right 1 Implanted       BRYNN SCHUMACHER MD, PHD

## 2025-05-13 NOTE — ANESTHESIA POSTPROCEDURE EVALUATION
Patient: Aida Zhong    Procedure: Procedure(s):  Stealth Right side deep brain stimulator placement, phase I, placement of right side deep brain stimulator electrode, target right ventral intermediate nucleus of the thalamus, with microelectrode recording       Anesthesia Type:  MAC    Note:  Disposition: Inpatient   Postop Pain Control: Uneventful            Sign Out: Well controlled pain   PONV: No   Neuro/Psych: Uneventful            Sign Out: Acceptable/Baseline neuro status   Airway/Respiratory: Uneventful            Sign Out: Acceptable/Baseline resp. status   CV/Hemodynamics: Uneventful            Sign Out: Acceptable CV status; No obvious hypovolemia; No obvious fluid overload   Other NRE: NONE   DID A NON-ROUTINE EVENT OCCUR? No           Last vitals:  Vitals Value Taken Time   BP 94/61 05/13/25 14:30   Temp 36.6  C (97.8  F) 05/13/25 14:05   Pulse 55 05/13/25 14:33   Resp 10 05/13/25 14:33   SpO2 93 % 05/13/25 14:33   Vitals shown include unfiled device data.    Electronically Signed By: Michael Dean MD  May 13, 2025  2:33 PM

## 2025-05-14 VITALS
BODY MASS INDEX: 23.29 KG/M2 | DIASTOLIC BLOOD PRESSURE: 56 MMHG | TEMPERATURE: 98.1 F | HEIGHT: 68 IN | SYSTOLIC BLOOD PRESSURE: 110 MMHG | WEIGHT: 153.66 LBS | RESPIRATION RATE: 16 BRPM | HEART RATE: 72 BPM | OXYGEN SATURATION: 96 %

## 2025-05-14 LAB
ANION GAP SERPL CALCULATED.3IONS-SCNC: 8 MMOL/L (ref 7–15)
BUN SERPL-MCNC: 12.3 MG/DL (ref 8–23)
CALCIUM SERPL-MCNC: 8.2 MG/DL (ref 8.8–10.4)
CHLORIDE SERPL-SCNC: 106 MMOL/L (ref 98–107)
CREAT SERPL-MCNC: 0.87 MG/DL (ref 0.51–0.95)
EGFRCR SERPLBLD CKD-EPI 2021: 74 ML/MIN/1.73M2
ERYTHROCYTE [DISTWIDTH] IN BLOOD BY AUTOMATED COUNT: 12.8 % (ref 10–15)
GLUCOSE SERPL-MCNC: 113 MG/DL (ref 70–99)
HCO3 SERPL-SCNC: 25 MMOL/L (ref 22–29)
HCT VFR BLD AUTO: 37.3 % (ref 35–47)
HGB BLD-MCNC: 11.8 G/DL (ref 11.7–15.7)
MCH RBC QN AUTO: 29.1 PG (ref 26.5–33)
MCHC RBC AUTO-ENTMCNC: 31.6 G/DL (ref 31.5–36.5)
MCV RBC AUTO: 92 FL (ref 78–100)
PLATELET # BLD AUTO: 314 10E3/UL (ref 150–450)
POTASSIUM SERPL-SCNC: 3.5 MMOL/L (ref 3.4–5.3)
RBC # BLD AUTO: 4.05 10E6/UL (ref 3.8–5.2)
SODIUM SERPL-SCNC: 139 MMOL/L (ref 135–145)
WBC # BLD AUTO: 8.7 10E3/UL (ref 4–11)

## 2025-05-14 PROCEDURE — 250N000011 HC RX IP 250 OP 636: Mod: JZ | Performed by: NURSE PRACTITIONER

## 2025-05-14 PROCEDURE — 250N000013 HC RX MED GY IP 250 OP 250 PS 637: Performed by: NURSE PRACTITIONER

## 2025-05-14 PROCEDURE — 85041 AUTOMATED RBC COUNT: CPT | Performed by: NURSE PRACTITIONER

## 2025-05-14 PROCEDURE — 36415 COLL VENOUS BLD VENIPUNCTURE: CPT | Performed by: NURSE PRACTITIONER

## 2025-05-14 PROCEDURE — 80048 BASIC METABOLIC PNL TOTAL CA: CPT | Performed by: NURSE PRACTITIONER

## 2025-05-14 RX ORDER — OXYCODONE HYDROCHLORIDE 5 MG/1
5 TABLET ORAL EVERY 6 HOURS PRN
Qty: 12 TABLET | Refills: 0 | Status: SHIPPED | OUTPATIENT
Start: 2025-05-14

## 2025-05-14 RX ORDER — CLINDAMYCIN PHOSPHATE 900 MG/50ML
900 INJECTION, SOLUTION INTRAVENOUS EVERY 8 HOURS
Status: COMPLETED | OUTPATIENT
Start: 2025-05-14 | End: 2025-05-14

## 2025-05-14 RX ORDER — AMOXICILLIN 250 MG
1 CAPSULE ORAL 2 TIMES DAILY
Qty: 20 TABLET | Refills: 0 | Status: SHIPPED | OUTPATIENT
Start: 2025-05-14 | End: 2025-05-24

## 2025-05-14 RX ADMIN — SENNOSIDES AND DOCUSATE SODIUM 1 TABLET: 50; 8.6 TABLET ORAL at 08:00

## 2025-05-14 RX ADMIN — OXYCODONE HYDROCHLORIDE 10 MG: 10 TABLET ORAL at 12:06

## 2025-05-14 RX ADMIN — OXYCODONE HYDROCHLORIDE 5 MG: 5 TABLET ORAL at 01:47

## 2025-05-14 RX ADMIN — CLINDAMYCIN IN 5 PERCENT DEXTROSE 900 MG: 18 INJECTION, SOLUTION INTRAVENOUS at 12:01

## 2025-05-14 RX ADMIN — ACETAMINOPHEN 975 MG: 325 TABLET ORAL at 01:46

## 2025-05-14 RX ADMIN — METHYLPHENIDATE HYDROCHLORIDE 15 MG: 5 TABLET ORAL at 12:06

## 2025-05-14 RX ADMIN — POLYETHYLENE GLYCOL 3350 17 G: 17 POWDER, FOR SOLUTION ORAL at 08:00

## 2025-05-14 RX ADMIN — ACETAMINOPHEN 975 MG: 325 TABLET ORAL at 10:19

## 2025-05-14 RX ADMIN — METHYLPHENIDATE HYDROCHLORIDE 15 MG: 5 TABLET ORAL at 08:00

## 2025-05-14 RX ADMIN — OXYCODONE HYDROCHLORIDE 10 MG: 10 TABLET ORAL at 08:04

## 2025-05-14 RX ADMIN — CLINDAMYCIN IN 5 PERCENT DEXTROSE 900 MG: 18 INJECTION, SOLUTION INTRAVENOUS at 04:48

## 2025-05-14 ASSESSMENT — ACTIVITIES OF DAILY LIVING (ADL)
ADLS_ACUITY_SCORE: 31
ADLS_ACUITY_SCORE: 33
ADLS_ACUITY_SCORE: 31
ADLS_ACUITY_SCORE: 31
ADLS_ACUITY_SCORE: 23
DEPENDENT_IADLS:: INDEPENDENT
ADLS_ACUITY_SCORE: 31
ADLS_ACUITY_SCORE: 33
ADLS_ACUITY_SCORE: 31
ADLS_ACUITY_SCORE: 31
ADLS_ACUITY_SCORE: 23
ADLS_ACUITY_SCORE: 33
ADLS_ACUITY_SCORE: 31

## 2025-05-14 NOTE — PLAN OF CARE
Status: S/p R DBS placement phase 1. Hx of essential tremor, depression, PSVT, and non ischemic cardiomyopathy   Vitals: VSS on RA, intermittently dipping down to 88-91% being managed with incentive spirometry   Neuros: A&Ox4, strengths 5/5 throughout, denies N/T, BUE tremors with R greater than L.   IV: PIV infusing NS at 75 mL/hr   Labs/Electrolytes: WNL   Resp: Weaned off of NC this shift, occasionally dipping down to high 80's-low 90's. Encouraging incentive spirometry   Diet: Regular  GI: LBM 5/12. Has scheduled senna and miralax   : Voiding spontaneously in bathroom  Pain: Headache managed with PRN oxycodone, scheduled tylenol, and cold packs   Activity: SBA/IV pole  Plan/Updates this shift: SW consulted d/t SDOH questionnaire-pt stating this morning she does not feel she needs this consult and has access to and knowledge of proper resources in [facility]. Pt also wants to discharge this AM around 1100, last abx not due until 1300 so wondering if it can be changed to PO. Continue with POC.     Goal Outcome Evaluation:      Plan of Care Reviewed With: patient    Overall Patient Progress: improving    Outcome Evaluation: Pain managed on PO meds. Voids spontaneously. No BM since before surgery

## 2025-05-14 NOTE — PLAN OF CARE
Goal Outcome Evaluation:      Plan of Care Reviewed With: patient    Overall Patient Progress: improvingOverall Patient Progress: improving    Outcome Evaluation: Pain managed with PRN oxycodone, tolerating regular diet, voiding spontaneously    Arrived from: PACU  Belongings/meds: Phone, glasses, clothes, phone   2 RN Skin Assessment Completed by: Ridge SALGADO and Leeann CASTANEDA   Skin Findings: R head incision SOPHIE, pin site x4 with anterior 2 covered with primapore   Non-intact findings documented (yes/no/NA): Yes     Status: S/p R DBS placement phase 1. Hx of essential tremor, depression, PSVT, and non ischemic cardiomyopathy   Vitals: VSS   Neuros: A&Ox4, strengths 5/5 throughout, denies N/T, BUE tremors with R greater than L.   IV: PIV infusing NS at 75 mL/hr   Labs/Electrolytes: WNL   Resp: LSC on 1L NC when sleeping   Diet: Regular, tolerating well   GI: LBM PTA, BS+  : Voiding spontaneously   Pain: Headache managed with PRN oxycodone   Activity: SBA w/GB   Social: Family visited, supportive   Plan/Updates this shift: SW consulted d/t SDOH questionnaire, continue with POC.

## 2025-05-14 NOTE — OP NOTE
PATIENT NAME: AIDA ZHONG  YOB: 2025  MRN:   [id number]  ACCOUNT:  [id number]      DATE OF PROCEDURE:  05/13/2025    PREOPERATIVE DIAGNOSIS:  1.  Essential tremor  2.  Bilateral tremor, right worse than left    POSTOPERATIVE DIAGNOSIS:  1.  Essential tremor  2.  Bilateral tremor, right worse than left    PROCEDURES PERFORMED:   1.  Placement of CRW stereotactic headframe.   2.  Stereotactic neuronavigation planning and CT of head.   3.  Stereotactic neuronavigation using the FlockTAG system for surgical planning, targeting and approach. The target is right ventral intermediate nucleus of the thalamus (Vim).   4.  Right side deep brain stimulator placement, phase I, placement of right side deep brain stimulator electrode, target is right ventral intermediate nucleus of the thalamus (Vim).   5.  Use of intraoperative microelectrode recording.   6.  Use of intraoperative fluoroscopy.    ATTENDING SURGEON:  Hermann Schumacher MD, PhD     RESIDENT SURGEON:  None.  No residents were available.     ANESTHESIA:  Monitored anesthesia care and local anesthetic.     ESTIMATED BLOOD LOSS:  5 mL.     COMPLICATIONS:  None.    FINDINGS:  Final electrode location center Jose G Gun position, at target depth.  All impedance values were within normal.    IMPLANTS:  1.  Medtronic SenSight DBS electrode, REF [id number], S/N [id number], unmarked for right side.  2.  Medtronic SenSight jean hole cover, REF [id number], Lot# [id number]  3.  Medtronic DBS electrode protective cover    INDICATIONS FOR PROCEDRUE:  Ms. Aida Zhong is a 65 year old left handed female with a diagnosis of essential tremor.  Patient states that her tremor began when she was 13 years old.  She was at [employer] and people noted her tremor when she was lighting a candle.  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  She was ultimately diagnosed with essential tremor in 2014.  Her tremor has progressed and it is now interfering  "with drinking and eating.  She has seen Dr. Moon who is recommending DBS workup.  Patient states that she is very familiar with DBS and she has been \"following\" the therapy and its development for quite some time.  Her goal for DBS is to better control her tremor, to be able to eat without spilling and to put makeup on without poking herself in the eye.  She also would like to be able to play with her grandchildren without having tremor.  Her tremor is bilateral and she would like her left side treated first.  She would like a rechargeable generator/battery and her first choice is Medtronic, followed by [employer] being the second choice.  We discussed the potential outcome of the DBS candidacy evaluation.  Upon completion of the evaluation, patient's case will be dicussed at the Movement Disorder Consensus Group Meeting.  Patient may not be considered to be a good candidate.  If that is the case, the group will provide a reason for our recommendation against DBS.  Patient may also be considered to be a good candidate and wait and see strategy may be recommended.  Patient may also be considered to be a good candidate and staged bilateral strategy may be recommended.  We discussed both phase I and II of DBS surgery.  We discussed that during phase I, we would place the DBS electrode on the right side under MAC and microelectrode recording.  She would then return one week later for the phase II with placement of the DBS generator/battery over the right chest wall under general anesthesia.  If she is a unilateral candidate or wait and see strategy candidate, then she will undergo another evaluation/discussion prior to proceeding to the left side implantation.  If she is a bilateral candidate in a staged fashion, she would return three weeks later for the phase I and II combined for the placement of the DBS electrode on the left side under MAC and microelectrode recording followed by connection to the " previously implanted generator/battery.  Risks, benefits, alternative therapies were discussed with the patient, including but not limited to infection and bleeding (intracranial), injury to the brain, stroke, seizure, death, hardware failure and possible need for more surgeries.  Surgical procedure was discussed in detail.  All questions were answered, and she expressed understanding.  Her case was discussed at the Movement Disorder Consensus Group Meeting and she was considered to be a good candidate for DBS surgery.  Recommendation was staged bilateral protocol, right side implantation first, target right then left Vim, and Medtronic device with rechargeable generator/battery over the right chest wall.    DESCRIPTION OF PROCEDURE:      CRW head frame placement and stereotactic neuronavigation.     The patient was brought to the operating room and placed in a sitting position in her bed.  Brief timeout was performed for placement of the CRW head frame.  She was given sedation to make her comfortable.  Intended pin sites, two in the front and two in the back of the head, were cleaned and were injected with local anesthetic, 1% Lidocaine with epinephrine.  Total of 20 mL were used.  Then, CRW stereotactic head frame was placed onto her head with the four pins for fixation.  Care was taken so that the fiducial box would fit over the head and the frame.  Once the head frame was on, the fiducial box was easily placed without interference from her forehead.  Ha catheter was also placed.  The patient tolerated the procedure well and her sedation was lightened and she was awakened.     After the CRW stereotactic head frame was placed, she was taken directly to the CT scanner, at which time CT of the head stereotactic protocol was obtained.  Subsequently, the patient was taken back up to the operating room where she was placed supine on the operative bed with the CRW head frame affixed to the bed as well.  Patient was  in a slight sitting up position with the neck in a neutral position and she was made comfortable.  The bed was positioned so that it would be a beach chair reclining position (head up, legs down, body trendelenburg).  All pressure points were well padded.  Care was taken to make sure that the neck was in neutral position and that the [place] head maxwell device had room for manipulation in case more flexion or extension is needed throughout the case.    At this time, attention was turned to the neuronavigation imaging that was obtained.  The Stealth neuronavigation device was loaded with the CT head that had just been obtained.  The device also had an MRI of the head, stereotactic protocol, that was obtained prior to surgery.  CT of the head was merged with the previously obtained MRI of the brain.  The merge demonstrated good coherence/registration.  Then, using this merged image, neuronavigation was used to stereotactically target the right ventral intermediate nucleus of the thalamus (Vim).  The technique used was the AC-PC line localization technique to indirectly target the Vim using stereotactic coordinates and the X, Y and Z as well as the ring and arc angles for the CRW head frame were obtained for the right side.  The distance from the wall of the 3rd ventricle was approximately 10.0 mm.  The target was also adjusted 0.5 mm posteriorly so that the posterior Jose G Gun could map the Vc.  The entry into the skull, as well as the trajectory of the electrode were checked with a probe's eye view to avoid any sulci, ventricle or vascular structures.     The stereotactic coordinates for the right side was then transferred to the CR stereotactic targeting apparatus as well as the phantom base.  The coordinates were confirmed and double checked for accuracy.  Accuracy was also confirmed using phantom base.  The coordinates were X = +12.9, Y = -10.7, Z = +1.6, ring angle = 58.7 degrees anterior, and arc angle = 15.4  "degrees to the right.     At this time, attention was turned to the patient.  Using a hair clipper, her hair over the right frontal area was clipped using the surgical clipper.  Patient has a history of plastic surgery, a brow lift procedure, she she has a curvilinear bicoronal bifrontal incision.  Therefore, a semicircular incision was not ideal, as it would leave an \"island\" of skin flap.  Therefore, a curved incision which crosses the existing incision and opening towards the right side was planned.  This would preserve the blood supply to the scalp flap in all areas.  Then, the surgical area was prepped and draped in routine surgical fashion.  I also prepped the area posterior to this as well as area towards the right parietal area connector portion of the extension wire.  The patient was also made comfortable.    Timeout was then performed confirming the patient's identity, procedure to be performed, side and site of surgery identified, imaging corresponding with the patient and administration of preoperative prophylactic antibiotic.    Right-sided electrode placement with microelectrode recording: Target right Vim.     The CRW targeting apparatus was attached to the head frame on top of the sterile drapes.  The entry point was marked on the scalp on the right side.  As noted, the entry point was near the existing bicoronal bifrontal incision.  A curved incision as described above was made with a skin knife, after the area was injected with local anesthetic, 1% Lidocaine with epinephrine and 1/4% Marcaine plain, 50:50 mix.  The area posterior to the incision was also injected as a pocket would be created.  Area posterior lateral, towards the right parietal area, was also injected as the electrode connector will be tunneled here later.  Total of 16 mL of the above mentioned local anesthetic was used.  The incision was then further carried down to the pericranium.  Hemostasis was obtained using bipolar cautery.  " Thin layer of pericranial tissue was left behind on the scalp and the skin flap was reflected posteriorly.  Of note, as a postsurgical site, there was minimal pericranial layer.  Then, using a blunt dissection technique, a pocket was created posteriorly as well as a track made towards the right parietal area for later use.    At this time, the targeting apparatus was again positioned and the entry point to the skull was marked.  Area of the intended jean hole was cleaned and pericranial tissue removed.  Then using an acorn drill, jean hole was created over this entry point to expose the dura.  The area was vigorously irrigated and bone wax used to control the bone bleeding.  Dura was coagulated with bipolar cautery for hemostasis, and again no active bleeding was noted.     At this time, the electrode fixation cover was fixed to the skull using two screws.  Care was taken to overlap the pericranium over the edge of the cover to provide a smooth tissue transition.  Then, the electrode drive was attached to the targeting apparatus.  The entry into the dura was again checked.  It appeared that all positions of the Jose G Gun electrode maxwell were accessible without any interference from the bone edge.  Then using a Bovie cautery and a #4 Penfield instrument, opening was made into the dura, as well as a small corticectomy.  No active bleeding was noted.    Dr. Alee Garcia, Dr. Salvatore Leigh and Ms. Alecia Haynes of the Neurology Department participated in the recording and neurological testing.  During the microelectrode recording and testing, they took detailed notes of the electrophysiologic data and neurologic exam as well as any stimulation effects.    At this time, the electrode guide tubes were inserted in the center and posterior Jose G Gun positions and they were advanced slowly until they were fully inserted at which point the tips would be well above the target.  No abnormal resistance was noted.  Duraseal was used  "to seal the entry site to provide a seal and to minimize cerebrospinal fluid leak and air entry.  Then, recording microelectrodes were brought in and placed within the guide tubes and they were connected for intraoperative recording.  The tips of the electrode were now 15 mm above target.  The patient was awakened.  Then, using a micro drive, the electrodes were slowly advanced towards the target, collecting microelectrode recording data for mapping the track.  Throughout the track, good quality expected recording was observed.  Center track demonstrated moderately robust thalamic activity.  The somatosensory response activity were not strong and there were questionable area where the somatosensory response activity seemed to correlate with hip and shoulder and possibly tongue.  Clear deep tactile somatosensory response activity was noted in the tongue.  No Vc was encountered.  No side effect with microstimulation was noted.  Transient paresthesias in the mouth which felt like \"pressure\" was noted with ring stimulation of 2 mA at 3 mm above the target level.  Posterior track demonstrated robust thalamic activity but somatosensory response activity was not strong.  Deep squeeze in the bicep and possible tongue protrusion related activity were noted.  Shoulder, elbow, and tongue retraction proprioceptive response was noted.  No Vc was encountered.  High upper extremity paresthesia thresholds of 60 to 100 microamps were noted with microstimulation.  Paresthesias in the face which felt like \"pressure\" was noted with ring stimulation of 1 mA at 3 mm above the target level.  Of note, patient had a lesion effect at this point.    Based on the mapping and stimulation data, it was decided that the current center Jose G Gun position may be a good placement for the permanent electrode.  The electrode drive was positioned at the desired position and the electrodes were pulled out of the guide tubes.  The permanent DBS electrode " was brought into the field.  It was first tested in normal saline and all the impedance values were within normal.  Then, the electrode was placed in the center guide tube with the tip being placed at the target depth.  Electrode was also positioned so that the contact 2A would be facing forward.  The electrode demonstrated good impedance values.  The electrode was tested and the stimulation yielded further benefit with good side effect thresholds.  With 0-, transient paresthesias in the mouth was noted with 0.5 to 0.8 V, and in the upper extremities with 0.8 to 4.0 V, and slight ataxia with 3.6 V (4.2 mA).  With 1abc-, transient paresthesias in the face was noted with 3.4 V and in the hand with 3.4 to 5 V.  Internal capsular effect in the lip was noted with 5 V (6.7 mA).  With 2abc-, transient paresthesias in the face and internal capsular effect in the lip was noted with 5.5 V (6.6 mA).  With 3-, internal capsular effect in the lip and sensation which was unclear if it was paresthesia or internal capsular effect in the mouth at 7 V (7.4 mA).  Based on the results, it was thought that the current location may be the best location of the permanent electrode.    Local field potential data was also collected while the patient performed some tasks.    With the satisfactory testing of the electrode position and the stimulation parameters, the electrode was secured at this location.  The patient was again made comfortable.  The guide tubes were pulled out of the brain.  Duraseal was again used to seal any opening.  The area was generously irrigated.  Hemostasis was also obtained.  Fluoroscopy was brought into the field to test that the electrode did not move with each manipulation.  The electrode tip was seen to be at the target depth, as expected.  The electrode clamp was applied to hold the electrode.  Then, the electrode stylet was removed.  The electrode was then removed from the electrode maxwell.  The cap cover was  finally placed and secured in place.  Fluoroscopy confirmed that the tip of the electrode did not change in position with each manipulation.  The directional marker, on fluoroscopy, also demonstrated that the contact 2A is facing anteriorly.    The connecting portion of the electrode was covered with a protective covering/dead-end connector, and this apparatus was inserted into the subgaleal space/pocket towards the right parietal area that was created at the beginning of the case.  The excess wire was also buried posteriorly in the previously created pocket.  Fluoroscopy confirmed that the tip did not move.  The wound was then generously irrigated.    The galeal layer was reapproximated using 3-0 Vicryl sutures and the skin was reapproximated using 4-0 Monocryl suture in a running fashion.  The wound was cleaned and dried and prepped with ChoraPrep.  Then, Dermabond skin glue was applied.    Removal of the head frame and end of procedure    First, the stereotactic targeting apparatus was removed.  Then, the sterile drapes were removed.  Subsequently, the patient was taken out of the CRW head frame.  The four pin sites were clean and dry and no active bleeding was noted.  Antibiotic ointment was applied to the pin sites.    Patient was further awakened and taken to the recovery room in a stable condition.  At the end of the case, all counts including needle, sponge and instrument counts were correct x 2.  There were no complications.    IBrynn M.D., Ph.D., Neurosurgery Attending, was present and scrubbed for the entire case and performed the entire case.      BRYNN ANDUJAR MD, PHD

## 2025-05-14 NOTE — PLAN OF CARE
Pt POD#1 DBS phase 1, vss, neuros include: intact. PIV removed after 3rd ABX, regular diet, voids spont, no BM this shift, up ad rosa. PRN oxy and Tylenol provided with relief, pt ambulated hallways with family. Pt's incision SOPHIE/WNL, discharge education provided to pt and her daughter in law. All questions answered and discussed, pt left via w/c for home with family,.

## 2025-05-14 NOTE — PROGRESS NOTES
[facility] Discharge Summary and Instructions    Aida Zhong MRN# [id number]   Age: 65 year old YOB: 1960     Date of Admission:  5/13/2025  Date of Discharge::  5/14/2025  Admitting Physician:  Hermann Schumacher MD  Discharge Physician:  Hermann Schumacher MD          Admission Diagnoses:   Essential tremor [G25.0]  S/P deep brain stimulator placement [Z96.89]          Discharge Diagnosis:     Essential tremor [G25.0]  S/P deep brain stimulator placement [Z96.89]            Procedures:   Stealth Right side deep brain stimulator placement, phase I, placement of right side deep brain stimulator electrode, target right ventral intermediate nucleus of the thalamus, with microelectrode recording, Right - Head            Brief History of Illness:   Ms. Aida Zhong is a 64 year old left handed female with significant past medical history of essential tremor. Patient states that her tremor began when she was 13 years old. She was ultimately diagnosed with essential tremor in 2014.  Her tremor has progressed and it is now interfering with drinking and eating. Patient has elected to undergo above-mentioned procedure.           Hospital Course:   Patient underwent above-mentioned procedure on 5/13/2025.  Following the procedure the patient was transferred to surgical floor. Head CT and Skull XR revealed expected post-operatively changes. Patient received 3 doses of IV ABx post-operatively. On post operative day 1, she was ambulating, voiding without a martinez, eating a regular diet, pain was well controlled and therefore she was discharged to home.  Patient will return [facility] for phase 2 battery placement on 5/22/2025.           Discharge Medications:     Current Discharge Medication List        START taking these medications    Details   oxyCODONE (ROXICODONE) 5 MG tablet Take 1 tablet (5 mg) by mouth every 6 hours as needed for moderate pain.  Qty: 12 tablet, Refills: 0    Associated Diagnoses:  "Essential tremor      senna-docusate (SENOKOT-S/PERICOLACE) 8.6-50 MG tablet Take 1 tablet by mouth 2 times daily for 10 days.  Qty: 20 tablet, Refills: 0    Associated Diagnoses: Essential tremor           CONTINUE these medications which have NOT CHANGED    Details   !! FLUoxetine (PROZAC) 20 MG capsule Take 20 mg by mouth at bedtime.      !! FLUoxetine (PROZAC) 40 MG capsule Take 40 mg by mouth at bedtime.      methylphenidate (RITALIN) 10 MG tablet Take 15 mg by mouth 2 times daily.      Multiple Vitamins-Minerals (EQ MULTIVITAMINS ADULT GUMMY PO) Take 2 chew tab by mouth daily.      rosuvastatin (CRESTOR) 5 MG tablet Take 1 tablet by mouth at bedtime       !! - Potential duplicate medications found. Please discuss with provider.        STOP taking these medications       ciprofloxacin (CIPRO) 500 MG tablet Comments:   Reason for Stopping:                      Exam:   Physical Exam  /56 (BP Location: Right arm)   Pulse 72   Temp 98.1  F (36.7  C) (Oral)   Resp 16   Ht 1.727 m (5' 8\")   Wt 69.7 kg (153 lb 10.6 oz)   SpO2 96%   BMI 23.36 kg/m    General: Appears comfortable, NAD  Wound: Incision, clean, dry, intact without strikethrough  Neurologic Exam:  - AOx3.  - Follows commands.  - Speech fluent, spontaneous. No aphasia or dysarthria.  - No gaze preference. No apparent hemineglect.  - PERRL, EOMI.  - Face symmetric with sensation intact to light touch.  - Palate elevates symmetrically, uvula midline, tongue protrudes midline.  - Trapezii and sternocleidomastoid muscles 5/5 bilaterally.  - No pronator drift.  Motor: Normal bulk/tone; no rigidity, or bradykinesia.            Discharge Instructions and Follow-Up:     Discharge diet: Regular   Discharge activity: Do not do any bending, twisting, strenuous exercise, or heavy lifting (greater than 10 pounds) for 4-6 weeks. Be careful and ask for assistance when walking or going up and down stairs. Avoid any activities that could result in trauma to the " surgical wound. Do not drive while using narcotics or other sedating medications, such as sleep aids, muscle relaxants, etc.     Discharge follow-up: Return to [facility] on 5/22/2025 for phase 2 OR    Follow-up Dr. Hermann Schumacher MD on 7/7/2025, all additional follow-up visits to be determined by Dr. Hermann Schumacher MD       Wound care: Ok to shower,however no scrubbing of the wound and no soaking of the wound, meaning no bathtubs or swimming pools. Pat dry only. Leave wound open to air.  Patient to have wound checked 2 weeks following surgery.    Wound location: scalp  Closure technique: sutures  Dressing needs: none  Post-op care at follow-up: Keep dry and clean       Please call if you have:  1. increased pain, redness, drainage, swelling at your incision  2. fevers > 101.5 F degrees  3. with any questions or concerns.  You may reach the Neurosurgery clinic at [phone] during regular work hours. ER at [phone].    and ask for the Neurosurgery Resident on call at [phone], during off hours or weekends.         Discharge Disposition:     Discharged to home        KVNG Borjas, CNP  Neurosurgery  Pager [phone]

## 2025-05-14 NOTE — PLAN OF CARE
Goal Outcome Evaluation:      Plan of Care Reviewed With: patient    Overall Patient Progress: improvingOverall Patient Progress: improving    Outcome Evaluation: Pt voices appreciation for Transportation Resources and for [place] [facility].    JACKIE Mckeon  Social Work, 6A  Phone:  [phone]  Pager:  [phone]  5/14/2025

## 2025-05-14 NOTE — PHARMACY-ADMISSION MEDICATION HISTORY
Pharmacist Admission Medication History    Admission medication history is complete. The information provided in this note is only as accurate as the sources available at the time of the update.    Information Source(s): Patient via in-person, dispense record    Pertinent Information:   - Patient completed her 5 day course of ciprofloxacin on 5/11.  - Patient takes fluoxetine 40 mg + 20 mg for a total of 60 mg QHS.  - [facility] pharmacy [facility] in [place].    Changes made to PTA medication list:  Added: Gummy MVI  Deleted: None  Changed: None    Allergies reviewed with patient and updates made in EHR: yes    Medication History Completed By:   Jennifer Garrison, PharmD      PTA Med List   Medication Sig Note Last Dose/Taking    FLUoxetine (PROZAC) 20 MG capsule Take 20 mg by mouth at bedtime. 5/13/2025: Takes 60 mg total QHS 5/11/2025 Bedtime    FLUoxetine (PROZAC) 40 MG capsule Take 40 mg by mouth at bedtime. 5/13/2025: Takes 60 mg total QHS 5/11/2025 Bedtime    methylphenidate (RITALIN) 10 MG tablet Take 15 mg by mouth 2 times daily. 5/13/2025: Takes at 0700 and noon 5/11/2025 Noon    Multiple Vitamins-Minerals (EQ MULTIVITAMINS ADULT GUMMY PO) Take 2 chew tab by mouth daily.  5/5/2025 Morning    rosuvastatin (CRESTOR) 5 MG tablet Take 1 tablet by mouth at bedtime  5/4/2025 Bedtime

## 2025-05-14 NOTE — CONSULTS
Care Management Initial Consult    General Information  Assessment completed with:  (Patient (pt's daughter in law, Emma was present in the room)), Patient (pt's daughter in law, Emma was present in the room)  Type of CM/SW Visit: Initial Assessment    Primary Care Provider verified and updated as needed: Yes (TANISHA Mcconnell at [facility] in [place])   Readmission within the last 30 days: no previous admission in last 30 days      Reason for Consult:  (Social Determinants of Health Concerns)  Advance Care Planning:    Pt states that she has a health care directive but did not bring a copy with her to [facility]       Communication Assessment  Patient's communication style: spoken language (English or Bilingual)    Hearing Difficulty or Deaf: no   Wear Glasses or Blind: no    Cognitive  Cognitive/Neuro/Behavioral: WDL  Level of Consciousness: alert  Arousal Level: opens eyes spontaneously  Orientation: oriented x 4  Mood/Behavior: calm, cooperative  Best Language: 0 - No aphasia  Speech: clear, logical, spontaneous    Living Environment:   People in home: alone     Current living Arrangements: apartment      Able to return to prior arrangements: no       Family/Social Support:  Care provided by: self  Provides care for: no one  Marital Status:   Support system: Children          Description of Support System: Supportive    Support Assessment: Adequate family and caregiver support    Current Resources:   Patient receiving home care services: No        Community Resources:  (Pt states that she is on a wait list for rent assistance.  Pt states that she receives SNAP benefits.  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  Yes     Which insurance plan 3 day waiver is available? Alternative insurance waiver    Will the waiver be used for post-acute placement? No    Lifestyle & Psychosocial Needs:  Social Drivers of Health     Food Insecurity: High Risk (5/13/2025)    Food Insecurity     Within the past 12 months, did you worry that your food would run out before you got money to buy more?: Yes     Within the past 12 months, did the food you bought just not last and you didn t have money to get more?: Yes   Depression: Not at risk (4/28/2025)    PHQ-2     PHQ-2 Score: 0   Housing Stability: Low Risk  (5/13/2025)    Housing Stability     Do you have housing? : Yes     Are you worried about losing your housing?: No   Recent Concern: Housing Stability - Medium Risk (3/28/2025)    Received from [facility]    Housing Stability     What is your housing situation today?: 2   Tobacco Use: Low Risk  (5/5/2025)    Received from [employer]    Patient History     Smoking Tobacco Use: Never     Smokeless Tobacco Use: Never     Passive Exposure: Not on file   Financial Resource Strain: Low Risk  (5/13/2025)    Financial Resource Strain     Within the past 12 months, have you or your family members you live with been unable to get utilities (heat, electricity) when it was really needed?: No   Recent Concern: Financial Resource Strain - Medium Risk (3/28/2025)    Received from [employer]    Financial Resource Strain     Difficulty of Paying Living Expenses: 2     Difficulty of Paying Living Expenses: 1   Alcohol Use: Not on file   Transportation Needs: High Risk (5/13/2025)    Transportation Needs     Within the past 12 months, has lack of transportation kept you from medical appointments, getting your medicines, non-medical  meetings or appointments, work, or from getting things that you need?: Yes   Physical Activity: Not on file   Interpersonal Safety: Low Risk  (5/13/2025)    Interpersonal Safety     Do you feel physically and emotionally safe where you currently live?: Yes     Within the past 12 months, have you been hit, slapped, kicked or otherwise physically hurt by someone?: No     Within the past 12 months, have you been humiliated or emotionally abused in other ways by your partner or ex-partner?: No   Stress: Not on file   Social Connections: Socially Integrated (3/28/2025)    [facility]    Social Connections     Do you often feel lonely or isolated from those around you?: 0   Health Literacy: Not on file       Functional Status:  Prior to admission patient needed assistance:   Dependent ADLs:: Independent  Dependent IADLs:: Independent       Mental Health Status:  Mental Health Status:  (Pt has a history of depression.)  Mental Health Management:  (Pt states that she receives medication therapy, see's a psychiatrist and see's a therapist.)    Chemical Dependency Status:  Chemical Dependency Status: No Current Concerns             Values/Beliefs:  Spiritual, Cultural Beliefs, Religion Practices, Values that affect care: yes          Values/Beliefs Comment: Pt states that she was raised [place]. Pt states that she is a very spiritual person.    Discussed  Partnership in Safe Discharge Planning  document with patient/family: No    Additional Information:  SW received MD referral to see pt for SDOH concerns.    SW me with pt (daughter in law Emma was present in the room) and introduced role of SW.  Prior to hospitalization pt was living alone in a 1st floor apt ([address] plus building).  There are no steps to enter the building.  Elevators are available within the building.  Pt's apt includes a washer and dryer.  Pt's bath has a step in shower.   Pt states that prior to  "hospitalization she was indep with all mobility (no assistive device, no falls to the ground in the past year), adl's and with completion of housekeeping, laundry, cooking and shopping tasks.  Pt states that she was indep with medication set up and administration.  Pt states that she has alarm system in place to remind her to take her medications.  Pt states that she does not have a car.  Pt states that she rely's on family members and the [employer] for Transportation needs.  Pt states that she has a health care directive but did not bring a copy to [facility].  Pt states that she did not serve in the .  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  Pt states that she receives Social Security benefits.  Pt states  has a history of depression.  Pt states that she receives medication therapy, see's a psychiatrist ([facility] in [place]) and see's a therapist ([facility] in [place] 2x month, virtually).  Pt does not have a CD history.  Pt receives primary care from TANISHA Mcconnell at [facility] in [place].  Pt has not had add'l hospitalizations in the past 30 days.   Pt states that she was not receiving skilled home care services and she does not have a known .  Pt states that she is on a wait list for rent assistance.  Pt states that she received energy assistance this past winter.   Pt states that she is \"happily .\"    Pt states that she has 4 adult children who are supportive:  - Cookie, resides in [facility], resides in [facility], resides in [facility] [facility] ( to Emma), resides in [place].      In regards to disposition, pt is planning to discharge to her son (Eddie) and daughter in law's (Emma) home and Emma states that pt can stay for as long as " pt chooses.       Upon admit to [facility], nursing staff completed Social Determinants of health questions and pt's response to food insecurity and Transportation triggered the need for SW follow up.  Pt's responses were as follows:    Food Insecurity  Within the past 12 months, did you worry that your food would run out before you got money to buy more?  Yes  Within the past 12 months, did the food you bought just not last and you didn t have money to get more?  Yes    Transportation Needs  Within the past 12 months, has lack of transportation kept you from medical appointments, getting your medicines, non-medical meetings or appointments, work, or from getting things that you need?  Yes.      SW spoke with pt about food insecurity.  Pt states that that she receives SNAP benefits through the [facility].  Pt states that she is aware of the locations of food shelves in [facility] and know how to access them.  Pt states that she does not worry that her food will run out.  Pt states that she is never without food and has no food related concerns at this time.    In regards to transportation,  pt states that her family  provides her with transportation and she also uses a shuttle through the [employer].  SW informed pt that her health plan ([employer]) also offers free transportation to none [facility] medical appts through [employer]. SW provided pt with contact information for this program.     SW provided pt with education  (verbally and in writing) about [place] and provided the number to [facility] to schedule a [place].               Next Steps:  No further SW intervention  planned at this time as return to home is anticipated today.    Pt voiced appreciation for SW intervention.    JACKIE Mckeon  Social Work, 6A  Phone:  [phone]  Pager:  [phone]  5/14/2025       VICKY Holder

## 2025-05-15 ENCOUNTER — PATIENT OUTREACH (OUTPATIENT)
Dept: NEUROSURGERY | Facility: CLINIC | Age: 65
End: 2025-05-15
Payer: COMMERCIAL

## 2025-05-15 NOTE — CONFIDENTIAL NOTE
Pt s/p Right side deep brain stimulator placement, phase I, placement of right side deep brain stimulator electrode, target right ventral intermediate nucleus of the thalamus, with microelectrode recording by Dr. Schumacher on 5/13 and discharge 5/14/25. Left VM to check on pt's postop status. Requested a call back at pt's convenience. Will follow up.

## 2025-05-15 NOTE — DISCHARGE SUMMARY
[facility] Discharge Summary and Instructions    Aida Zhong MRN# [id number]   Age: 65 year old YOB: 1960     Date of Admission:  5/13/2025  Date of Discharge::  5/14/2025  Admitting Physician:  Hermann Schumacher MD  Discharge Physician:  Hermann Schumacher MD          Admission Diagnoses:   Essential tremor [G25.0]  S/P deep brain stimulator placement [Z96.89]          Discharge Diagnosis:     Essential tremor [G25.0]  S/P deep brain stimulator placement [Z96.89]            Procedures:   Stealth Right side deep brain stimulator placement, phase I, placement of right side deep brain stimulator electrode, target right ventral intermediate nucleus of the thalamus, with microelectrode recording, Right - Head            Brief History of Illness:   Ms. Aida Zhong is a 64 year old left handed female with significant past medical history of essential tremor. Patient states that her tremor began when she was 13 years old. She was ultimately diagnosed with essential tremor in 2014.  Her tremor has progressed and it is now interfering with drinking and eating. Patient has elected to undergo above-mentioned procedure.           Hospital Course:   Patient underwent above-mentioned procedure on 5/13/2025.  Following the procedure the patient was transferred to surgical floor. Head CT and Skull XR revealed expected post-operatively changes. Patient received 3 doses of IV ABx post-operatively. On post operative day 1, she was ambulating, voiding without a martinez, eating a regular diet, pain was well controlled and therefore she was discharged to home.  Patient will return to [facility] for phase 2 battery placement on 5/22/2025.           Discharge Medications:     Discharge Medication List as of 5/14/2025 12:43 PM        START taking these medications    Details   oxyCODONE (ROXICODONE) 5 MG tablet Take 1 tablet (5 mg) by mouth every 6 hours as needed for moderate pain., Disp-12 tablet, R-0, E-Prescribe     "  senna-docusate (SENOKOT-S/PERICOLACE) 8.6-50 MG tablet Take 1 tablet by mouth 2 times daily for 10 days., Disp-20 tablet, R-0, E-Prescribe           CONTINUE these medications which have NOT CHANGED    Details   !! FLUoxetine (PROZAC) 20 MG capsule Take 20 mg by mouth at bedtime., Historical      !! FLUoxetine (PROZAC) 40 MG capsule Take 40 mg by mouth at bedtime., Historical      methylphenidate (RITALIN) 10 MG tablet Take 15 mg by mouth 2 times daily., Historical      Multiple Vitamins-Minerals (EQ MULTIVITAMINS ADULT GUMMY PO) Take 2 chew tab by mouth daily., Historical      rosuvastatin (CRESTOR) 5 MG tablet Take 1 tablet by mouth at bedtime, Historical       !! - Potential duplicate medications found. Please discuss with provider.        STOP taking these medications       ciprofloxacin (CIPRO) 500 MG tablet Comments:   Reason for Stopping:                      Exam:   Physical Exam  /56 (BP Location: Right arm)   Pulse 72   Temp 98.1  F (36.7  C) (Oral)   Resp 16   Ht 1.727 m (5' 8\")   Wt 69.7 kg (153 lb 10.6 oz)   SpO2 96%   BMI 23.36 kg/m    General: Appears comfortable, NAD  Wound: Incision, clean, dry, intact without strikethrough  Neurologic Exam:  - AOx3.  - Follows commands.  - Speech fluent, spontaneous. No aphasia or dysarthria.  - No gaze preference. No apparent hemineglect.  - PERRL, EOMI.  - Face symmetric with sensation intact to light touch.  - Palate elevates symmetrically, uvula midline, tongue protrudes midline.  - Trapezii and sternocleidomastoid muscles 5/5 bilaterally.  - No pronator drift.  Motor: Normal bulk/tone; no rigidity, or bradykinesia.            Discharge Instructions and Follow-Up:     Discharge diet: Regular   Discharge activity: Do not do any bending, twisting, strenuous exercise, or heavy lifting (greater than 10 pounds) for 4-6 weeks. Be careful and ask for assistance when walking or going up and down stairs. Avoid any activities that could result in trauma to " the surgical wound. Do not drive while using narcotics or other sedating medications, such as sleep aids, muscle relaxants, etc.     Discharge follow-up: Return to [facility] on 5/22/2025 for phase 2 OR    Follow-up Dr. Hermann Schumacher MD on 7/7/2025, all additional follow-up visits to be determined by Dr. Hermann Schumacher MD       Wound care: Ok to shower,however no scrubbing of the wound and no soaking of the wound, meaning no bathtubs or swimming pools. Pat dry only. Leave wound open to air.  Patient to have wound checked 2 weeks following surgery.    Wound location: scalp  Closure technique: sutures  Dressing needs: none  Post-op care at follow-up: Keep dry and clean       Please call if you have:  1. increased pain, redness, drainage, swelling at your incision  2. fevers > 101.5 F degrees  3. with any questions or concerns.  You may reach the Neurosurgery clinic at [phone] during regular work hours. ER at [phone].    and ask for the Neurosurgery Resident on call at [phone], during off hours or weekends.         Discharge Disposition:     Discharged to home        KVNG Borjas, CNP  Neurosurgery  Pager [phone]

## 2025-05-22 ENCOUNTER — HOSPITAL ENCOUNTER (OUTPATIENT)
Facility: CLINIC | Age: 65
Discharge: HOME HEALTH | End: 2025-05-22
Attending: NEUROLOGICAL SURGERY | Admitting: NEUROLOGICAL SURGERY
Payer: COMMERCIAL

## 2025-05-22 VITALS
SYSTOLIC BLOOD PRESSURE: 101 MMHG | HEIGHT: 68 IN | WEIGHT: 155.42 LBS | BODY MASS INDEX: 23.56 KG/M2 | RESPIRATION RATE: 18 BRPM | DIASTOLIC BLOOD PRESSURE: 66 MMHG | HEART RATE: 97 BPM | OXYGEN SATURATION: 95 % | TEMPERATURE: 97.9 F

## 2025-05-22 DIAGNOSIS — Z96.89 S/P DEEP BRAIN STIMULATOR PLACEMENT: Primary | ICD-10-CM

## 2025-05-22 PROCEDURE — 61886 IMPLANT NEUROSTIM ARRAYS: CPT | Mod: 58 | Performed by: NEUROLOGICAL SURGERY

## 2025-05-22 PROCEDURE — 250N000011 HC RX IP 250 OP 636: Mod: JZ

## 2025-05-22 PROCEDURE — 272N000001 HC OR GENERAL SUPPLY STERILE: Performed by: NEUROLOGICAL SURGERY

## 2025-05-22 PROCEDURE — C1820 GENERATOR NEURO RECHG BAT SY: HCPCS | Performed by: NEUROLOGICAL SURGERY

## 2025-05-22 PROCEDURE — 250N000025 HC SEVOFLURANE, PER MIN: Performed by: NEUROLOGICAL SURGERY

## 2025-05-22 PROCEDURE — 999N000141 HC STATISTIC PRE-PROCEDURE NURSING ASSESSMENT: Performed by: NEUROLOGICAL SURGERY

## 2025-05-22 PROCEDURE — C1787 PATIENT PROGR, NEUROSTIM: HCPCS | Performed by: NEUROLOGICAL SURGERY

## 2025-05-22 PROCEDURE — 370N000017 HC ANESTHESIA TECHNICAL FEE, PER MIN: Performed by: NEUROLOGICAL SURGERY

## 2025-05-22 PROCEDURE — 710N000010 HC RECOVERY PHASE 1, LEVEL 2, PER MIN: Performed by: NEUROLOGICAL SURGERY

## 2025-05-22 PROCEDURE — 360N000077 HC SURGERY LEVEL 4, PER MIN: Performed by: NEUROLOGICAL SURGERY

## 2025-05-22 PROCEDURE — C1883 ADAPT/EXT, PACING/NEURO LEAD: HCPCS | Performed by: NEUROLOGICAL SURGERY

## 2025-05-22 PROCEDURE — 710N000012 HC RECOVERY PHASE 2, PER MINUTE: Performed by: NEUROLOGICAL SURGERY

## 2025-05-22 PROCEDURE — 278N000051 HC OR IMPLANT GENERAL: Performed by: NEUROLOGICAL SURGERY

## 2025-05-22 PROCEDURE — 250N000011 HC RX IP 250 OP 636: Performed by: NEUROLOGICAL SURGERY

## 2025-05-22 PROCEDURE — 250N000011 HC RX IP 250 OP 636: Performed by: STUDENT IN AN ORGANIZED HEALTH CARE EDUCATION/TRAINING PROGRAM

## 2025-05-22 DEVICE — NEUROSTIMULATOR IMPLANTABLE PERCEPT DBS RC US EMANUAL B35300: Type: IMPLANTABLE DEVICE | Site: CHEST | Status: FUNCTIONAL

## 2025-05-22 DEVICE — ACC NRSTM CONNECTOR PLUG DBS B31061: Type: IMPLANTABLE DEVICE | Site: CHEST | Status: FUNCTIONAL

## 2025-05-22 DEVICE — IMPLANTABLE DEVICE: Type: IMPLANTABLE DEVICE | Site: CHEST | Status: FUNCTIONAL

## 2025-05-22 RX ORDER — SODIUM CHLORIDE, SODIUM LACTATE, POTASSIUM CHLORIDE, CALCIUM CHLORIDE 600; 310; 30; 20 MG/100ML; MG/100ML; MG/100ML; MG/100ML
INJECTION, SOLUTION INTRAVENOUS CONTINUOUS
Status: DISCONTINUED | OUTPATIENT
Start: 2025-05-22 | End: 2025-05-22 | Stop reason: HOSPADM

## 2025-05-22 RX ORDER — OXYCODONE HYDROCHLORIDE 10 MG/1
10 TABLET ORAL
Status: DISCONTINUED | OUTPATIENT
Start: 2025-05-22 | End: 2025-05-22 | Stop reason: HOSPADM

## 2025-05-22 RX ORDER — CEFAZOLIN SODIUM/WATER 2 G/20 ML
2 SYRINGE (ML) INTRAVENOUS
Status: COMPLETED | OUTPATIENT
Start: 2025-05-22 | End: 2025-05-22

## 2025-05-22 RX ORDER — ONDANSETRON 4 MG/1
4 TABLET, ORALLY DISINTEGRATING ORAL EVERY 30 MIN PRN
Status: DISCONTINUED | OUTPATIENT
Start: 2025-05-22 | End: 2025-05-22 | Stop reason: HOSPADM

## 2025-05-22 RX ORDER — FENTANYL CITRATE 50 UG/ML
25 INJECTION, SOLUTION INTRAMUSCULAR; INTRAVENOUS EVERY 5 MIN PRN
Status: DISCONTINUED | OUTPATIENT
Start: 2025-05-22 | End: 2025-05-22 | Stop reason: HOSPADM

## 2025-05-22 RX ORDER — HYDROMORPHONE HCL IN WATER/PF 6 MG/30 ML
0.4 PATIENT CONTROLLED ANALGESIA SYRINGE INTRAVENOUS EVERY 5 MIN PRN
Status: DISCONTINUED | OUTPATIENT
Start: 2025-05-22 | End: 2025-05-22 | Stop reason: HOSPADM

## 2025-05-22 RX ORDER — HYDROMORPHONE HCL IN WATER/PF 6 MG/30 ML
0.2 PATIENT CONTROLLED ANALGESIA SYRINGE INTRAVENOUS EVERY 5 MIN PRN
Status: DISCONTINUED | OUTPATIENT
Start: 2025-05-22 | End: 2025-05-22 | Stop reason: HOSPADM

## 2025-05-22 RX ORDER — NALOXONE HYDROCHLORIDE 0.4 MG/ML
0.1 INJECTION, SOLUTION INTRAMUSCULAR; INTRAVENOUS; SUBCUTANEOUS
Status: DISCONTINUED | OUTPATIENT
Start: 2025-05-22 | End: 2025-05-22 | Stop reason: HOSPADM

## 2025-05-22 RX ORDER — OXYCODONE HYDROCHLORIDE 5 MG/1
5 TABLET ORAL EVERY 6 HOURS PRN
Qty: 12 TABLET | Refills: 0 | Status: SHIPPED | OUTPATIENT
Start: 2025-05-22 | End: 2025-05-25

## 2025-05-22 RX ORDER — ONDANSETRON 2 MG/ML
4 INJECTION INTRAMUSCULAR; INTRAVENOUS EVERY 30 MIN PRN
Status: DISCONTINUED | OUTPATIENT
Start: 2025-05-22 | End: 2025-05-22 | Stop reason: HOSPADM

## 2025-05-22 RX ORDER — DEXAMETHASONE SODIUM PHOSPHATE 4 MG/ML
4 INJECTION, SOLUTION INTRA-ARTICULAR; INTRALESIONAL; INTRAMUSCULAR; INTRAVENOUS; SOFT TISSUE
Status: DISCONTINUED | OUTPATIENT
Start: 2025-05-22 | End: 2025-05-22 | Stop reason: HOSPADM

## 2025-05-22 RX ORDER — ONDANSETRON 4 MG/1
4 TABLET, ORALLY DISINTEGRATING ORAL EVERY 8 HOURS PRN
Qty: 20 TABLET | Refills: 0 | Status: SHIPPED | OUTPATIENT
Start: 2025-05-22

## 2025-05-22 RX ORDER — FENTANYL CITRATE 50 UG/ML
25 INJECTION, SOLUTION INTRAMUSCULAR; INTRAVENOUS ONCE
Status: COMPLETED | OUTPATIENT
Start: 2025-05-22 | End: 2025-05-22

## 2025-05-22 RX ORDER — CEPHALEXIN 500 MG/1
500 CAPSULE ORAL 3 TIMES DAILY
Qty: 21 CAPSULE | Refills: 0 | Status: SHIPPED | OUTPATIENT
Start: 2025-05-22 | End: 2025-05-29

## 2025-05-22 RX ORDER — FENTANYL CITRATE 50 UG/ML
50 INJECTION, SOLUTION INTRAMUSCULAR; INTRAVENOUS EVERY 5 MIN PRN
Status: DISCONTINUED | OUTPATIENT
Start: 2025-05-22 | End: 2025-05-22 | Stop reason: HOSPADM

## 2025-05-22 RX ORDER — CEFAZOLIN SODIUM/WATER 2 G/20 ML
2 SYRINGE (ML) INTRAVENOUS SEE ADMIN INSTRUCTIONS
Status: DISCONTINUED | OUTPATIENT
Start: 2025-05-22 | End: 2025-05-22 | Stop reason: HOSPADM

## 2025-05-22 RX ORDER — OXYCODONE HYDROCHLORIDE 5 MG/1
5 TABLET ORAL
Status: DISCONTINUED | OUTPATIENT
Start: 2025-05-22 | End: 2025-05-22 | Stop reason: HOSPADM

## 2025-05-22 RX ADMIN — FENTANYL CITRATE 25 MCG: 50 INJECTION, SOLUTION INTRAMUSCULAR; INTRAVENOUS at 12:01

## 2025-05-22 RX ADMIN — ONDANSETRON 4 MG: 2 INJECTION, SOLUTION INTRAMUSCULAR; INTRAVENOUS at 10:49

## 2025-05-22 RX ADMIN — PROCHLORPERAZINE EDISYLATE 5 MG: 5 INJECTION INTRAMUSCULAR; INTRAVENOUS at 12:01

## 2025-05-22 ASSESSMENT — ACTIVITIES OF DAILY LIVING (ADL)
ADLS_ACUITY_SCORE: 59

## 2025-05-22 NOTE — BRIEF OP NOTE
[facility]    Brief Operative Note    Pre-operative diagnosis: Essential tremor [G25.0]  Post-operative diagnosis Same as pre-operative diagnosis    Procedure: Deep brain stimulator placement, phase II, placement of deep brain stimulator generator/battery over the right chest wall, Right - Chest    Surgeon: Surgeons and Role:     * Brynn Schumacher MD - Primary     * Jovan Flores MD - Resident - Assisting  Anesthesia: General   Estimated Blood Loss: 1 mL    Drains: None  Specimens: * No specimens in log *  Findings:   Both extension wires tested OK.  All impedance values were within normal.  No problems found.  Complications: None.  Implants:   Implant Name Type Inv. Item Serial No.  Lot No. LRB No. Used Action   ACC NRSTM CONNECTOR PLUG DBS [id number] - [id number] Other ACC NRSTM CONNECTOR PLUG DBS [id number]  [employer] [id number] Right 1 Implanted   NEUROSTIMULATOR IMPLANTABLE PERCEPT DBS RC US EMANUAL [id number] - [id number] Neurology device NEUROSTIMULATOR IMPLANTABLE PERCEPT DBS [facility] EMANUAL [id number] [employer]  Right 1 Implanted   SenSight Extension Kit Neurology device  [id number] [employer]  Right 1 Implanted   SenSight Extension Kit Neurology device  [id number] [employer]  Right 1 Implanted       BRYNN SCHUMACHER MD, PHD

## 2025-05-22 NOTE — H&P
"NEUROSURGERY    HISTORY AND PHYSICAL EXAM    Chief Complaint   Patient presents with    SURGERY       Essential Tremor - Ongoing DBS surgery, s/p right side deep brain stimulator placement, phase I, placement of right side deep brain stimulator electrode, target right ventral intermediate nucleus of the thalamus with microelectrode recording on 5/13/2025     HISTORY OF PRESENT ILLNESS  Ms. Aida Zhong returns today for her ongoing DBS surgery.  She is s/p right side deep brain stimulator placement, phase I, placement of right side deep brain stimulator electrode, target right ventral intermediate nucleus of the thalamus with microelectrode recording on 5/13/2025.  Patient tolerated the procedure well and there were no complications.  Patient's postoperative CT head was without any concerning findings.  She was discharged to home on POD#1.  Patient reports that she is doing well and there are no complaints.  She reports no pain other than some tenderness at the right parietal area.  She denies any fevers, chills, vomiting, dizziness, weakness, numbness or seizure like activity.  She did have nausea with oxycodone.  She reports that the incision is looking good and there is no redness, no drainage and no fluid collection.  Overall, she is quite happy with the recent surgery.  Her left hand tremor is reduced still.    In summary, Ms. Aida Zhong is a 65 year old left handed female with a diagnosis of essential tremor.  Patient states that her tremor began when she was 13 years old.  She was at [employer] and people noted her tremor when she was lighting a candle.  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  She was ultimately diagnosed with essential tremor in 2014.  Her tremor has progressed and it is now interfering with drinking and eating.  She has seen Dr. Moon who is recommending DBS workup.  Patient states that she is very familiar with DBS and she has been \"following\" the " therapy and its development for quite some time.  Her goal for DBS is to better control her tremor, to be able to eat without spilling and to put makeup on without poking herself in the eye.  She also would like to be able to play with her grandchildren without having tremor.  Her tremor is bilateral and she would like her left side treated first.  She would like a rechargeable generator/battery and her first choice is [employer], followed by [employer] being the second choice.  We discussed the potential outcome of the DBS candidacy evaluation.  Upon completion of the evaluation, patient's case will be dicussed at the Movement Disorder Consensus Group Meeting.  Patient may not be considered to be a good candidate.  If that is the case, the group will provide a reason for our recommendation against DBS.  Patient may also be considered to be a good candidate and wait and see strategy may be recommended.  Patient may also be considered to be a good candidate and staged bilateral strategy may be recommended.  We discussed both phase I and II of DBS surgery.  We discussed that during phase I, we would place the DBS electrode on the right side under MAC and microelectrode recording.  She would then return one week later for the phase II with placement of the DBS generator/battery over the right chest wall under general anesthesia.  If she is a unilateral candidate or wait and see strategy candidate, then she will undergo another evaluation/discussion prior to proceeding to the left side implantation.  If she is a bilateral candidate in a staged fashion, she would return three weeks later for the phase I and II combined for the placement of the DBS electrode on the left side under MAC and microelectrode recording followed by connection to the previously implanted generator/battery.  Risks, benefits, alternative therapies were discussed with the patient, including but not limited to infection and bleeding  (intracranial), injury to the brain, stroke, seizure, death, hardware failure and possible need for more surgeries.  Surgical procedure was discussed in detail.  All questions were answered, and she expressed understanding.  Her case was discussed at the Movement Disorder Consensus Group Meeting and she was considered to be a good candidate for DBS surgery.  Recommendation was staged bilateral protocol, right side implantation first, target right then left Vim, and [employer] device with rechargeable generator/battery over the right chest wall.     Past Medical History:   Diagnosis Date    Depression     Essential tremor     HLD (hyperlipidemia)        Past Surgical History:   Procedure Laterality Date    ABDOMINOPLASTY      BROW LIFT      DILATION AND CURETTAGE      HC ARTHROSCOPIC REMOVAL GANGLION CYST-WRIST/HAND Right     HC ENLARGE BREAST WITH IMPLANT      HC REMOVAL OF BREAST IMPLANT      HYSTERECTOMY      OPTICAL TRACKING SYSTEM INSERTION DEEP BRAIN STIMULATION Right 5/13/2025    Procedure: Stealth Right side deep brain stimulator placement, phase I, placement of right side deep brain stimulator electrode, target right ventral intermediate nucleus of the thalamus, with microelectrode recording;  Surgeon: Hermann Schumacher MD;  Location: [facility] OR    TONSILLECTOMY         Social History     Socioeconomic History    Marital status:      Spouse name: Not on file    Number of children: Not on file    Years of education: Not on file    Highest education level: Not on file   Occupational History    Not on file   Tobacco Use    Smoking status: Never    Smokeless tobacco: Never   Vaping Use    Vaping status: Never Used   Substance and Sexual Activity    Alcohol use: Not Currently    Drug use: Never    Sexual activity: Not on file   Other Topics Concern    Not on file   Social History Narrative    Not on file     Social Drivers of Health     Financial Resource Strain: Low Risk  (5/13/2025)    Financial  Resource Strain     Within the past 12 months, have you or your family members you live with been unable to get utilities (heat, electricity) when it was really needed?: No   Recent Concern: Financial Resource Strain - Medium Risk (3/28/2025)    [facility]    Financial Resource Strain     Difficulty of Paying Living Expenses: 2     Difficulty of Paying Living Expenses: 1   Food Insecurity: High Risk (5/13/2025)    Food Insecurity     Within the past 12 months, did you worry that your food would run out before you got money to buy more?: Yes     Within the past 12 months, did the food you bought just not last and you didn t have money to get more?: Yes   Transportation Needs: High Risk (5/13/2025)    Transportation Needs     Within the past 12 months, has lack of transportation kept you from medical appointments, getting your medicines, non-medical meetings or appointments, work, or from getting things that you need?: Yes   Physical Activity: Not on file   Stress: Not on file   Social Connections: Socially Integrated (3/28/2025)    Received from [facility]    Social Connections     Do you often feel lonely or isolated from those around you?: 0   Interpersonal Safety: Low Risk  (5/22/2025)    Interpersonal Safety     Do you feel physically and emotionally safe where you currently live?: Yes     Within the past 12 months, have you been hit, slapped, kicked or otherwise physically hurt by someone?: No     Within the past 12 months, have you been humiliated or emotionally abused in other ways by your partner or ex-partner?: No   Housing Stability: Low Risk  (5/13/2025)    Housing Stability     Do you have housing? : Yes     Are you worried about losing your housing?: No   Recent Concern: Housing Stability - Medium Risk (3/28/2025)    [facility] from [facility]    Housing Stability     What is your housing  situation today?: 2       Family History   Problem Relation Age of Onset    Tremor Mother     Tremor Sister     Tremor Maternal Grandfather     Tremor Daughter     Tremor Daughter     Anesthesia Reaction No family hx of     Deep Vein Thrombosis (DVT) No family hx of        No current facility-administered medications for this encounter.       Allergies   Allergen Reactions    Penicillins Rash       Medications Prior to Admission   Medication Sig Dispense Refill Last Dose/Taking    FLUoxetine (PROZAC) 20 MG capsule Take 20 mg by mouth at bedtime.   Past Week    FLUoxetine (PROZAC) 40 MG capsule Take 40 mg by mouth at bedtime.   Past Week    methylphenidate (RITALIN) 10 MG tablet Take 15 mg by mouth 2 times daily. Takes at 0700 and 1200   5/21/2025    Multiple Vitamins-Minerals (EQ MULTIVITAMINS ADULT GUMMY PO) Take 2 chew tab by mouth daily.   Past Month    oxyCODONE (ROXICODONE) 5 MG tablet Take 1 tablet (5 mg) by mouth every 6 hours as needed for moderate pain. 12 tablet 0 Past Month    rosuvastatin (CRESTOR) 5 MG tablet Take 1 tablet by mouth at bedtime   Past Week    senna-docusate (SENOKOT-S/PERICOLACE) 8.6-50 MG tablet Take 1 tablet by mouth 2 times daily for 10 days. 20 tablet 0 Past Week       REVIEW OF SYSTEMS:  General: Negative for chills/sweats/fever. difficulty sleeping, headache, recent fatigue, or weight gain/loss.  Eyes: Negative for blurred vision, crossed eyes, double vision, recent eye infections, vision flashes, or vision halos.  Ears/Nose/Mouth/Throat: Negative for bleeding gums, difficulty swallowing, earache, ear discharge, hearing loss, hoarseness, nosebleeds, tinnitus, or sinus problems.  Respiratory: POSITIVE for ELVIA.  Negative for chronic cough, coughing blood, night sweats, shortness of breath, Tuberculosis, or wheezing.  Cardiovascular: Negative for chest pain, dyspnea at night, heart murmur, palpitations, pacemaker, pacemaker, poor circulation, swollen legs/feet, or varicose  "veins.  Gastrointestinal: Negative for melena, hematochezia, chronic diarrhea, heartburn, Hepatitis A/B/C, increasing constipation, Liver Disease, nausea, or vomiting.   Genitourinary: Negative for Urinary retention, genital discharge, urinary incontinence, urgency, or UTI.   Neurological: POSITIVE for essential tremor.  Negative for syncope, headaches, numbness of arms/legs, tingling in hands/arms/legs, memory problems, or seizures.  Psychological: POSITIVE for depression. Negative for anxiety, panic attacks, or restlessness.  Skin: Negative for chronic skin itching, color changes in hand/feet when cold, poor scarring, non-healing ulcers, skin rashes/hives, unusual moles.  Musculoskeletal: Negative for arthritis, joint swelling in hands/wrists/hips/knees/joints, muscle tenderness in arms/legs, or osteoporosis.  Endocrine: Negative for excessive thirst/hunger, intolerance for warm rooms, loss of libido, multiple broken bones, rapid weight gain/loss, galactorrhea, or thyroid issues.  Hematologic/Lymphatic: Negative for easy skin bruising, significant fatigue, prolonged bleeding, tender glands/lymph nodes.  Allergies: Negative for asthma or hay fever.      PHYSICAL EXAM  /72 (BP Location: Left arm)   Pulse 85   Temp 99.1  F (37.3  C) (Oral)   Resp 18   Ht 1.727 m (5' 8\")   Wt 70.5 kg (155 lb 6.8 oz)   SpO2 98%   Breastfeeding No   BMI 23.63 kg/m      General: Awake, alert, oriented. Well nourished, well developed, no acute distress.  HEENT: Head normocephalic, atraumatic. No carotid bruit. Neck supple. Good range of motion. No palpable thyroid mass.  Heart: Regular rhythm and rate. No murmurs.  Lungs: Clear to auscultation and percussion bilaterally. No rhonchi, rales or wheeze.  Abdomen: Soft, non-tender, non-distended. No hepatosplenomegaly.  Extremity: Warm with no clubbing or cyanosis. No lower extremity edema.  Incisions: Right frontal incision is healing well.  No redness.  No drainage.  No fluid " collection.    Neurological:  Awake, alert and oriented to date, time, place and person. Speech fluent.   Pupils equal, round, reactive to light.  Extraocular movement intact.  Visual fields are full on confrontation.  Hearing is grossly normal to finger rub.   Facial sensation intact.  Face symmetric.  Tongue midline.  Uvula elevates equally.    Motor: full strength throughout.  Sensation: intact to light touch and pinpoint.    General: Awake, alert, oriented. Well nourished, well developed, she is not in any acute distress.  HEENT: Head normocephalic, atraumatic. No carotid bruit. Neck supple. Good range of motion. No palpable thyroid mass.  Extremity: Warm with no clubbing or cyanosis. No lower extremity edema.       ASSESSMENT   65 year old left handed female  Essential tremor  Bilateral tremor, right worse than left  Status post right side deep brain stimulator placement, phase I, placement of right side deep brain stimulator electrode, target right ventral intermediate nucleus of the thalamus with microelectrode recording on 5/13/2025      Patient is recovering well from the recent DBS phase I surgery.  Her incision is healing well with no signs of infection.  She is doing well overall.      I told her that nausea from oxycodone is not uncommon.  We will send her home with some Zofran.    We discussed the phase II of DBS surgery, placement of the DBS generator/battery over the right chest wall under general anesthesia.  Since she is a bilateral candidate in a staged fashion, she would return three weeks later for the phase I and II combined for the placement of the DBS electrode on the left side under MAC and microelectrode recording followed by connection to the previously implanted generator/battery.      Risks, benefits, alternative therapies were discussed with the patient, including but not limited to infection and bleeding (intracranial), injury to the brain, stroke, seizure, death, hardware failure and  possible need for more surgeries.  Surgical procedure was discussed in detail.  All questions were answered, and she expressed understanding.        PLAN  1.  Deep brain stimulator placement, phase II, placement of deep brain stimulator generator/battery over the right chest wall

## 2025-05-22 NOTE — DISCHARGE INSTRUCTIONS
Contacting your Doctor -   To contact a doctor, call Dr. Fulton office at [phone]  or:  [phone] and ask for the resident on call for Neurology (answered 24 hours a day)   Emergency Department:  [facility]: [phone]  [facility]: [phone] 911 if you are in need of immediate or emergent help      After Anesthesia (Sleep Medicine)    What Should I Do After Anesthesia?    --You should rest and relax for the next 24 hours.     Avoid risky or difficult (strenuous) activity.     A responsible adult should stay with you overnight.    --Don't drive or use any heavy equipment for 24 hours.      Even if you feel normal, your reactions may be affected by the sleep medicine you were given.    --Don't drink alcohol or make any important decisions for 24 hours.    --Slowly get back to your regular diet, as you feel able.      How Should I Expect to Feel?    --It's normal to feel dizzy, light-headed, or faint for up to a full day after anesthesia     or while taking pain medicine.        If This Happens:    --Sit down for a few minutes before standing.    --Have someone help you when you get up to walk or use the bathroom.    --If you have nausea (feel sick to your stomach) or vomit (throw up):    --Drink clear liquids (such as apple juice, ginger ale, broth or 7UP) until you feel better.    --If you feel sick to your stomach, or you keep vomiting for 24 hours, please CALL the DOCTOR.    What Else Should I Know?    --You might have a dry mouth, sore throat, muscle aches, or trouble sleeping. These should go away after 24 hours.    --Please contact your doctor if you have any other symptoms that concern you, such as fever, pain,      Bleeding, fluid drainage, swelling, or headache, or if it has been over 8 to 10 hours, and you have not been able to urinate (or pee).    --If you have a history of SLEEP APNEA, it is very important to use your CPAP machine for the next 24 hours when you doze, nap, or  sleep.      If you have concerns, call Dr Schumacher at [phone] at the Neurosurgery Clinic from 8 am till 5 pm --    OR:    Call the  [facility] main number at [phone] and ask for the Neurosurgery doctor on call.

## 2025-05-25 NOTE — OP NOTE
PATIENT NAME: TRACY HORTON  YOB: 1960  MRN:   [id number]  ACCOUNT:  [id number]      DATE OF PROCEDURE:  05/22/2025    PREOPERATIVE DIAGNOSIS:    1.  Essential tremor  2.  Bilateral tremor, right worse than left  3.  Status post right side deep brain stimulator placement, phase I, placement of right side deep brain stimulator electrode, target right ventral intermediate nucleus of the thalamus with microelectrode recording on 5/13/2025    POSTOPERATIVE DIAGNOSIS:    1.  Essential tremor  2.  Bilateral tremor, right worse than left  3.  Status post right side deep brain stimulator placement, phase I, placement of right side deep brain stimulator electrode, target right ventral intermediate nucleus of the thalamus with microelectrode recording on 5/13/2025    PROCEDURES PERFORMED  1.  Deep brain stimulator placement, phase II, placement of new deep brain stimulator generator/battery over the right chest wall.  2.  Placement of two extension wires and connection of the right side deep brain stimulator electrode, one electrode array, to the new generator/battery.  3.  Electrical analysis of the deep brain stimulator system.     ATTENDING SURGEON:  Hermann Schumacher MD, PhD    RESIDENT SURGEON:  Jovan Flores MD, PhD    ANESTHESIA:  General anesthesia.    ESTIMATED BLOOD LOSS:  1 mL.    COMPLICATIONS:  None.    FINDINGS:  All impedance values within normal for the right side system.  All impedance values within normal for the future left side extension wire.  No problems found.      EXPLANT:  Medtronic dead end electrode protector    IMPLANT:   1.  Medtronic DBS generator/battery, Percept RC, model T94367, S/N [id number].  2.  Medtronic extension wire for the left side, model A84225-69D, S/N [id number], marked for left side, plugged.  3.  Medtronic extension wire for the right side, model H38049-75, S/N [id number], unmarked for right side.  4.  Medtronic extension wire plug for left side, REF [id number], Lot#  "[id number].    INDICATIONS FOR PROCEDURE:  Ms. Aida Zhong returns today for her ongoing DBS surgery.  She is s/p right side deep brain stimulator placement, phase I, placement of right side deep brain stimulator electrode, target right ventral intermediate nucleus of the thalamus with microelectrode recording on 5/13/2025.  Patient tolerated the procedure well and there were no complications.  Patient's postoperative CT head was without any concerning findings.  She was discharged to home on POD#1.  Patient reports that she is doing well and there are no complaints.  She reports no pain other than some tenderness at the right parietal area.  She denies any fevers, chills, vomiting, dizziness, weakness, numbness or seizure like activity.  She did have nausea with oxycodone.  She reports that the incision is looking good and there is no redness, no drainage and no fluid collection.  Overall, she is quite happy with the recent surgery.  Her left hand tremor is reduced still.  In summary, Ms. Aida Zhong is a 65 year old left handed female with a diagnosis of essential tremor.  Patient states that her tremor began when she was 13 years old.  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  She was ultimately diagnosed with essential tremor in 2014.  Her tremor has progressed and it is now interfering with drinking and eating.  She has seen Dr. Moon who is recommending DBS workup.  Patient states that she is very familiar with DBS and she has been \"following\" the therapy and its development for quite some time.  Her goal for DBS is to better control her tremor, to be able to eat without spilling and to put makeup on without poking herself in the eye.  She also would like to be able to play with her grandchildren without having tremor.  Her tremor is bilateral and she would like her left side treated first.  " She would like a rechargeable generator/battery and her first choice is Medtronic, followed by [employer] being the second choice.  Her case was discussed at the Movement Disorder Consensus [employer] Meeting and she was considered to be a good candidate for DBS surgery.  Recommendation was staged bilateral protocol, right side implantation first, target right then left Vim, and Medtronic device with rechargeable generator/battery over the right chest wall.  We discussed the phase II of DBS surgery, placement of the DBS generator/battery over the right chest wall under general anesthesia.  Since she is a bilateral candidate in a staged fashion, she would return three weeks later for the phase I and II combined for the placement of the DBS electrode on the left side under MAC and microelectrode recording followed by connection to the previously implanted generator/battery.  Risks, benefits, alternative therapies were discussed with the patient, including but not limited to infection and bleeding (intracranial), injury to the brain, stroke, seizure, death, hardware failure and possible need for more surgeries.  Surgical procedure was discussed in detail.  All questions were answered, and she expressed understanding.      DESCRIPTION OF PROCEDURE: The patient was brought to the operating room and was laid supine on the operating table.  The patient was identified by me and with placement of endotracheal tube, general anesthesia was obtained.  Her right chest area as well as the right neck and right parietal region of the head was visualized as her head was turned to the left, and a hair clipper was used to remove some hair over the palpable wire connector portion in her right parietal area from her phase I surgery.  The area mentioned above were then prepped and draped in routine surgical fashion.  A time out was performed confirming the patient's identity, procedure to be performed and the administration of  preoperative prophylactic antibiotic.    At this time, a total of 26 mL of 1% Lidocaine with epinephrine and 1/4% Marcaine plain, 50:50 mix, was injected into the intended incision sites, one over the right chest wall, several centimeters below the clavicle and one over the right parietal area, as well as in the area of the intended tunneling for the extension wires.    Attention was turned to the head.  A #10 blade scalpel was used to make a 3 cm skin incision at the distal end of the connector that was palpated in her scalp.  Hemostasis was achieved with bipolar cautery.  The incision was carried down to the pericranium.  The mosquito was used to hold the protective cover, and we gently pulled out the connector.  This was found to be fully intact.  At that point, a pocket was made using a Tatianna clamp down toward behind the ear into the nuchal line.  This was in preparation for tunneling of the extension wires.  Another pocket was also created superiorly and posteriorly for the placement of the second extension wire head.    Attention was turned to the right chest wall area.  A #10 blade scalpel was used to make a 5 cm incision on the right chest wall.  The #10 blade was used to finish dissection down to the proper plane.  We found a nice dissecting plane superficial to the pectoralis muscle.  A combination of blunt and sharp dissection technique was used to establish a subcutaneous pocket about 3 fingerbreadths wide and deep enough to encompass the generator/battery.  Hemostasis was achieved with bipolar cautery.  The pocket was copiously irrigated and one sponge was placed in the pocket.    At this point, a tunneler was brought onto the field.  A blunt tip was attached and a subcutaneous passage was tunneled from the head incision to the chest wall incision, careful not to be too superficial to the skin, but within the correct plane and taking a course over the clavicle.  The tip was then removed and the double  extension tip maxwell was attached.  The left and right side extension wires were placed onto the tunneler and pulled through subcutaneously to the head incision.    The protective covering was removed from the connector portion of the right sided intracranial electrode.  The contacts were inspected to be clean and without damage.  We then proceeded to make the connection between the right intracranial lead and the predesignated extension wire intended for the future left intracranial lead.  At this time, a new Percept RC generator/battery was brought onto the field.  The right extension wire was connected to the generator/battery at the inferior portal and secured with a screw .  The left extension wire was connected to the generator/battery at the superior portal and secured with a screw .  The generator/battery was placed into the pocket for testing after the sponge was taken out.  Impedance values were found to be within normal limits.  The right sided intracranial lead was disconnected from the extension wire intended for the future left intracranial lead.  The left extension wire remained connected to the generator/battery at the superior portal.  The connector portion for the future left side intracranial lead was plugged with a dead end plug and secured with a screw .  Then, the connector was placed within the pocket in the posterior pocket that was just created.  The extension wire was pulled from the chest incision until the connector was within the head incision and there were no excess wire.  Then, the right intracranial electrode was inserted into the assigned right sided extension wire.  The connection was secured with a screwdriver.  Impedance values were found to be within normal limits.  Then, the right side extension wire was pulled from the chest incision, and the excess wire length towards the head was also buried superiorly until the connector was within the incision.  Then, the  connector was buried slightly superiorly in the incision until it was no longer directly under the incision.  Again, the future connection for the left side is buried posteriorly and superiorly with respect to the right side extension wire connection.    Therefore, right side intracranial electrode, one electrode array, was connected to the extension wire and to the generator/battery.  The future left side extension wire was tested to be without any problems and is connected to the generator/battery.  The impedance values were noted to be within normal and both extension wires were noted to be working properly.    The generator/battery was again taken out of the pocket.  Hemostasis of the pocket was performed with bipolar cautery.  The area was generously irrigated and dried.  Then, the new generator/battery with the excess wires being placed behind and at the periphery of the generator/battery were placed into the right chest wall pocket.  The entire apparatus fit into the pocket comfortably.  The generator/battery was anchored to the pocket using two 2-0 Ethibond sutures.     The system was tested wirelessly again.  All the impedance values of the right intracranial lead and extension wire was within normal limits.  No problems were found with the system.    With the satisfactory placement of the system, we began closing the wound.  The right chest incision was closed in the following manner.  The deep pocket was reapproximated using 3-0 Vicryl sutures in an inverted interrupted fashion.  The subcutaneous fat layer was reapproximated using 3-0 Vicryl sutures in an inverted interrupted fashion.  The dermal layer was reapproximated using 3-0 Vicryl sutures in an inverted interrupted fashion.  The skin was reapproximated using 4-0 Monocryl suture in a subcuticular fashion.  The right parietal head incision was irrigated and dried.  Meticulous hemostasis was obtained.  It was then closed in the following manner.  The  galea was reapproximated over the implanted hardware using 3-0 Vicryl sutures in an inverted interrupted fashion.  This provided a protective layer.  The skin was then reapproximated using 4-0 Monocryl suture in a running fashion.  Both wounds were cleaned and dried.  ChoraPrep was used to clean the incisions again.  Then, Dermabond skin glue was applied.    At the end of the case, all counts including needle, sponge and instrument counts were correct x 2.  There were no complications.  Patient was extubated and taken to the recovery room in a stable condition.    As a neurosurgery attending, Brynn RENTERIA M.D., Ph.D., was present and scrubbed for the entire case and performed the key and critical portions of the case.      BRYNN ANDUJAR MD, PHD

## 2025-06-02 DIAGNOSIS — Z01.818 PREOPERATIVE EVALUATION TO RULE OUT SURGICAL CONTRAINDICATION: Primary | ICD-10-CM

## 2025-06-02 DIAGNOSIS — R79.1 ABNORMAL COAGULATION PROFILE: ICD-10-CM

## 2025-06-09 ENCOUNTER — OFFICE VISIT (OUTPATIENT)
Dept: NEUROSURGERY | Facility: CLINIC | Age: 65
End: 2025-06-09
Payer: COMMERCIAL

## 2025-06-09 ENCOUNTER — LAB (OUTPATIENT)
Dept: LAB | Facility: CLINIC | Age: 65
End: 2025-06-09
Payer: COMMERCIAL

## 2025-06-09 VITALS
SYSTOLIC BLOOD PRESSURE: 115 MMHG | OXYGEN SATURATION: 96 % | DIASTOLIC BLOOD PRESSURE: 75 MMHG | RESPIRATION RATE: 16 BRPM | HEART RATE: 96 BPM

## 2025-06-09 DIAGNOSIS — R79.1 ABNORMAL COAGULATION PROFILE: ICD-10-CM

## 2025-06-09 DIAGNOSIS — Z96.89 STATUS POST DEEP BRAIN STIMULATOR PLACEMENT: ICD-10-CM

## 2025-06-09 DIAGNOSIS — Z01.818 PREOPERATIVE EVALUATION TO RULE OUT SURGICAL CONTRAINDICATION: ICD-10-CM

## 2025-06-09 DIAGNOSIS — G25.0 ESSENTIAL TREMOR: Primary | ICD-10-CM

## 2025-06-09 LAB
ALBUMIN UR-MCNC: 10 MG/DL
ANION GAP SERPL CALCULATED.3IONS-SCNC: 13 MMOL/L (ref 7–15)
APPEARANCE UR: CLEAR
APTT PPP: 26 SECONDS (ref 22–38)
BILIRUB UR QL STRIP: NEGATIVE
BUN SERPL-MCNC: 16.9 MG/DL (ref 8–23)
CALCIUM SERPL-MCNC: 10 MG/DL (ref 8.8–10.4)
CHLORIDE SERPL-SCNC: 103 MMOL/L (ref 98–107)
COLOR UR AUTO: YELLOW
CREAT SERPL-MCNC: 0.85 MG/DL (ref 0.51–0.95)
EGFRCR SERPLBLD CKD-EPI 2021: 76 ML/MIN/1.73M2
ERYTHROCYTE [DISTWIDTH] IN BLOOD BY AUTOMATED COUNT: 12.4 % (ref 10–15)
GLUCOSE SERPL-MCNC: 117 MG/DL (ref 70–99)
GLUCOSE UR STRIP-MCNC: NEGATIVE MG/DL
HCO3 SERPL-SCNC: 24 MMOL/L (ref 22–29)
HCT VFR BLD AUTO: 46.9 % (ref 35–47)
HGB BLD-MCNC: 15.1 G/DL (ref 11.7–15.7)
HGB UR QL STRIP: NEGATIVE
HYALINE CASTS: 34 /LPF
INR PPP: 0.98 (ref 0.85–1.15)
KETONES UR STRIP-MCNC: NEGATIVE MG/DL
LEUKOCYTE ESTERASE UR QL STRIP: ABNORMAL
MCH RBC QN AUTO: 29 PG (ref 26.5–33)
MCHC RBC AUTO-ENTMCNC: 32.2 G/DL (ref 31.5–36.5)
MCV RBC AUTO: 90 FL (ref 78–100)
MUCOUS THREADS #/AREA URNS LPF: PRESENT /LPF
NITRATE UR QL: NEGATIVE
PH UR STRIP: 5.5 [PH] (ref 5–7)
PLATELET # BLD AUTO: 443 10E3/UL (ref 150–450)
POTASSIUM SERPL-SCNC: 4 MMOL/L (ref 3.4–5.3)
PROTHROMBIN TIME: 13.2 SECONDS (ref 11.8–14.8)
RBC # BLD AUTO: 5.21 10E6/UL (ref 3.8–5.2)
RBC URINE: 1 /HPF
SODIUM SERPL-SCNC: 140 MMOL/L (ref 135–145)
SP GR UR STRIP: 1.03 (ref 1–1.03)
SQUAMOUS EPITHELIAL: 4 /HPF
UROBILINOGEN UR STRIP-MCNC: 2 MG/DL
WBC # BLD AUTO: 9.1 10E3/UL (ref 4–11)
WBC URINE: 12 /HPF

## 2025-06-09 PROCEDURE — 87086 URINE CULTURE/COLONY COUNT: CPT | Performed by: NEUROLOGICAL SURGERY

## 2025-06-09 PROCEDURE — 85610 PROTHROMBIN TIME: CPT | Performed by: PATHOLOGY

## 2025-06-09 PROCEDURE — 85027 COMPLETE CBC AUTOMATED: CPT | Performed by: PATHOLOGY

## 2025-06-09 PROCEDURE — 85730 THROMBOPLASTIN TIME PARTIAL: CPT | Performed by: PATHOLOGY

## 2025-06-09 PROCEDURE — 99024 POSTOP FOLLOW-UP VISIT: CPT | Performed by: NEUROLOGICAL SURGERY

## 2025-06-09 PROCEDURE — 81001 URINALYSIS AUTO W/SCOPE: CPT | Performed by: PATHOLOGY

## 2025-06-09 PROCEDURE — 36415 COLL VENOUS BLD VENIPUNCTURE: CPT | Performed by: PATHOLOGY

## 2025-06-09 PROCEDURE — 80048 BASIC METABOLIC PNL TOTAL CA: CPT | Performed by: PATHOLOGY

## 2025-06-09 PROCEDURE — 99000 SPECIMEN HANDLING OFFICE-LAB: CPT | Performed by: PATHOLOGY

## 2025-06-09 RX ORDER — MULTIVITAMIN
1 TABLET ORAL EVERY MORNING
COMMUNITY
Start: 2024-03-06

## 2025-06-09 NOTE — PROGRESS NOTES
NEUROSURGERY    HISTORY AND PHYSICAL EXAM    Chief Complaint   Patient presents with    Follow Up     Wound check.  Essential tremor.  S/p right side deep brain stimulator placement, phase I, placement of right side deep brain stimulator electrode, target right ventral intermediate nucleus of the thalamus with microelectrode recording on 5/13/2025 and s/p deep brain stimulator placement, phase II, placement of deep brain stimulator generator/battery over the right chest wall on 5/22/2025.       HISTORY OF PRESENT ILLNESS  Ms. Aida Zhong returns today for her follow-up and her wound check.  She is s/p right side deep brain stimulator placement, phase I, placement of right side deep brain stimulator electrode, target right ventral intermediate nucleus of the thalamus with microelectrode recording on 5/13/2025 and s/p deep brain stimulator placement, phase II, placement of deep brain stimulator generator/battery over the right chest wall on 5/22/2025.  Patient tolerated both of the procedures well and there were no complications.  After her phase I surgery, patient's postoperative CT head was without any concerning findings.  She was discharged to home on POD#1.  She did well with her phase II surgery.  Patient reports that she is doing well and there are no complaints.  She reports no pain other than some tenderness at the right parietal area.  She denies any fevers, chills, vomiting, dizziness, weakness, numbness or seizure like activity.  She did have nausea with oxycodone.  She reports that the incisions are looking good and there is no redness, no drainage and no fluid collection.  Her scalp, however, were very itchy.  Overall, she is quite happy with the recent surgery.  Her left hand tremor is reduced still.     In summary, Ms. Aida Zhong is a 65 year old left handed female with a diagnosis of essential tremor.  Patient states that her tremor began when she was 13 years old.  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  She was ultimately diagnosed with essential tremor in 2014.  Her tremor has progressed and it is now interfering with drinking and eating.  She has seen Dr. Moon who is recommending DBS workup.  Patient states that she is very familiar with DBS and she has been \"following\" the therapy and its development for quite some time.  Her goal for DBS is to better control her tremor, to be able to eat without spilling and to put makeup on without poking herself in the eye.  She also would like to be able to play with her grandchildren without having tremor.  Her tremor is bilateral and she would like her left side treated first.  She would like a rechargeable generator/battery and her first choice is [employer], followed by [employer] being the second choice.  We discussed the potential outcome of the DBS candidacy evaluation.  Upon completion of the evaluation, patient's case will be dicussed at the Movement Disorder Consensus Group Meeting.  Patient may not be considered to be a good candidate.  If that is the case, the group will provide a reason for our recommendation against DBS.  Patient may also be considered to be a good candidate and wait and see strategy may be recommended.  Patient may also be considered to be a good candidate and staged bilateral strategy may be recommended.  We discussed both phase I and II of DBS surgery.  We discussed that during phase I, we would place the DBS electrode on the right side under MAC and microelectrode recording.  She would then return one week later for the phase II with placement of the DBS generator/battery over the right chest wall under general anesthesia.  If she is a unilateral candidate or wait and see strategy candidate, then she will undergo another evaluation/discussion prior to proceeding to the left side implantation.  If she is a " bilateral candidate in a staged fashion, she would return three weeks later for the phase I and II combined for the placement of the DBS electrode on the left side under MAC and microelectrode recording followed by connection to the previously implanted generator/battery.  Risks, benefits, alternative therapies were discussed with the patient, including but not limited to infection and bleeding (intracranial), injury to the brain, stroke, seizure, death, hardware failure and possible need for more surgeries.  Surgical procedure was discussed in detail.  All questions were answered, and she expressed understanding.  Her case was discussed at the Movement Disorder Consensus Group Meeting and she was considered to be a good candidate for DBS surgery.  Recommendation was staged bilateral protocol, right side implantation first, target right then left Vim, and [employer] device with rechargeable generator/battery over the right chest wall.        Past Medical History:   Diagnosis Date    Depression     Essential tremor     HLD (hyperlipidemia)        Past Surgical History:   Procedure Laterality Date    ABDOMINOPLASTY      BROW LIFT      DILATION AND CURETTAGE      HC ARTHROSCOPIC REMOVAL GANGLION CYST-WRIST/HAND Right     HC ENLARGE BREAST WITH IMPLANT      HC REMOVAL OF BREAST IMPLANT      HYSTERECTOMY      IMPLANT DEEP BRAIN STIMULATION GENERATOR / BATTERY Right 5/22/2025    Procedure: Deep brain stimulator placement, phase II, placement of deep brain stimulator generator/battery over the right chest wall;  Surgeon: Hermann Schumacher MD;  Location: [facility] OR    OPTICAL TRACKING SYSTEM INSERTION DEEP BRAIN STIMULATION Right 5/13/2025    Procedure: Stealth Right side deep brain stimulator placement, phase I, placement of right side deep brain stimulator electrode, target right ventral intermediate nucleus of the thalamus, with microelectrode recording;  Surgeon: Hermann Schumacher MD;  Location: [facility] OR     TONSILLECTOMY         Social History     Socioeconomic History    Marital status:      Spouse name: Not on file    Number of children: Not on file    Years of education: Not on file    Highest education level: Not on file   Occupational History    Not on file   Tobacco Use    Smoking status: Never    Smokeless tobacco: Never   Vaping Use    Vaping status: Never Used   Substance and Sexual Activity    Alcohol use: Not Currently    Drug use: Never    Sexual activity: Not on file   Other Topics Concern    Not on file   Social History Narrative    Not on file     Social Drivers of Health     Financial Resource Strain: Low Risk  (5/13/2025)    Financial Resource Strain     Within the past 12 months, have you or your family members you live with been unable to get utilities (heat, electricity) when it was really needed?: No   Recent Concern: Financial Resource Strain - Medium Risk (3/28/2025)    Received from [facility]    Financial Resource Strain     Difficulty of Paying Living Expenses: 2     Difficulty of Paying Living Expenses: 1   Food Insecurity: High Risk (5/13/2025)    Food Insecurity     Within the past 12 months, did you worry that your food would run out before you got money to buy more?: Yes     Within the past 12 months, did the food you bought just not last and you didn t have money to get more?: Yes   Transportation Needs: High Risk (5/13/2025)    Transportation Needs     Within the past 12 months, has lack of transportation kept you from medical appointments, getting your medicines, non-medical meetings or appointments, work, or from getting things that you need?: Yes   Physical Activity: Not on file   Stress: Not on file   Social Connections: Socially Integrated (3/28/2025)    Received from [facility]    Social Connections     Do you often feel lonely or isolated from those around you?: 0   Interpersonal Safety: Low Risk   (5/22/2025)    Interpersonal Safety     Do you feel physically and emotionally safe where you currently live?: Yes     Within the past 12 months, have you been hit, slapped, kicked or otherwise physically hurt by someone?: No     Within the past 12 months, have you been humiliated or emotionally abused in other ways by your partner or ex-partner?: No   Housing Stability: Low Risk  (5/13/2025)    Housing Stability     Do you have housing? : Yes     Are you worried about losing your housing?: No   Recent Concern: Housing Stability - Medium Risk (3/28/2025)    Received from [facility]    Housing Stability     What is your housing situation today?: 2       Family History   Problem Relation Age of Onset    Tremor Mother     Tremor Sister     Tremor Maternal Grandfather     Tremor Daughter     Tremor Daughter     Anesthesia Reaction No family hx of     Deep Vein Thrombosis (DVT) No family hx of        Current Outpatient Medications   Medication Sig Dispense Refill    FLUoxetine (PROZAC) 20 MG capsule Take 20 mg by mouth at bedtime.      FLUoxetine (PROZAC) 40 MG capsule Take 40 mg by mouth at bedtime.      methylphenidate (RITALIN) 10 MG tablet Take 15 mg by mouth 2 times daily. Takes at 0700 and 1200      Multiple Vitamins-Minerals (EQ MULTIVITAMINS ADULT GUMMY PO) Take 2 chew tab by mouth daily.      multivitamin w/minerals (MULTI-VITAMIN) tablet Take 1 tablet by mouth every morning.      rosuvastatin (CRESTOR) 5 MG tablet Take 1 tablet by mouth at bedtime       No current facility-administered medications for this visit.       Allergies   Allergen Reactions    Penicillins Rash       REVIEW OF SYSTEMS: Also per HPI.  General: Negative for chills/sweats/fever. difficulty sleeping, headache, recent fatigue, or weight gain/loss.  Eyes: Negative for blurred vision, crossed eyes, double vision, recent eye infections, vision flashes, or vision halos.  Ears/Nose/Mouth/Throat: Negative for  bleeding gums, difficulty swallowing, earache, ear discharge, hearing loss, hoarseness, nosebleeds, tinnitus, or sinus problems.  Respiratory: POSITIVE for ELVIA.  Negative for chronic cough, coughing blood, night sweats, shortness of breath, Tuberculosis, or wheezing.  Cardiovascular: Negative for chest pain, dyspnea at night, heart murmur, palpitations, pacemaker, pacemaker, poor circulation, swollen legs/feet, or varicose veins.  Gastrointestinal: Negative for melena, hematochezia, chronic diarrhea, heartburn, Hepatitis A/B/C, increasing constipation, Liver Disease, nausea, or vomiting.   Genitourinary: Negative for Urinary retention, genital discharge, urinary incontinence, urgency, or UTI.   Neurological: POSITIVE for essential tremor.  Negative for syncope, headaches, numbness of arms/legs, tingling in hands/arms/legs, memory problems, or seizures.  Psychological: POSITIVE for depression. Negative for anxiety, panic attacks, or restlessness.  Skin: Negative for chronic skin itching, color changes in hand/feet when cold, poor scarring, non-healing ulcers, skin rashes/hives, unusual moles.  Musculoskeletal: Negative for arthritis, joint swelling in hands/wrists/hips/knees/joints, muscle tenderness in arms/legs, or osteoporosis.  Endocrine: Negative for excessive thirst/hunger, intolerance for warm rooms, loss of libido, multiple broken bones, rapid weight gain/loss, galactorrhea, or thyroid issues.  Hematologic/Lymphatic: Negative for easy skin bruising, significant fatigue, prolonged bleeding, tender glands/lymph nodes.  Allergies: Negative for asthma or hay fever.      PHYSICAL EXAM  /75 (BP Location: Right arm, Patient Position: Sitting)   Pulse 96   Resp 16   SpO2 96%     General: Awake, alert, oriented. Well nourished, well developed, no acute distress.  HEENT: Head normocephalic, atraumatic. No carotid bruit. Neck supple. Good range of motion. No palpable thyroid mass.  Heart: Regular rhythm and  rate. No murmurs.  Lungs: Clear to auscultation and percussion bilaterally. No rhonchi, rales or wheeze.  Abdomen: Soft, non-tender, non-distended. No hepatosplenomegaly.  Extremity: Warm with no clubbing or cyanosis. No lower extremity edema.  Incisions:   Right frontal incision is healing well.  No redness.  No drainage.  No fluid collection.  Remaining Monocryl sutures were removed without any difficulty.  Right parietal incision is healing well.  No redness.  No drainage.  No fluid collection.  Remaining Monocryl sutures were removed without any difficulty.  Right chest wall incision is healing well.  No redness.  No drainage.  No fluid collection.    Neurological:  Awake, alert and oriented to date, time, place and person. Speech fluent.   Pupils equal, round, reactive to light.  Extraocular movement intact.  Visual fields are full on confrontation.  Hearing is grossly normal to finger rub.   Facial sensation intact.  Face symmetric.  Tongue midline.  Uvula elevates equally.    Motor: full strength throughout.  Sensation: intact to light touch and pinpoint.      ASSESSMENT   65 year old left handed female  Essential tremor  Bilateral tremor, right worse than left  Status post right side deep brain stimulator placement, phase I, placement of right side deep brain stimulator electrode, target right ventral intermediate nucleus of the thalamus with microelectrode recording on 5/13/2025  Status post deep brain stimulator placement, phase II, placement of deep brain stimulator generator/battery over the right chest wall on 5/22/2025    Patient is recovering well from the recent DBS implantation surgeries.  Her incisions are healing well with no signs of infection.  She is doing well overall.      Patient had several questions with respect to her DBS.    Is it ok for her to have/use behind-the-ear hearing aids?  It would be ok.  There shouldn't be any problems/issues.    Should she get a medical alert bracelet?  This  is not necessary but it wouldn't be a bad idea    Any issues with future dental procedures?  The literature is mixed on the use of prophylactic antibiotic use prior/after the dental procedure.  Other than that, there are on contraindications to any dental procedures, such as extractions or dental implants.    Patient usually does neck manipulation and stretching - will it snap the extension wire?  No.  The extension wires are strong.    Patient's scalp is itchy.  It should improve after the removal of the remaining Monocryl sutures and remaining glue dressing.    Patient will need a return to work note.  We will wait until after the second side surgery.    In the OR, she noted some of the [facility] personnel shrugging their shoulders and acting like they did not know what they were doing.  I reassured her that her TAZ procedure went smoothly and that she is misinterpreting the gestures from the mapping personnel.  They may have made the gestures when they did not see the side effect they were looking for, which led to going up on the stimulation parameter.  There were no concerns during the TAZ.  Patient expressed understanding.    Since she is a bilateral candidate in a staged fashion, she is scheduled to return on 6/20/2025 for the phase I and II combined for the placement of the DBS electrode on the left side under MAC and microelectrode recording followed by connection to the previously implanted generator/battery.      Risks, benefits, alternative therapies were discussed with the patient, including but not limited to infection and bleeding (intracranial), injury to the brain, stroke, seizure, death, hardware failure and possible need for more surgeries.  Surgical procedure was discussed in detail.  All questions were answered, and she expressed understanding.      She will have her preop labs done today.      PLAN  1.  Left side deep brain stimulator placement, phase I and II combined, placement of the left side  deep brain stimulator electrode, target left ventral intermediate nucleus of the thalamus with microelectrode recording and connection to the existing generator/battery  2.  Preop labs today      25 minutes were spent face to face with the patient of which more than 50% of the time was spent counseling and discussing the above issues regarding diagnosis, differential, treatment options, and steps for further evaluation, as well as wound care.  3 minutes were spent reviewing patient's chart.  16 minutes were spent on documentation for this encounter.  44 minutes total were spent on this encounter today.

## 2025-06-09 NOTE — LETTER
6/9/2025       RE: Aida Zhong  [address]  [facility] [address]     Dear Colleague,    Thank you for referring your patient, Aida Zhong, to the [facility] NEUROSURGERY CLINIC [place] at [facility]. Please see a copy of my visit note below.    NEUROSURGERY    HISTORY AND PHYSICAL EXAM    Chief Complaint   Patient presents with     Follow Up     Wound check.  Essential tremor.  S/p right side deep brain stimulator placement, phase I, placement of right side deep brain stimulator electrode, target right ventral intermediate nucleus of the thalamus with microelectrode recording on 5/13/2025 and s/p deep brain stimulator placement, phase II, placement of deep brain stimulator generator/battery over the right chest wall on 5/22/2025.       HISTORY OF PRESENT ILLNESS  Ms. Aida Zhong returns today for her follow-up and her wound check.  She is s/p right side deep brain stimulator placement, phase I, placement of right side deep brain stimulator electrode, target right ventral intermediate nucleus of the thalamus with microelectrode recording on 5/13/2025 and s/p deep brain stimulator placement, phase II, placement of deep brain stimulator generator/battery over the right chest wall on 5/22/2025.  Patient tolerated both of the procedures well and there were no complications.  After her phase I surgery, patient's postoperative CT head was without any concerning findings.  She was discharged to home on POD#1.  She did well with her phase II surgery.  Patient reports that she is doing well and there are no complaints.  She reports no pain other than some tenderness at the right parietal area.  She denies any fevers, chills, vomiting, dizziness, weakness, numbness or seizure like activity.  She did have nausea with oxycodone.  She reports that the incisions are looking good and there is no redness, no drainage and no fluid collection.  Her scalp,  "however, were very itchy.  Overall, she is quite happy with the recent surgery.  Her left hand tremor is reduced still.     In summary, Ms. Aida Zhong is a 65 year old left handed female with a diagnosis of essential tremor.  Patient states that her tremor began when she was 13 years old.  She was at [employer] and people noted her tremor when she was lighting a candle.  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  She was ultimately diagnosed with essential tremor in 2014.  Her tremor has progressed and it is now interfering with drinking and eating.  She has seen Dr. Moon who is recommending DBS workup.  Patient states that she is very familiar with DBS and she has been \"following\" the therapy and its development for quite some time.  Her goal for DBS is to better control her tremor, to be able to eat without spilling and to put makeup on without poking herself in the eye.  She also would like to be able to play with her grandchildren without having tremor.  Her tremor is bilateral and she would like her left side treated first.  She would like a rechargeable generator/battery and her first choice is [employer], followed by [employer] being the second choice.  We discussed the potential outcome of the DBS candidacy evaluation.  Upon completion of the evaluation, patient's case will be dicussed at the Movement Disorder Consensus Group Meeting.  Patient may not be considered to be a good candidate.  If that is the case, the group will provide a reason for our recommendation against DBS.  Patient may also be considered to be a good candidate and wait and see strategy may be recommended.  Patient may also be considered to be a good candidate and staged bilateral strategy may be recommended.  We discussed both phase I and II of DBS surgery.  We discussed that during phase I, we would place the DBS electrode on the right side under MAC and microelectrode recording.  She would " then return one week later for the phase II with placement of the DBS generator/battery over the right chest wall under general anesthesia.  If she is a unilateral candidate or wait and see strategy candidate, then she will undergo another evaluation/discussion prior to proceeding to the left side implantation.  If she is a bilateral candidate in a staged fashion, she would return three weeks later for the phase I and II combined for the placement of the DBS electrode on the left side under MAC and microelectrode recording followed by connection to the previously implanted generator/battery.  Risks, benefits, alternative therapies were discussed with the patient, including but not limited to infection and bleeding (intracranial), injury to the brain, stroke, seizure, death, hardware failure and possible need for more surgeries.  Surgical procedure was discussed in detail.  All questions were answered, and she expressed understanding.  Her case was discussed at the Movement Disorder Consensus Group Meeting and she was considered to be a good candidate for DBS surgery.  Recommendation was staged bilateral protocol, right side implantation first, target right then left Vim, and [employer] device with rechargeable generator/battery over the right chest wall.        Past Medical History:   Diagnosis Date     Depression      Essential tremor      HLD (hyperlipidemia)        Past Surgical History:   Procedure Laterality Date     ABDOMINOPLASTY       BROW LIFT       DILATION AND CURETTAGE       HC ARTHROSCOPIC REMOVAL GANGLION CYST-WRIST/HAND Right      HC ENLARGE BREAST WITH IMPLANT       HC REMOVAL OF BREAST IMPLANT       HYSTERECTOMY       IMPLANT DEEP BRAIN STIMULATION GENERATOR / BATTERY Right 5/22/2025    Procedure: Deep brain stimulator placement, phase II, placement of deep brain stimulator generator/battery over the right chest wall;  Surgeon: Hermann Schumacher MD;  Location:  OR     OPTICAL TRACKING SYSTEM  INSERTION DEEP BRAIN STIMULATION Right 5/13/2025    Procedure: Stealth Right side deep brain stimulator placement, phase I, placement of right side deep brain stimulator electrode, target right ventral intermediate nucleus of the thalamus, with microelectrode recording;  Surgeon: Hermann Schumacher MD;  Location: [facility] OR     TONSILLECTOMY         Social History     Socioeconomic History     Marital status:      Spouse name: Not on file     Number of children: Not on file     Years of education: Not on file     Highest education level: Not on file   Occupational History     Not on file   Tobacco Use     Smoking status: Never     Smokeless tobacco: Never   Vaping Use     Vaping status: Never Used   Substance and Sexual Activity     Alcohol use: Not Currently     Drug use: Never     Sexual activity: Not on file   Other Topics Concern     Not on file   Social History Narrative     Not on file     Social Drivers of Health     Financial Resource Strain: Low Risk  (5/13/2025)    Financial Resource Strain      Within the past 12 months, have you or your family members you live with been unable to get utilities (heat, electricity) when it was really needed?: No   Recent Concern: Financial Resource Strain - Medium Risk (3/28/2025)    [facility]    Financial Resource Strain      Difficulty of Paying Living Expenses: 2      Difficulty of Paying Living Expenses: 1   Food Insecurity: High Risk (5/13/2025)    Food Insecurity      Within the past 12 months, did you worry that your food would run out before you got money to buy more?: Yes      Within the past 12 months, did the food you bought just not last and you didn t have money to get more?: Yes   Transportation Needs: High Risk (5/13/2025)    Transportation Needs      Within the past 12 months, has lack of transportation kept you from medical appointments, getting your medicines, non-medical meetings or appointments,  work, or from getting things that you need?: Yes   Physical Activity: Not on file   Stress: Not on file   Social Connections: Socially Integrated (3/28/2025)    Received from [employer] [facility]    Social Connections      Do you often feel lonely or isolated from those around you?: 0   Interpersonal Safety: Low Risk  (5/22/2025)    Interpersonal Safety      Do you feel physically and emotionally safe where you currently live?: Yes      Within the past 12 months, have you been hit, slapped, kicked or otherwise physically hurt by someone?: No      Within the past 12 months, have you been humiliated or emotionally abused in other ways by your partner or ex-partner?: No   Housing Stability: Low Risk  (5/13/2025)    Housing Stability      Do you have housing? : Yes      Are you worried about losing your housing?: No   Recent Concern: Housing Stability - Medium Risk (3/28/2025)    Received from [employer] [facility]    Housing Stability      What is your housing situation today?: 2       Family History   Problem Relation Age of Onset     Tremor Mother      Tremor Sister      Tremor Maternal Grandfather      Tremor Daughter      Tremor Daughter      Anesthesia Reaction No family hx of      Deep Vein Thrombosis (DVT) No family hx of        Current Outpatient Medications   Medication Sig Dispense Refill     FLUoxetine (PROZAC) 20 MG capsule Take 20 mg by mouth at bedtime.       FLUoxetine (PROZAC) 40 MG capsule Take 40 mg by mouth at bedtime.       methylphenidate (RITALIN) 10 MG tablet Take 15 mg by mouth 2 times daily. Takes at 0700 and 1200       Multiple Vitamins-Minerals (EQ MULTIVITAMINS ADULT GUMMY PO) Take 2 chew tab by mouth daily.       multivitamin w/minerals (MULTI-VITAMIN) tablet Take 1 tablet by mouth every morning.       rosuvastatin (CRESTOR) 5 MG tablet Take 1 tablet by mouth at bedtime       No current facility-administered medications for this visit.        Allergies   Allergen Reactions     Penicillins Rash       REVIEW OF SYSTEMS: Also per HPI.  General: Negative for chills/sweats/fever. difficulty sleeping, headache, recent fatigue, or weight gain/loss.  Eyes: Negative for blurred vision, crossed eyes, double vision, recent eye infections, vision flashes, or vision halos.  Ears/Nose/Mouth/Throat: Negative for bleeding gums, difficulty swallowing, earache, ear discharge, hearing loss, hoarseness, nosebleeds, tinnitus, or sinus problems.  Respiratory: POSITIVE for ELVIA.  Negative for chronic cough, coughing blood, night sweats, shortness of breath, Tuberculosis, or wheezing.  Cardiovascular: Negative for chest pain, dyspnea at night, heart murmur, palpitations, pacemaker, pacemaker, poor circulation, swollen legs/feet, or varicose veins.  Gastrointestinal: Negative for melena, hematochezia, chronic diarrhea, heartburn, Hepatitis A/B/C, increasing constipation, Liver Disease, nausea, or vomiting.   Genitourinary: Negative for Urinary retention, genital discharge, urinary incontinence, urgency, or UTI.   Neurological: POSITIVE for essential tremor.  Negative for syncope, headaches, numbness of arms/legs, tingling in hands/arms/legs, memory problems, or seizures.  Psychological: POSITIVE for depression. Negative for anxiety, panic attacks, or restlessness.  Skin: Negative for chronic skin itching, color changes in hand/feet when cold, poor scarring, non-healing ulcers, skin rashes/hives, unusual moles.  Musculoskeletal: Negative for arthritis, joint swelling in hands/wrists/hips/knees/joints, muscle tenderness in arms/legs, or osteoporosis.  Endocrine: Negative for excessive thirst/hunger, intolerance for warm rooms, loss of libido, multiple broken bones, rapid weight gain/loss, galactorrhea, or thyroid issues.  Hematologic/Lymphatic: Negative for easy skin bruising, significant fatigue, prolonged bleeding, tender glands/lymph nodes.  Allergies: Negative for asthma  or hay fever.      PHYSICAL EXAM  /75 (BP Location: Right arm, Patient Position: Sitting)   Pulse 96   Resp 16   SpO2 96%     General: Awake, alert, oriented. Well nourished, well developed, no acute distress.  HEENT: Head normocephalic, atraumatic. No carotid bruit. Neck supple. Good range of motion. No palpable thyroid mass.  Heart: Regular rhythm and rate. No murmurs.  Lungs: Clear to auscultation and percussion bilaterally. No rhonchi, rales or wheeze.  Abdomen: Soft, non-tender, non-distended. No hepatosplenomegaly.  Extremity: Warm with no clubbing or cyanosis. No lower extremity edema.  Incisions:   Right frontal incision is healing well.  No redness.  No drainage.  No fluid collection.  Remaining Monocryl sutures were removed without any difficulty.  Right parietal incision is healing well.  No redness.  No drainage.  No fluid collection.  Remaining Monocryl sutures were removed without any difficulty.  Right chest wall incision is healing well.  No redness.  No drainage.  No fluid collection.    Neurological:  Awake, alert and oriented to date, time, place and person. Speech fluent.   Pupils equal, round, reactive to light.  Extraocular movement intact.  Visual fields are full on confrontation.  Hearing is grossly normal to finger rub.   Facial sensation intact.  Face symmetric.  Tongue midline.  Uvula elevates equally.    Motor: full strength throughout.  Sensation: intact to light touch and pinpoint.      ASSESSMENT   65 year old left handed female  Essential tremor  Bilateral tremor, right worse than left  Status post right side deep brain stimulator placement, phase I, placement of right side deep brain stimulator electrode, target right ventral intermediate nucleus of the thalamus with microelectrode recording on 5/13/2025  Status post deep brain stimulator placement, phase II, placement of deep brain stimulator generator/battery over the right chest wall on 5/22/2025    Patient is recovering  well from the recent DBS implantation surgeries.  Her incisions are healing well with no signs of infection.  She is doing well overall.      Patient had several questions with respect to her DBS.    Is it ok for her to have/use behind-the-ear hearing aids?  It would be ok.  There shouldn't be any problems/issues.    Should she get a medical alert bracelet?  This is not necessary but it wouldn't be a bad idea    Any issues with future dental procedures?  The literature is mixed on the use of prophylactic antibiotic use prior/after the dental procedure.  Other than that, there are on contraindications to any dental procedures, such as extractions or dental implants.    Patient usually does neck manipulation and stretching - will it snap the extension wire?  No.  The extension wires are strong.    Patient's scalp is itchy.  It should improve after the removal of the remaining Monocryl sutures and remaining glue dressing.    Patient will need a return to work note.  We will wait until after the second side surgery.    In the OR, she noted some of the TAZ personnel shrugging their shoulders and acting like they did not know what they were doing.  I reassured her that her TAZ procedure went smoothly and that she is misinterpreting the gestures from the mapping personnel.  They may have made the gestures when they did not see the side effect they were looking for, which led to going up on the stimulation parameter.  There were no concerns during the TAZ.  Patient expressed understanding.    Since she is a bilateral candidate in a staged fashion, she is scheduled to return on 6/20/2025 for the phase I and II combined for the placement of the DBS electrode on the left side under MAC and microelectrode recording followed by connection to the previously implanted generator/battery.      Risks, benefits, alternative therapies were discussed with the patient, including but not limited to infection and bleeding (intracranial),  injury to the brain, stroke, seizure, death, hardware failure and possible need for more surgeries.  Surgical procedure was discussed in detail.  All questions were answered, and she expressed understanding.      She will have her preop labs done today.      PLAN  1.  Left side deep brain stimulator placement, phase I and II combined, placement of the left side deep brain stimulator electrode, target left ventral intermediate nucleus of the thalamus with microelectrode recording and connection to the existing generator/battery  2.  Preop labs today      25 minutes were spent face to face with the patient of which more than 50% of the time was spent counseling and discussing the above issues regarding diagnosis, differential, treatment options, and steps for further evaluation, as well as wound care.  3 minutes were spent reviewing patient's chart.  16 minutes were spent on documentation for this encounter.  44 minutes total were spent on this encounter today.    Again, thank you for allowing me to participate in the care of your patient.      Sincerely,    Hermann Schumacher MD

## 2025-06-10 LAB — BACTERIA UR CULT: NO GROWTH

## 2025-06-18 DIAGNOSIS — G25.0 ESSENTIAL TREMOR: Primary | ICD-10-CM

## 2025-06-19 ENCOUNTER — ANESTHESIA EVENT (OUTPATIENT)
Dept: SURGERY | Facility: CLINIC | Age: 65
DRG: 027 | End: 2025-06-19
Payer: COMMERCIAL

## 2025-06-19 ASSESSMENT — ENCOUNTER SYMPTOMS: SEIZURES: 0

## 2025-06-19 ASSESSMENT — LIFESTYLE VARIABLES: TOBACCO_USE: 0

## 2025-06-19 NOTE — ANESTHESIA PREPROCEDURE EVALUATION
Anesthesia Pre-Procedure Evaluation    Patient: Aida Zhong   MRN: [id number] : 1960          Procedure : Procedure(s):  stealth assisted Left side deep brain stimulator placement, phase I & II combined, placement of left side deep brain stimulator electrode, target left ventral intermediate nucleus of the thalamus, with microelectrode recording and connection to existing generator/battery         Past Medical History:   Diagnosis Date    Depression     Essential tremor     HLD (hyperlipidemia)     PONV (postoperative nausea and vomiting)       Past Surgical History:   Procedure Laterality Date    ABDOMINOPLASTY      BROW LIFT      DILATION AND CURETTAGE      HC ARTHROSCOPIC REMOVAL GANGLION CYST-WRIST/HAND Right     HC ENLARGE BREAST WITH IMPLANT      HC REMOVAL OF BREAST IMPLANT      HYSTERECTOMY      IMPLANT DEEP BRAIN STIMULATION GENERATOR / BATTERY Right 2025    Procedure: Deep brain stimulator placement, phase II, placement of deep brain stimulator generator/battery over the right chest wall;  Surgeon: Hermann Schumacher MD;  Location: [facility] OR    OPTICAL TRACKING SYSTEM INSERTION DEEP BRAIN STIMULATION Right 2025    Procedure: Stealth Right side deep brain stimulator placement, phase I, placement of right side deep brain stimulator electrode, target right ventral intermediate nucleus of the thalamus, with microelectrode recording;  Surgeon: Hermann Schumacher MD;  Location: [facility] OR    TONSILLECTOMY        Allergies   Allergen Reactions    Penicillins Rash      Social History     Tobacco Use    Smoking status: Never    Smokeless tobacco: Never   Substance Use Topics    Alcohol use: Not Currently      Wt Readings from Last 1 Encounters:   25 70.3 kg (155 lb)        Anesthesia Evaluation   Pt has had prior anesthetic.     History of anesthetic complications  - PONV.      ROS/MED HX  ENT/Pulmonary:     (+)     ELVIA risk factors, snores loudly,                               (-) tobacco  use   Neurologic: Comment: Essential tremor   (-) no seizures and no CVA   Cardiovascular: Comment: H/o PSVT s/p ablation in 2020    (+) Dyslipidemia - -   -  - -                                 Previous cardiac testing   Echo: Date: 2020 Results:   Final Conclusion Previous Study: 06/05/19    1. Normal left ventricular systolic function. Calculated left ventricular ejection fraction   (modified Ruiz technique) is 54 %.    2. No regional wall motion abnormalities.    3. Normal right ventricular chamber size. Normal right ventricular systolic function.    4. No significant valvular heart disease.      Compared to the 6/5/2019 study, the EF has normalized.     Stress Test:  Date: Results:    ECG Reviewed:  Date: 4/2023 Results:  NSR  Cath:  Date: Results:   (-) taking anticoagulants/antiplatelets   METS/Exercise Tolerance: 4 - Raking leaves, gardening Comment: No intentional activity. Has been walking a lot for the bus, can walk a mile without PEREZ or CP    Hematologic:  - neg hematologic  ROS  (-) history of blood clots and history of blood transfusion   Musculoskeletal:  - neg musculoskeletal ROS     GI/Hepatic:  - neg GI/hepatic ROS  (-) GERD   Renal/Genitourinary:  - neg Renal ROS     Endo:  - neg endo ROS  (-) chronic steroid usage   Psychiatric/Substance Use:     (+) psychiatric history depression       Infectious Disease:  - neg infectious disease ROS     Malignancy:       Other:              Physical Exam  Airway  Mallampati: I  TM distance: >3 FB  Mouth opening: >= 4 cm    Cardiovascular - normal exam  Rhythm: regular  Rate: normal rate   Comments: H/o PSVT s/p ablation in 2020  Dental   (+) Modest Abnormalities - crowns, retainers, 1 or 2 missing teeth        Pulmonary - normal examBreath sounds clear to auscultation        Neurological   She appears oriented x3.    Other Findings       OUTSIDE LABS:  CBC:   Lab Results   Component Value Date    WBC 9.1 06/09/2025    WBC 8.7 05/14/2025    HGB 15.1  "06/09/2025    HGB 11.8 05/14/2025    HCT 46.9 06/09/2025    HCT 37.3 05/14/2025     06/09/2025     05/14/2025     BMP:   Lab Results   Component Value Date     06/09/2025     05/14/2025    POTASSIUM 4.0 06/09/2025    POTASSIUM 3.5 05/14/2025    CHLORIDE 103 06/09/2025    CHLORIDE 106 05/14/2025    CO2 24 06/09/2025    CO2 25 05/14/2025    BUN 16.9 06/09/2025    BUN 12.3 05/14/2025    CR 0.85 06/09/2025    CR 0.87 05/14/2025     (H) 06/09/2025     (H) 05/14/2025     COAGS:   Lab Results   Component Value Date    PTT 26 06/09/2025    INR 0.98 06/09/2025     POC: No results found for: \"BGM\", \"HCG\", \"HCGS\"  HEPATIC:   Lab Results   Component Value Date    ALBUMIN 4.3 08/12/2024    PROTTOTAL 6.9 08/12/2024    ALT 40 08/12/2024    AST 39 08/12/2024    ALKPHOS 135 08/12/2024    BILITOTAL 0.4 08/12/2024     OTHER:   Lab Results   Component Value Date    TROY 10.0 06/09/2025    LIPASE 74 12/25/2021    AMYLASE 53 12/13/2008    TSH 0.83 08/12/2024       Anesthesia Plan    ASA Status:  3      NPO Status: NPO Appropriate   Anesthesia Type: MAC.  Induction: intravenous.  Maintenance: Balanced.   Techniques and Equipment:       - Monitoring Plan: standard ASA monitoring     Consents    Anesthesia Plan(s) and associated risks, benefits, and realistic alternatives discussed. Questions answered and patient/representative(s) expressed understanding.     - Discussed: anesthesiologist, resident, surgeon     - Discussed with:  Patient        - Pt is DNR/DNI Status: no DNR     Blood Consent:      - Discussed with: not discussed.     Postoperative Care    Pain management: non-narcotic analgesics, plan for postoperative opioid use, multimodal analgesia.     Comments:                   Valentina Quiroga DO    I have reviewed the pertinent notes and labs in the chart from the past 30 days and (re)examined the patient.  Any updates or changes from those notes are reflected in this note.    Clinically " Significant Risk Factors Present on Admission                                 # Financial/Environmental Concerns:

## 2025-06-20 ENCOUNTER — APPOINTMENT (OUTPATIENT)
Dept: GENERAL RADIOLOGY | Facility: CLINIC | Age: 65
DRG: 027 | End: 2025-06-20
Payer: COMMERCIAL

## 2025-06-20 ENCOUNTER — HOSPITAL ENCOUNTER (INPATIENT)
Facility: CLINIC | Age: 65
LOS: 1 days | Discharge: HOME | DRG: 027 | End: 2025-06-21
Attending: NEUROLOGICAL SURGERY | Admitting: NEUROLOGICAL SURGERY
Payer: COMMERCIAL

## 2025-06-20 ENCOUNTER — APPOINTMENT (OUTPATIENT)
Dept: CT IMAGING | Facility: CLINIC | Age: 65
DRG: 027 | End: 2025-06-20
Payer: COMMERCIAL

## 2025-06-20 ENCOUNTER — HOSPITAL ENCOUNTER (OUTPATIENT)
Dept: CT IMAGING | Facility: CLINIC | Age: 65
Discharge: HOME | DRG: 027 | End: 2025-06-20
Attending: NEUROLOGICAL SURGERY | Admitting: NEUROLOGICAL SURGERY
Payer: COMMERCIAL

## 2025-06-20 ENCOUNTER — ANESTHESIA (OUTPATIENT)
Dept: SURGERY | Facility: CLINIC | Age: 65
DRG: 027 | End: 2025-06-20
Payer: COMMERCIAL

## 2025-06-20 ENCOUNTER — APPOINTMENT (OUTPATIENT)
Dept: GENERAL RADIOLOGY | Facility: CLINIC | Age: 65
DRG: 027 | End: 2025-06-20
Attending: NEUROLOGICAL SURGERY
Payer: COMMERCIAL

## 2025-06-20 DIAGNOSIS — G25.0 ESSENTIAL TREMOR: ICD-10-CM

## 2025-06-20 DIAGNOSIS — Z96.89 S/P DEEP BRAIN STIMULATOR PLACEMENT: Primary | ICD-10-CM

## 2025-06-20 DIAGNOSIS — Z98.890 POSTOPERATIVE STATE: ICD-10-CM

## 2025-06-20 LAB — GLUCOSE BLDC GLUCOMTR-MCNC: 119 MG/DL (ref 70–99)

## 2025-06-20 PROCEDURE — 250N000013 HC RX MED GY IP 250 OP 250 PS 637

## 2025-06-20 PROCEDURE — 999N000179 XR SURGERY CARM FLUORO LESS THAN 5 MIN W STILLS

## 2025-06-20 PROCEDURE — 61867 IMPLANT NEUROELECTRODE: CPT | Mod: 58 | Performed by: NEUROLOGICAL SURGERY

## 2025-06-20 PROCEDURE — 250N000011 HC RX IP 250 OP 636

## 2025-06-20 PROCEDURE — 250N000009 HC RX 250

## 2025-06-20 PROCEDURE — 70250 X-RAY EXAM OF SKULL: CPT | Mod: 26 | Performed by: RADIOLOGY

## 2025-06-20 PROCEDURE — 258N000003 HC RX IP 258 OP 636

## 2025-06-20 PROCEDURE — 999N000065 XR SKULL PORT 1/3 VIEWS

## 2025-06-20 PROCEDURE — 00H03MZ INSERTION OF NEUROSTIMULATOR LEAD INTO BRAIN, PERCUTANEOUS APPROACH: ICD-10-PCS | Performed by: NEUROLOGICAL SURGERY

## 2025-06-20 PROCEDURE — 999N000128 HC STATISTIC PERIPHERAL IV START W/O US GUIDANCE

## 2025-06-20 PROCEDURE — C1883 ADAPT/EXT, PACING/NEURO LEAD: HCPCS | Performed by: NEUROLOGICAL SURGERY

## 2025-06-20 PROCEDURE — 360N000086 HC SURGERY LEVEL 6 W/ FLUORO, PER MIN: Performed by: NEUROLOGICAL SURGERY

## 2025-06-20 PROCEDURE — 120N000003 HC R&B IMCU UMMC

## 2025-06-20 PROCEDURE — 95962 ELECTRODE STIM BRAIN ADD-ON: CPT | Mod: 26 | Performed by: PSYCHIATRY & NEUROLOGY

## 2025-06-20 PROCEDURE — 70450 CT HEAD/BRAIN W/O DYE: CPT | Mod: 77

## 2025-06-20 PROCEDURE — 370N000017 HC ANESTHESIA TECHNICAL FEE, PER MIN: Performed by: NEUROLOGICAL SURGERY

## 2025-06-20 PROCEDURE — 272N000001 HC OR GENERAL SUPPLY STERILE: Performed by: NEUROLOGICAL SURGERY

## 2025-06-20 PROCEDURE — 95961 ELECTRODE STIMULATION BRAIN: CPT | Mod: 26 | Performed by: PSYCHIATRY & NEUROLOGY

## 2025-06-20 PROCEDURE — C1787 PATIENT PROGR, NEUROSTIM: HCPCS | Performed by: NEUROLOGICAL SURGERY

## 2025-06-20 PROCEDURE — 250N000013 HC RX MED GY IP 250 OP 250 PS 637: Performed by: NEUROLOGICAL SURGERY

## 2025-06-20 PROCEDURE — 70450 CT HEAD/BRAIN W/O DYE: CPT

## 2025-06-20 PROCEDURE — 8E09XBG COMPUTER ASSISTED PROCEDURE OF HEAD AND NECK REGION, WITH COMPUTERIZED TOMOGRAPHY: ICD-10-PCS | Performed by: NEUROLOGICAL SURGERY

## 2025-06-20 PROCEDURE — 70450 CT HEAD/BRAIN W/O DYE: CPT | Mod: 26 | Performed by: RADIOLOGY

## 2025-06-20 PROCEDURE — 999N000141 HC STATISTIC PRE-PROCEDURE NURSING ASSESSMENT: Performed by: NEUROLOGICAL SURGERY

## 2025-06-20 PROCEDURE — 272N000004 HC RX 272: Performed by: NEUROLOGICAL SURGERY

## 2025-06-20 PROCEDURE — 250N000009 HC RX 250: Performed by: NEUROLOGICAL SURGERY

## 2025-06-20 PROCEDURE — 250N000011 HC RX IP 250 OP 636: Performed by: NEUROLOGICAL SURGERY

## 2025-06-20 PROCEDURE — 710N000010 HC RECOVERY PHASE 1, LEVEL 2, PER MIN: Performed by: NEUROLOGICAL SURGERY

## 2025-06-20 DEVICE — IMPLANTABLE DEVICE: Type: IMPLANTABLE DEVICE | Site: CRANIAL | Status: FUNCTIONAL

## 2025-06-20 RX ORDER — NALOXONE HYDROCHLORIDE 0.4 MG/ML
0.2 INJECTION, SOLUTION INTRAMUSCULAR; INTRAVENOUS; SUBCUTANEOUS
Status: DISCONTINUED | OUTPATIENT
Start: 2025-06-20 | End: 2025-06-21 | Stop reason: HOSPADM

## 2025-06-20 RX ORDER — HYDROMORPHONE HCL IN WATER/PF 6 MG/30 ML
0.4 PATIENT CONTROLLED ANALGESIA SYRINGE INTRAVENOUS
Status: DISCONTINUED | OUTPATIENT
Start: 2025-06-20 | End: 2025-06-21

## 2025-06-20 RX ORDER — LIDOCAINE HYDROCHLORIDE 20 MG/ML
INJECTION, SOLUTION INFILTRATION; PERINEURAL PRN
Status: DISCONTINUED | OUTPATIENT
Start: 2025-06-20 | End: 2025-06-20

## 2025-06-20 RX ORDER — POLYETHYLENE GLYCOL 3350 17 G/17G
17 POWDER, FOR SOLUTION ORAL DAILY
Status: DISCONTINUED | OUTPATIENT
Start: 2025-06-21 | End: 2025-06-20

## 2025-06-20 RX ORDER — OXYCODONE HYDROCHLORIDE 5 MG/1
5 TABLET ORAL EVERY 6 HOURS PRN
Status: ON HOLD | COMMUNITY
End: 2025-06-21

## 2025-06-20 RX ORDER — ASPIRIN 325 MG
1 TABLET ORAL DAILY
Status: DISCONTINUED | OUTPATIENT
Start: 2025-06-20 | End: 2025-06-21 | Stop reason: HOSPADM

## 2025-06-20 RX ORDER — ROSUVASTATIN CALCIUM 5 MG/1
5 TABLET, COATED ORAL AT BEDTIME
Status: DISCONTINUED | OUTPATIENT
Start: 2025-06-20 | End: 2025-06-21 | Stop reason: HOSPADM

## 2025-06-20 RX ORDER — ONDANSETRON 4 MG/1
4 TABLET, ORALLY DISINTEGRATING ORAL EVERY 6 HOURS PRN
Status: DISCONTINUED | OUTPATIENT
Start: 2025-06-20 | End: 2025-06-21 | Stop reason: HOSPADM

## 2025-06-20 RX ORDER — PROPOFOL 10 MG/ML
INJECTION, EMULSION INTRAVENOUS PRN
Status: DISCONTINUED | OUTPATIENT
Start: 2025-06-20 | End: 2025-06-20

## 2025-06-20 RX ORDER — SENNA AND DOCUSATE SODIUM 50; 8.6 MG/1; MG/1
1 TABLET, FILM COATED ORAL 2 TIMES DAILY
Status: ON HOLD | COMMUNITY
End: 2025-06-21

## 2025-06-20 RX ORDER — LIDOCAINE 40 MG/G
CREAM TOPICAL
Status: DISCONTINUED | OUTPATIENT
Start: 2025-06-20 | End: 2025-06-21 | Stop reason: HOSPADM

## 2025-06-20 RX ORDER — SODIUM CHLORIDE 9 MG/ML
INJECTION, SOLUTION INTRAVENOUS CONTINUOUS
Status: DISCONTINUED | OUTPATIENT
Start: 2025-06-20 | End: 2025-06-21 | Stop reason: HOSPADM

## 2025-06-20 RX ORDER — ONDANSETRON 4 MG/1
4 TABLET, ORALLY DISINTEGRATING ORAL EVERY 8 HOURS PRN
Status: ON HOLD | COMMUNITY
End: 2025-06-21

## 2025-06-20 RX ORDER — ACETAMINOPHEN 325 MG/1
975 TABLET ORAL EVERY 8 HOURS
Status: DISCONTINUED | OUTPATIENT
Start: 2025-06-20 | End: 2025-06-21 | Stop reason: HOSPADM

## 2025-06-20 RX ORDER — CEFAZOLIN SODIUM 2 G/50ML
2 SOLUTION INTRAVENOUS EVERY 8 HOURS
Status: COMPLETED | OUTPATIENT
Start: 2025-06-20 | End: 2025-06-21

## 2025-06-20 RX ORDER — BISACODYL 10 MG
10 SUPPOSITORY, RECTAL RECTAL DAILY PRN
Status: DISCONTINUED | OUTPATIENT
Start: 2025-06-23 | End: 2025-06-21 | Stop reason: HOSPADM

## 2025-06-20 RX ORDER — METHOCARBAMOL 500 MG/1
500 TABLET, FILM COATED ORAL EVERY 6 HOURS PRN
Status: DISCONTINUED | OUTPATIENT
Start: 2025-06-20 | End: 2025-06-21 | Stop reason: HOSPADM

## 2025-06-20 RX ORDER — FENTANYL CITRATE 50 UG/ML
25 INJECTION, SOLUTION INTRAMUSCULAR; INTRAVENOUS EVERY 5 MIN PRN
Status: DISCONTINUED | OUTPATIENT
Start: 2025-06-20 | End: 2025-06-20 | Stop reason: HOSPADM

## 2025-06-20 RX ORDER — ONDANSETRON 4 MG/1
4 TABLET, ORALLY DISINTEGRATING ORAL EVERY 30 MIN PRN
Status: DISCONTINUED | OUTPATIENT
Start: 2025-06-20 | End: 2025-06-20 | Stop reason: HOSPADM

## 2025-06-20 RX ORDER — FLUOXETINE HYDROCHLORIDE 40 MG/1
40 CAPSULE ORAL AT BEDTIME
Status: DISCONTINUED | OUTPATIENT
Start: 2025-06-20 | End: 2025-06-20

## 2025-06-20 RX ORDER — SODIUM CHLORIDE, SODIUM LACTATE, POTASSIUM CHLORIDE, CALCIUM CHLORIDE 600; 310; 30; 20 MG/100ML; MG/100ML; MG/100ML; MG/100ML
INJECTION, SOLUTION INTRAVENOUS CONTINUOUS
Status: DISCONTINUED | OUTPATIENT
Start: 2025-06-20 | End: 2025-06-20 | Stop reason: HOSPADM

## 2025-06-20 RX ORDER — HYDROMORPHONE HCL IN WATER/PF 6 MG/30 ML
0.4 PATIENT CONTROLLED ANALGESIA SYRINGE INTRAVENOUS EVERY 5 MIN PRN
Status: DISCONTINUED | OUTPATIENT
Start: 2025-06-20 | End: 2025-06-20 | Stop reason: HOSPADM

## 2025-06-20 RX ORDER — BISACODYL 10 MG
10 SUPPOSITORY, RECTAL RECTAL DAILY PRN
Status: DISCONTINUED | OUTPATIENT
Start: 2025-06-23 | End: 2025-06-20

## 2025-06-20 RX ORDER — FENTANYL CITRATE 50 UG/ML
50 INJECTION, SOLUTION INTRAMUSCULAR; INTRAVENOUS EVERY 5 MIN PRN
Status: DISCONTINUED | OUTPATIENT
Start: 2025-06-20 | End: 2025-06-20 | Stop reason: HOSPADM

## 2025-06-20 RX ORDER — NALOXONE HYDROCHLORIDE 0.4 MG/ML
0.4 INJECTION, SOLUTION INTRAMUSCULAR; INTRAVENOUS; SUBCUTANEOUS
Status: DISCONTINUED | OUTPATIENT
Start: 2025-06-20 | End: 2025-06-21 | Stop reason: HOSPADM

## 2025-06-20 RX ORDER — LABETALOL HYDROCHLORIDE 5 MG/ML
10 INJECTION, SOLUTION INTRAVENOUS EVERY 10 MIN PRN
Status: DISCONTINUED | OUTPATIENT
Start: 2025-06-20 | End: 2025-06-21 | Stop reason: HOSPADM

## 2025-06-20 RX ORDER — ONDANSETRON 2 MG/ML
4 INJECTION INTRAMUSCULAR; INTRAVENOUS EVERY 6 HOURS PRN
Status: DISCONTINUED | OUTPATIENT
Start: 2025-06-20 | End: 2025-06-21 | Stop reason: HOSPADM

## 2025-06-20 RX ORDER — NALOXONE HYDROCHLORIDE 0.4 MG/ML
0.1 INJECTION, SOLUTION INTRAMUSCULAR; INTRAVENOUS; SUBCUTANEOUS
Status: DISCONTINUED | OUTPATIENT
Start: 2025-06-20 | End: 2025-06-20 | Stop reason: HOSPADM

## 2025-06-20 RX ORDER — AMOXICILLIN 250 MG
1 CAPSULE ORAL 2 TIMES DAILY
Status: DISCONTINUED | OUTPATIENT
Start: 2025-06-20 | End: 2025-06-21 | Stop reason: HOSPADM

## 2025-06-20 RX ORDER — HYDROMORPHONE HCL IN WATER/PF 6 MG/30 ML
0.2 PATIENT CONTROLLED ANALGESIA SYRINGE INTRAVENOUS EVERY 5 MIN PRN
Status: DISCONTINUED | OUTPATIENT
Start: 2025-06-20 | End: 2025-06-20 | Stop reason: HOSPADM

## 2025-06-20 RX ORDER — OXYCODONE HYDROCHLORIDE 5 MG/1
5 TABLET ORAL EVERY 4 HOURS PRN
Status: DISCONTINUED | OUTPATIENT
Start: 2025-06-20 | End: 2025-06-21 | Stop reason: HOSPADM

## 2025-06-20 RX ORDER — CEFAZOLIN SODIUM/WATER 2 G/20 ML
2 SYRINGE (ML) INTRAVENOUS
Status: COMPLETED | OUTPATIENT
Start: 2025-06-20 | End: 2025-06-20

## 2025-06-20 RX ORDER — FENTANYL CITRATE 50 UG/ML
INJECTION, SOLUTION INTRAMUSCULAR; INTRAVENOUS PRN
Status: DISCONTINUED | OUTPATIENT
Start: 2025-06-20 | End: 2025-06-20

## 2025-06-20 RX ORDER — ONDANSETRON 2 MG/ML
INJECTION INTRAMUSCULAR; INTRAVENOUS PRN
Status: DISCONTINUED | OUTPATIENT
Start: 2025-06-20 | End: 2025-06-20

## 2025-06-20 RX ORDER — OXYCODONE HYDROCHLORIDE 10 MG/1
10 TABLET ORAL EVERY 4 HOURS PRN
Status: DISCONTINUED | OUTPATIENT
Start: 2025-06-20 | End: 2025-06-21 | Stop reason: HOSPADM

## 2025-06-20 RX ORDER — LIDOCAINE 40 MG/G
CREAM TOPICAL
Status: DISCONTINUED | OUTPATIENT
Start: 2025-06-20 | End: 2025-06-20 | Stop reason: HOSPADM

## 2025-06-20 RX ORDER — PROPOFOL 10 MG/ML
INJECTION, EMULSION INTRAVENOUS CONTINUOUS PRN
Status: DISCONTINUED | OUTPATIENT
Start: 2025-06-20 | End: 2025-06-20

## 2025-06-20 RX ORDER — CEFAZOLIN SODIUM/WATER 2 G/20 ML
2 SYRINGE (ML) INTRAVENOUS SEE ADMIN INSTRUCTIONS
Status: DISCONTINUED | OUTPATIENT
Start: 2025-06-20 | End: 2025-06-20 | Stop reason: HOSPADM

## 2025-06-20 RX ORDER — HYDRALAZINE HYDROCHLORIDE 20 MG/ML
10 INJECTION INTRAMUSCULAR; INTRAVENOUS EVERY 30 MIN PRN
Status: DISCONTINUED | OUTPATIENT
Start: 2025-06-20 | End: 2025-06-21 | Stop reason: HOSPADM

## 2025-06-20 RX ORDER — POLYETHYLENE GLYCOL 3350 17 G/17G
17 POWDER, FOR SOLUTION ORAL DAILY
Status: DISCONTINUED | OUTPATIENT
Start: 2025-06-21 | End: 2025-06-21 | Stop reason: HOSPADM

## 2025-06-20 RX ORDER — HYDROMORPHONE HCL IN WATER/PF 6 MG/30 ML
0.2 PATIENT CONTROLLED ANALGESIA SYRINGE INTRAVENOUS
Status: DISCONTINUED | OUTPATIENT
Start: 2025-06-20 | End: 2025-06-21

## 2025-06-20 RX ORDER — METHYLPHENIDATE HYDROCHLORIDE 5 MG/1
15 TABLET ORAL 2 TIMES DAILY
Status: DISCONTINUED | OUTPATIENT
Start: 2025-06-21 | End: 2025-06-21 | Stop reason: HOSPADM

## 2025-06-20 RX ORDER — PROCHLORPERAZINE MALEATE 5 MG/1
5 TABLET ORAL EVERY 6 HOURS PRN
Status: DISCONTINUED | OUTPATIENT
Start: 2025-06-20 | End: 2025-06-21 | Stop reason: HOSPADM

## 2025-06-20 RX ORDER — AMOXICILLIN 250 MG
1 CAPSULE ORAL 2 TIMES DAILY
Status: DISCONTINUED | OUTPATIENT
Start: 2025-06-20 | End: 2025-06-20

## 2025-06-20 RX ORDER — LIDOCAINE HYDROCHLORIDE AND EPINEPHRINE 10; 10 MG/ML; UG/ML
INJECTION, SOLUTION INFILTRATION; PERINEURAL PRN
Status: DISCONTINUED | OUTPATIENT
Start: 2025-06-20 | End: 2025-06-20 | Stop reason: HOSPADM

## 2025-06-20 RX ORDER — DEXAMETHASONE SODIUM PHOSPHATE 4 MG/ML
INJECTION, SOLUTION INTRA-ARTICULAR; INTRALESIONAL; INTRAMUSCULAR; INTRAVENOUS; SOFT TISSUE PRN
Status: DISCONTINUED | OUTPATIENT
Start: 2025-06-20 | End: 2025-06-20

## 2025-06-20 RX ORDER — ONDANSETRON 2 MG/ML
4 INJECTION INTRAMUSCULAR; INTRAVENOUS EVERY 30 MIN PRN
Status: DISCONTINUED | OUTPATIENT
Start: 2025-06-20 | End: 2025-06-20 | Stop reason: HOSPADM

## 2025-06-20 RX ORDER — DEXAMETHASONE SODIUM PHOSPHATE 4 MG/ML
4 INJECTION, SOLUTION INTRA-ARTICULAR; INTRALESIONAL; INTRAMUSCULAR; INTRAVENOUS; SOFT TISSUE
Status: DISCONTINUED | OUTPATIENT
Start: 2025-06-20 | End: 2025-06-20 | Stop reason: HOSPADM

## 2025-06-20 RX ADMIN — PROPOFOL 20 MG: 10 INJECTION, EMULSION INTRAVENOUS at 10:27

## 2025-06-20 RX ADMIN — DEXAMETHASONE SODIUM PHOSPHATE 4 MG: 4 INJECTION, SOLUTION INTRAMUSCULAR; INTRAVENOUS at 10:24

## 2025-06-20 RX ADMIN — PHENYLEPHRINE HYDROCHLORIDE 100 MCG: 10 INJECTION INTRAVENOUS at 09:10

## 2025-06-20 RX ADMIN — DEXMEDETOMIDINE HYDROCHLORIDE 8 MCG: 100 INJECTION, SOLUTION INTRAVENOUS at 09:44

## 2025-06-20 RX ADMIN — DEXMEDETOMIDINE HYDROCHLORIDE 0.5 MCG/KG/HR: 100 INJECTION, SOLUTION INTRAVENOUS at 09:44

## 2025-06-20 RX ADMIN — PROPOFOL 60 MG: 10 INJECTION, EMULSION INTRAVENOUS at 07:41

## 2025-06-20 RX ADMIN — FLUOXETINE 60 MG: 20 CAPSULE ORAL at 19:13

## 2025-06-20 RX ADMIN — PHENYLEPHRINE HYDROCHLORIDE 100 MCG: 10 INJECTION INTRAVENOUS at 10:27

## 2025-06-20 RX ADMIN — FENTANYL CITRATE 25 MCG: 50 INJECTION, SOLUTION INTRAMUSCULAR; INTRAVENOUS at 14:16

## 2025-06-20 RX ADMIN — PHENYLEPHRINE HYDROCHLORIDE 100 MCG: 10 INJECTION INTRAVENOUS at 09:23

## 2025-06-20 RX ADMIN — PHENYLEPHRINE HYDROCHLORIDE 100 MCG: 10 INJECTION INTRAVENOUS at 08:22

## 2025-06-20 RX ADMIN — Medication 2 G: at 09:26

## 2025-06-20 RX ADMIN — SODIUM CHLORIDE: 0.9 INJECTION, SOLUTION INTRAVENOUS at 15:31

## 2025-06-20 RX ADMIN — FENTANYL CITRATE 25 MCG: 50 INJECTION, SOLUTION INTRAMUSCULAR; INTRAVENOUS at 15:10

## 2025-06-20 RX ADMIN — PHENYLEPHRINE HYDROCHLORIDE 200 MCG: 10 INJECTION INTRAVENOUS at 07:53

## 2025-06-20 RX ADMIN — DEXMEDETOMIDINE HYDROCHLORIDE 8 MCG: 100 INJECTION, SOLUTION INTRAVENOUS at 09:48

## 2025-06-20 RX ADMIN — PHENYLEPHRINE HYDROCHLORIDE 200 MCG: 10 INJECTION INTRAVENOUS at 08:51

## 2025-06-20 RX ADMIN — PHENYLEPHRINE HYDROCHLORIDE 100 MCG: 10 INJECTION INTRAVENOUS at 09:17

## 2025-06-20 RX ADMIN — SODIUM CHLORIDE, SODIUM LACTATE, POTASSIUM CHLORIDE, AND CALCIUM CHLORIDE: .6; .31; .03; .02 INJECTION, SOLUTION INTRAVENOUS at 07:32

## 2025-06-20 RX ADMIN — FENTANYL CITRATE 50 MCG: 50 INJECTION INTRAMUSCULAR; INTRAVENOUS at 07:41

## 2025-06-20 RX ADMIN — ONDANSETRON 4 MG: 2 INJECTION INTRAMUSCULAR; INTRAVENOUS at 10:24

## 2025-06-20 RX ADMIN — PROPOFOL 20 MG: 10 INJECTION, EMULSION INTRAVENOUS at 13:51

## 2025-06-20 RX ADMIN — CEFAZOLIN SODIUM 2 G: 2 SOLUTION INTRAVENOUS at 17:08

## 2025-06-20 RX ADMIN — PHENYLEPHRINE HYDROCHLORIDE 100 MCG: 10 INJECTION INTRAVENOUS at 09:56

## 2025-06-20 RX ADMIN — METHOCARBAMOL 500 MG: 500 TABLET ORAL at 19:13

## 2025-06-20 RX ADMIN — PHENYLEPHRINE HYDROCHLORIDE 0.2 MCG/KG/MIN: 10 INJECTION INTRAVENOUS at 09:29

## 2025-06-20 RX ADMIN — ROSUVASTATIN CALCIUM 5 MG: 5 TABLET, FILM COATED ORAL at 19:14

## 2025-06-20 RX ADMIN — LIDOCAINE HYDROCHLORIDE 20 MG: 20 INJECTION, SOLUTION INFILTRATION; PERINEURAL at 07:41

## 2025-06-20 RX ADMIN — MIDAZOLAM 1 MG: 1 INJECTION INTRAMUSCULAR; INTRAVENOUS at 07:27

## 2025-06-20 RX ADMIN — Medication 1 CHEW TAB: at 19:13

## 2025-06-20 RX ADMIN — DEXMEDETOMIDINE HYDROCHLORIDE 8 MCG: 100 INJECTION, SOLUTION INTRAVENOUS at 08:37

## 2025-06-20 RX ADMIN — PHENYLEPHRINE HYDROCHLORIDE 100 MCG: 10 INJECTION INTRAVENOUS at 08:58

## 2025-06-20 RX ADMIN — DEXMEDETOMIDINE HYDROCHLORIDE 8 MCG: 100 INJECTION, SOLUTION INTRAVENOUS at 12:42

## 2025-06-20 RX ADMIN — ACETAMINOPHEN 975 MG: 325 TABLET ORAL at 17:08

## 2025-06-20 RX ADMIN — PROPOFOL 75 MCG/KG/MIN: 10 INJECTION, EMULSION INTRAVENOUS at 07:41

## 2025-06-20 RX ADMIN — FENTANYL CITRATE 50 MCG: 50 INJECTION INTRAMUSCULAR; INTRAVENOUS at 09:57

## 2025-06-20 RX ADMIN — DEXMEDETOMIDINE HYDROCHLORIDE 12 MCG: 100 INJECTION, SOLUTION INTRAVENOUS at 13:51

## 2025-06-20 ASSESSMENT — ACTIVITIES OF DAILY LIVING (ADL)
ADLS_ACUITY_SCORE: 39

## 2025-06-20 NOTE — PLAN OF CARE
Goal Outcome Evaluation:    Arrived from: PACU  Belongings/meds: Shunt , clothing, cell phone, glasses.  2 RN Skin Assessment Completed by: Ahmet  Skin Findings: Head incision SOPHIE, pin sites with Primapore x2  Non-intact findings documented (yes/no/NA): yes    Status: S/p L side DBS placement for essential tremor 6/20.  Vitals: VSS RA ex soft Bps   Neuros: A/Ox4, 5/5 strengths, PERRLA. Slightly Ramona bilaterally.  IV: PIV SL  Labs/Electrolytes: WNL  Resp: WNL RA  Diet: Regular, tolerating well.  GI: LBM PTA  : Voiding spontaneously to the bathroom  Skin: L scalp incision, SOPHIE. Old pin sites with Primapore x2.  Pain: HA, managed with Tylenol/Robaxin.  Activity: Up ad rosa   Social: Daughter Monisha at bedside, helpful and supportive with cares.  Plan: Continue with POC.

## 2025-06-20 NOTE — OR NURSING
Neuro surgery team came to see pt at bedside at verify no new injury from the xray board. All clear, as well at xray and CT results look good. She is cleared to go upstairs to her inpatient room.

## 2025-06-20 NOTE — PHARMACY-ADMISSION MEDICATION HISTORY
Pharmacy Intern Admission Medication History    Admission medication history is complete. The information provided in this note is only as accurate as the sources available at the time of the update.    Information Source(s): Patient and CareEverywhere/SureScripts via in-person    Pertinent Information:   Patient confirmed all her home medications and also shared with me that she still has ondansetron, oxycodone, and senna at home, and they are taken as needed.     Changes made to PTA medication list:  Added:   Ondansetron 4 mg ODT tablet  Oxycodone 5 mg tablet  Senna 8.6-50 MG tablet  Deleted: None  Changed: None    Allergies reviewed with patient and updates made in EHR: yes    Medication History Completed By: Radha Piedra 6/20/2025 6:14 PM    PTA Med List   Medication Sig Note Last Dose/Taking    FLUoxetine (PROZAC) 20 MG capsule Take 20 mg by mouth at bedtime.  6/18/2025 Bedtime    FLUoxetine (PROZAC) 40 MG capsule Take 40 mg by mouth at bedtime.  6/18/2025 Bedtime    methylphenidate (RITALIN) 10 MG tablet Take 15 mg by mouth 2 times daily. Takes at 0700 and 1200 6/18/2025: Hold AM DOS 6/19/2025 Noon    Multiple Vitamins-Minerals (EQ MULTIVITAMINS ADULT GUMMY PO) Take 2 chew tab by mouth daily. Pt rotates between gummies and tablets 6/18/2025: Hold AM DOS 6/16/2025    ondansetron (ZOFRAN ODT) 4 MG ODT tab Take 4 mg by mouth every 8 hours as needed for nausea.  Taking As Needed    oxyCODONE (ROXICODONE) 5 MG tablet Take 5 mg by mouth every 6 hours as needed for moderate pain.  Taking As Needed    rosuvastatin (CRESTOR) 5 MG tablet Take 1 tablet by mouth at bedtime  6/18/2025 Bedtime    SENNA-docusate sodium (SENNA S) 8.6-50 MG tablet Take 1 tablet by mouth 2 times daily. For 10 Days (Patient taking differently: Take 1 tablet by mouth 2 times daily. PRN)  Taking Differently

## 2025-06-20 NOTE — ANESTHESIA CARE TRANSFER NOTE
Patient: Aida DAY Trevin    Procedure: Procedure(s):  stealth assisted Left side deep brain stimulator placement, phase I & II combined, placement of left side deep brain stimulator electrode, target left ventral intermediate nucleus of the thalamus, with microelectrode recording and connection to existing generator/battery       Diagnosis: Essential tremor [G25.0]  Diagnosis Additional Information: No value filed.    Anesthesia Type:   MAC     Note:    Oropharynx: oropharynx clear of all foreign objects and spontaneously breathing  Level of Consciousness: drowsy  Oxygen Supplementation: nasal cannula  Level of Supplemental Oxygen (L/min / FiO2): 2  Independent Airway: airway patency satisfactory and stable  Dentition: dentition unchanged  Vital Signs Stable: post-procedure vital signs reviewed and stable  Report to RN Given: handoff report given  Patient transferred to: PACU    Handoff Report: Identifed the Patient, Identified the Reponsible Provider, Reviewed the pertinent medical history, Discussed the surgical course, Reviewed Intra-OP anesthesia mangement and issues during anesthesia, Set expectations for post-procedure period and Allowed opportunity for questions and acknowledgement of understanding      Vitals:  Vitals Value Taken Time   /49 06/20/25 14:10   Temp     Pulse 93 06/20/25 14:12   Resp 14 06/20/25 14:12   SpO2 96 % 06/20/25 14:12   Vitals shown include unfiled device data.    Electronically Signed By: Valentina Quiroga DO  June 20, 2025  2:13 PM

## 2025-06-20 NOTE — OR NURSING
During portable xray, the xray board fell and landed on the left side of pt's head, very close to her  new incision site. Pt tearful and complaining of 9/10 pain. 25mcg of fentanyl given for this pain. See MAR.

## 2025-06-20 NOTE — BRIEF OP NOTE
[facility]    Brief Operative Note    Pre-operative diagnosis: Essential tremor [G25.0]  Post-operative diagnosis Same as pre-operative diagnosis    Procedure: stealth assisted Left side deep brain stimulator placement, phase I & II combined, placement of left side deep brain stimulator electrode, target left ventral intermediate nucleus of the thalamus, with microelectrode recording and connection to existing generator/battery, Left - Head    Surgeon: Surgeons and Role:     * Hermann Schumacher MD - Primary     * Iveth Reyes MD - Resident - Assisting  Anesthesia: MAC with Local   Estimated Blood Loss: 10cc    Drains: None  Specimens: * No specimens in log *  Findings:   Electrode placed targeting left ventral intermediate nucleus of the thalamus. Connected to extension wire and existing battery, turned on at end of case. Final electrode placement 1mm above target in medial track of Jose G Gun. Impedances normal..  Complications: None.  Implants:   Implant Name Type Inv. Item Serial No.  Lot No. LRB No. Used Action   SenSight Tom Hole Device Kit Other   [employer] [id number] Left 1 Implanted   SenSight  Directional Lead KIt   [id number]   Left 1 Implanted       Iveth Reyes MD  Neurosurgery PGY-1  Please page #[phone] on-call neurosurgery resident with questions

## 2025-06-20 NOTE — OP NOTE
Date: 06/20/25    DBS Intra-operative Note Neurology- Dr. Ospina    Diagnosis: ET  Brain Side: L  Brain Target(s): VIM    Surgeon: Dr Winsome MCGHEE, Neurology, Macrostimulation, Threshold Check, Neurological Examination: Dr Ospina / Reese    The indication for surgery is: Medication refractory essential tremor, implantation of contralateral lead  Complications encountered: None    OR procedure and clinical course of the patient:    The patient has been thoroughly evaluated by an interdisciplinary process and deemed to be a DBS candidate.  Typically we have neurology, neurosurgery, neuropsychology, psychiatry and sometimes rehabilitation services see the patient pre-operatively.  This evaluation is followed by an interdisciplinary team meeting where we discuss risks and benefits, brain targets, unilateral versus bilateral approaches, realistic benefits and outcome measures.  The patient relevant patient-oriented material on DBS risks and benefits and increased risks are relayed to the patient and family before the operation.    The risks and benefits of a DBS operation have been explained to the patient and also realistic benefits have been discussed in person and in the interdisciplinary team.    The following DBS candidate information has been thoroughly reviewed prior to the operation in the clinic setting-    This patient is at the present time considered  a potential candidate for deep brain stimulation (DBS).   We can perform DBS only following an appropriate patient screening, which is performed by an experienced interdisciplinary DBS team.  The discussion includes 1- the patients reasons for having DBS (symptom specific), 2- whether the risks are worth the benefits for that specific patient- given their pre-operative expectations, 3- unilateral versus bilateral DBS, removal of old DBS/batteries, or extra  rescue leads  in patients with existing DBS leads,  4- staged unilateral DBS versus bilateral DBS weighing  safety and need for two leads as the primary determinants of the decision, 5- DBS versus lesion therapy, and 5- type of DBS battery.  Following the interdisciplinary team meeting, potential candidates are usually contacted by phone to apprise them of risks, benefits, and whether they are ultimately  a candidate.   Prior to the interdisciplinary team meeting, the patient must have appointments with neurology, neurosurgery, neuropsychology, and psychiatry and additionally, if they have a diagnosis of Parkinson s disease they must have an on-off levodopa challenge test (performed using the UPDRS Parkinson s disease rating scale).  Select patients may need to meet with a physical therapist, occupational therapist, speech therapist, , and/or financial counselor.  Some patients may be deemed an immediate DBS candidate, some a future DBS candidate (e.g. may not be severe enough, or they may need follow-up testing to check disease progression in an area such as cognition), and some may be deemed not an appropriate candidate.  There may be other therapies or clinical trials that patients who are not candidates may potentially receive.    The Following Risks and Benefits of DBS have been throughly reviewed prior to the operation in the clinic setting-    The risk-benefit ratio was discussed with the patient and any family members present.  We discussed the importance of identifying the primary reasons/symptoms that are driving their decision to have DBS.  Each of the individual reasons was then matched with an expectation(s) for success.  Success may vary from diagnosis to diagnosis, and from symptom to symptom.  Parkinson s disease patient usually only improve in symptoms that are levodopa responsive, or alternatively they may improve in on-off fluctuations, tremor,  on  time, and in amount of dyskinesia.  They will not improve in levodopa unresponsive symptoms, and DBS will not stop disease progression.   Improvement may have a wide range in individual patients, and outcome is usually not perfect.  There may be decrements in specific areas and most particularly when we see decrements they are usually in in walking, talking and speaking.  These decrements may occur with a single lead and may be more pronounced with two leads.  These general rules hold for most DBS indications (with the exception of the levodopa responsiveness rule in Parkinson s which may not apply to other disorders).  Some symptoms and diagnoses such as secondary dystonia, Tourette syndrome, epilepsy, cluster headache, and OCD may have a wide response range and generalizations are hard to make in these conditions.  DBS cases can have associated risks and these risks are higher in older age (particularly over 70), with brain atrophy, and in patients on blood thinners or in patients who have dementia and/or uncontrolled psychiatric disorders.  We have explained the risks and benefits to the patients and to any family members present, and they have had time to ask any questions or to ask for any clarifications.   We have reviewed the risks of lead insertion, and microelectrode recording insertion as well as the risks of infection just from these procedures.  Other overall risks include hemorrhage, infection, stroke, seizure, hydrocephalus, weakness, numbness, changes in vision, changes in speech (softening, problems getting words out of the mouth, slurring), worsened general cognition/mood, suicide, worsening co-morbidities (e.g. cardiac, lung, sleep, fatigue), anxiety, depression, fatigue, and death.  The devices can break, they can move, and they can require movement if suboptimally placed--or alternatively if they migrate.  Each DBS lead inserted increases the overall risk especially in emergences of walking, talking and thinking problems (higher risk for example with two leads compared to one).  We have explained that expectations for walking,  talking, balance, handwriting, and perfect tremor control are usually not obtained by the use of DBS therapy, and that DBS is not a cure.  Additionally we have explained that DBS may have both stimulation induced effects (could be reversible effects by simply changing settings), and also lesion effects (may not be reversible, and may be related to inserting the DBS lead and the recording lead(s)).  The patient understood the risks and benefits and wanted to proceed.    Following the candidate discussion and the realistic discussion of risks and benefits as well as expectations-- the patient was prepared for a DBS surgery.    A stereotactic CT scan was fused to a MRI.  Direct and indirect targeting were performed.    Side 1:  The functional target was:  Lateral (X): -12.0  AP (Y) : -6.512  Axial (Z): -0.01  AP angle: 59.5  MSP Angle: 17.0    Microelectrode Recording Summary:    Number of recording passes: 3 (channels used on the Jose G Gun for simultaneous passes)    Posterior: TAZ detected a robust thalamic signals throughout the entire recording, with evoked potentials corresponding to passive movement around 8 to 7-1/2 mm to target, with somatotopy corresponding to fingers and wrist all the way to soft at the elbow.  That was followed by deep pressure around the thumb area at 5 mm to target.  VIM VC border was projected to be around 3.4 mm to target, based on identification of light-touch evoked potentials around the tip of the fingers from the third to the fifth fingers similar phenomenon was identified 2 mm above target.  Important to note there were no light-touch evoked potentials anywhere else including face, mouth, or tongue.     Center: TAZ detected a robust thalamic potentials, in a similar fashion to the posterior track.  We did check for light touch and the pressure in a few different areas similarly to posterior, however there were no corresponding change in the signal. Macrostim through the sheath at the  projected depth corresponding to the 2 middle contacts on the electrode lead showed thresholds of 1-1.5 mA for a feeling of muscle contraction around the thumb, and when we increase the stimulation amplitude to 2.0 mA there was a clear, visible, reproducible facial contractions.    Medial: The medial breast was performed after sheath stimulation on the center pass in order to try to estimate the distance to the lateral border of the target.  No TAZ was performed the cannula was dropped at the same depth where the other 2 electrodes were located. Macrostim through the sheath at the projected depth corresponding to the 2 middle contacts on the electrode lead showed thresholds of 2 mA for a sensation of wrist contraction, which was reproducible at 2.5 mA, where the examiner could feel a transient muscle contraction as well.  At 3 mA that sensation is spread to fingers and face, and then at 3.5 there was clear, reproducible contractions in her tongue and face.    SEP's checked (Y/N): Yes    Final depth DBS Lead: 1 mm above target  Final lead placement: Medial track of the Jose G Gun    Side1:  [employer] system (0.5mm SenSight lead)  Once the recording was performed then the lead contacts were checked for thresholds for benefit and side effect:    Thresholds checked in monopolar configuration (monopolar/bipolar) at PW of 90 and Frequency of 130:  Contact 0: 3.5 v (3.3 mA) - patient experienced sensation of blurry vision associated with some twitching of the lower eyelid. Benefit tested at 2.5 v -patient had good lesional benefit with good amount of tremor suppression, however we could appreciate stimulation induced ataxia at that particular level and amplitude, mostly evidenced by finger julius task as well as a dot approximation task to the clipboard.   Contact 1: 4.0 v (3.7 mA)- patient experienced similar visual phenomena with similar appreciation of the contraction. Benefit tested at 3.0 v -patient maintained good amount  of tremor suppression, ataxia resolved, with good approach to the clipboard and improvement on the finger julius task.  Contact 2: 4.0 v (4.0 mA)- patient experienced similar pattern of visual complaints and tightening of the lower eyelid. Benefit tested at 3.0 v -crispy approach to the clipboard, she maintained no oscillations on spiral drawing, as well as no oscillation on hands outstretched or wing beating.  Contact 3: 4.0 v (4.0 mA)- patient experienced similar eye phenomenon however with associated dysarthria. Benefit tested at 3.0 v -similar level of benefit to contact #2    Expect best contact to be contact number 2  This was true intraoperative programming and the benefits and side effects will be used in the outpatient to further guide the programming in the clinic.      We checked the lead placement by intraoperative fluoroscopy.    For immediate postoperative DBS programming only:  New lead reconnected to previously implanted pulse generator (Yes/No/NA) - Yes    Immediate post-operative programming (Yes/No/NA) - NA   Total programming time: na    A member of the team contacted family to update them regarding the case.  There is a 1-3 to 4 chance of post-operative confusion and if confused or any change in neurological status they may require a CT and a delayed discharge.  There will be scheduled programming visits in our clinic-- after the battery is placed which is typically done within a few weeks after lead placement.  We look forward to seeing the patient in follow-up.  We encourage patients to reach our team in the interim if there are any questions, or alternatively go to the ER for any urgent issues that demand immediate medical assistance, in addition to contacting us.  They understand there could be post-op pain, swelling, infection and other acute management issues.  There may also be DBS unrelated issues that can be life-threatening like PE, MI and other emerging issues and they are encouraged  after discharge to go to the ER immediately for any suspicion of an urgent issue.    Any Optional Comments:  This patient has been screened for depression and has no active uncontrolled depression as confirmed prior to OR by our interdisciplinary team, and in the OR prior to this procedure following standard of care.    INTRAOPERATIVE NEUROPHYSIOLOGIC MAPPING & ANALYSIS    (UNITS) - CPT - Description      (1) - 44504-96 - Functional mapping on brain surface, 1st hour    (2) - 41396-92 - Each additional hour of physician attendance    (-) - 81239 - Electronic analysis of implanted neurostimulator pulse generator system, First 15 minutes    (-) - 38014 - Electronic analysis of implanted neurostimulator pulse generator system, Each additional 15 minutes    (1) - 05222-33 - SSEP s    (-) - 33593-13 - VEP s    Physician attendance start time:9:45  ICD-10 diagnosis Code: G25.1     Physician attendance stop time:12:30  Modifier: 48679-28    ________________________________________________________________  G20     Parkinson s Disease      G21.11  Parkinsonism, secondary     G90.3    Parkinsonism with hypotension    G24.1  Dystonia       G27.8  Dystonia, symptomatic torsion  G25.1  Tremor, Benign Essential  G25.3  Myoclonus  G25.69 Tics of Organic Origin  F95.2    Tourette s Syndrome  F95.9  Tic, not classified  F42  Obsessive Compulsive Disorder (OCD)         Include Tumor Staging In Mohs Note?: Please Select the Appropriate Response

## 2025-06-20 NOTE — PROGRESS NOTES
Brief Note    Paged to bedside by RN. Patient reported to RN that x-ray tech dropped heavy board on her head on the left surgical site. Tech did not inform RN or any other staff before departing. RN informed me as soon as she was made aware of incident. I came to bedside to assess. Patient's surgical sites intact, no evidence of hematoma present. CT scan completed after incident, and without evidence of trauma. Will continue with plan of care, continue to monitor.    Iveth Reyes MD  Neurosurgery PGY-1  Please page #[phone] (urgent)/VOCERA (non-urgent) the on-call neurosurgery resident per [facility] with questions

## 2025-06-20 NOTE — PROGRESS NOTES
"Aida Zhong is a 65 year old female patient.  No diagnosis found.  Past Medical History:   Diagnosis Date    Depression     Essential tremor     HLD (hyperlipidemia)     PONV (postoperative nausea and vomiting)      No current outpatient medications on file.     Allergies   Allergen Reactions    Penicillins Rash     Active Problems:    S/P deep brain stimulator placement    Blood pressure 105/52, pulse 94, temperature 97.9  F (36.6  C), temperature source Oral, resp. rate 11, height 1.727 m (5' 8\"), weight 69 kg (152 lb 1.9 oz), SpO2 94%, not currently breastfeeding.    Subjective  Objective  Assessment & Plan    Sosa Armenta RN  6/20/2025      \"Pt: Aida Zhong PACU. Pt. imaging complete. Patient also stated that when x-ray was done, the heavy board fell on her head. Would you be able to review imaging and assess patient? Thank you. [phone]\" Sent to inpatient neurosurgeon first call.     Spoke with neurosurgeon via phone. Explained what patient reported.Patient to be held in PACU. Until neurosurgery clears patient.   "

## 2025-06-20 NOTE — ANESTHESIA POSTPROCEDURE EVALUATION
Patient: Aida Zhong    Procedure: Procedure(s):  stealth assisted Left side deep brain stimulator placement, phase I & II combined, placement of left side deep brain stimulator electrode, target left ventral intermediate nucleus of the thalamus, with microelectrode recording and connection to existing generator/battery       Anesthesia Type:  MAC    Note:  Disposition: Admission   Postop Pain Control: Uneventful            Sign Out: Well controlled pain   PONV: Yes            Sign Out: PONV/POV resolved with treatment   Neuro/Psych: Uneventful            Sign Out: Acceptable/Baseline neuro status   Airway/Respiratory: Uneventful            Sign Out: Acceptable/Baseline resp. status   CV/Hemodynamics: Uneventful            Sign Out: Acceptable CV status; No obvious hypovolemia; No obvious fluid overload   Other NRE: NONE   DID A NON-ROUTINE EVENT OCCUR? No           Last vitals:  Vitals Value Taken Time   /52 06/20/25 15:45   Temp 36.6  C (97.9  F) 06/20/25 14:10   Pulse 90 06/20/25 15:59   Resp 12 06/20/25 15:59   SpO2 91 % 06/20/25 15:59   Vitals shown include unfiled device data.    Electronically Signed By: Lluvia Serrano MD  June 20, 2025  3:59 PM

## 2025-06-20 NOTE — DISCHARGE SUMMARY
[facility] Discharge Summary and Instructions    Aida Zhong MRN# [id number]   Age: 65 year old YOB: 1960     Date of Admission:  6/20/2025  Date of Discharge::  6/21/2025  Admitting Physician:  Hermann Schumacher MD  Discharge Physician:  Hermann Schumacher MD          Admission Diagnoses:   Essential tremor [G25.0]  S/P deep brain stimulator placement [Z96.89]          Discharge Diagnosis:     Essential tremor [G25.0]  S/P deep brain stimulator placement [Z96.89]     Clinically Significant Risk Factors Present on Admission                                   # Financial/Environmental Concerns:               Procedures:   Stealth assisted Left side deep brain stimulator placement, phase I & II combined, placement of left side deep brain stimulator electrode, target left ventral intermediate nucleus of the thalamus, with microelectrode recording and connection to existing generator/battery            Brief History of Illness:   Ms. Aida Zhong is a 65 year old left handed female with a diagnosis of essential tremor. Patient states that her tremor began when she was 13 years old. She was at [employer] and people noted her tremor when she was lighting a candle. 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 She was ultimately diagnosed with essential tremor in 2014. Her tremor has progressed and it is now interfering with drinking and eating. She has seen Dr. Moon who is recommending DBS workup. She is s/p right side deep brain stimulator placement, phase I, placement of right side deep brain stimulator electrode, target right ventral intermediate nucleus of the thalamus with microelectrode recording on 5/13/2025 and s/p deep brain stimulator placement, phase II, placement of deep brain stimulator generator/battery over the right chest wall on 5/22/2025.  Patient tolerated both of the procedures well and there were no complications.  After her phase I  surgery, patient's postoperative CT head was without any concerning findings.  She was discharged to home on POD#1.  She did well with her phase II surgery. Patient has elected to undergo above-mentioned procedure.           Hospital Course:   Patient underwent above-mentioned procedure on 6/20/2025.  Following the procedure the patient was transferred to the floor.  Patient underwent post operative CT head which showed expected post operative changes, and XR skull which showed appropriate positioning of the electrode.  Patient completed x3 doses of ancef.  On post operative day 1, she was ambulating, voiding without a martinez, eating a regular diet, pain was well controlled, had no evidence of post-operative complications and therefore she was discharged.          Discharge Medications:     Current Discharge Medication List        CONTINUE these medications which have CHANGED    Details   ondansetron (ZOFRAN ODT) 4 MG ODT tab Take 1 tablet (4 mg) by mouth every 6 hours as needed for nausea.  Qty: 12 tablet, Refills: 1    Associated Diagnoses: S/P deep brain stimulator placement; Postoperative state      oxyCODONE (ROXICODONE) 5 MG tablet Take 1 tablet (5 mg) by mouth every 6 hours as needed for moderate to severe pain.  Qty: 12 tablet, Refills: 0    Associated Diagnoses: S/P deep brain stimulator placement; Postoperative state      senna-docusate (SENOKOT-S/PERICOLACE) 8.6-50 MG tablet Take 1 tablet by mouth 2 times daily for 7 days.  Qty: 14 tablet, Refills: 0    Associated Diagnoses: S/P deep brain stimulator placement; Postoperative state           CONTINUE these medications which have NOT CHANGED    Details   !! FLUoxetine (PROZAC) 20 MG capsule Take 20 mg by mouth at bedtime.      !! FLUoxetine (PROZAC) 40 MG capsule Take 40 mg by mouth at bedtime.      methylphenidate (RITALIN) 10 MG tablet Take 15 mg by mouth 2 times daily. Takes at 0700 and 1200      Multiple Vitamins-Minerals (EQ MULTIVITAMINS ADULT GUMMY  "PO) Take 2 chew tab by mouth daily. Pt rotates between gummies and tablets      rosuvastatin (CRESTOR) 5 MG tablet Take 1 tablet by mouth at bedtime       !! - Potential duplicate medications found. Please discuss with provider.          Exam:   Physical Exam  BP 91/80 (BP Location: Left arm)   Pulse 84   Temp 98.5  F (36.9  C) (Oral)   Resp 18   Ht 1.727 m (5' 8\")   Wt 69 kg (152 lb 1.9 oz)   SpO2 99%   BMI 23.13 kg/m    General: Appears comfortable, NAD  Wound: Incisions clean, dry, intact   Neurologic Exam:  - AOx3.  - Follows commands.  - Speech fluent, spontaneous.  - No gaze preference. No apparent hemineglect.  - PERRL, EOMI.  - Face symmetric at rest and on activation  - Trapezii and sternocleidomastoid muscles 5/5 bilaterally.  - No pronator drift.  - Full strength in BUE, BLE           Discharge Instructions and Follow-Up:     Discharge diet: Regular   Discharge activity: You may advance activity as tolerated. No strenuous exercise or heay lifting greater than 10 lbs for 4 weeks or until seen and cleared in clinic.     Discharge follow-up:  7/7/2025 8:45 AM (30 min)  [place]   Arrive by:  8:30 AM   POST-OP NEUROSURG   [facility] [place] [facility]   Hermann Schumacher MD          Wound care: Ok to shower, however no scrubbing of the wound and no soaking of the wound, meaning no bathtubs or swimming pools. Pat dry only. Leave wound open to air.  Patient to have wound checked 2 weeks following surgery.    Wound location: left scalp, posterior scalp  Closure technique: monocryl with dermabond  Dressing needs: keep clean and dry  Post-op care at follow-up: Keep dry and clean       Please call if you have:  1. increased pain, redness, drainage, swelling at your incision  2. fevers > 101.5 F degrees  3. with any questions or concerns.  You may reach the Neurosurgery clinic at [phone] during regular work hours. ER at [phone].    and ask for the Neurosurgery Resident on call at [phone], during off " hours or weekends.         Discharge Disposition:     Discharged to home        Iveth Reyes MD  Neurosurgery PGY-1  Pager #[phone]    Marylin Ramirez MD, PhD  PGY-3 Neurosurgery    Please contact neurosurgery resident on call with questions.    Dial * * [phone], enter [phone] when prompted.

## 2025-06-21 ENCOUNTER — APPOINTMENT (OUTPATIENT)
Dept: CT IMAGING | Facility: CLINIC | Age: 65
DRG: 027 | End: 2025-06-21
Payer: COMMERCIAL

## 2025-06-21 VITALS
WEIGHT: 152.12 LBS | HEART RATE: 84 BPM | RESPIRATION RATE: 18 BRPM | TEMPERATURE: 98.5 F | OXYGEN SATURATION: 99 % | BODY MASS INDEX: 23.05 KG/M2 | SYSTOLIC BLOOD PRESSURE: 91 MMHG | HEIGHT: 68 IN | DIASTOLIC BLOOD PRESSURE: 80 MMHG

## 2025-06-21 LAB
ANION GAP SERPL CALCULATED.3IONS-SCNC: 11 MMOL/L (ref 7–15)
BUN SERPL-MCNC: 22.9 MG/DL (ref 8–23)
CALCIUM SERPL-MCNC: 9.3 MG/DL (ref 8.8–10.4)
CHLORIDE SERPL-SCNC: 105 MMOL/L (ref 98–107)
CREAT SERPL-MCNC: 0.76 MG/DL (ref 0.51–0.95)
EGFRCR SERPLBLD CKD-EPI 2021: 86 ML/MIN/1.73M2
ERYTHROCYTE [DISTWIDTH] IN BLOOD BY AUTOMATED COUNT: 12.2 % (ref 10–15)
GLUCOSE BLDC GLUCOMTR-MCNC: 133 MG/DL (ref 70–99)
GLUCOSE SERPL-MCNC: 124 MG/DL (ref 70–99)
HCO3 SERPL-SCNC: 21 MMOL/L (ref 22–29)
HCT VFR BLD AUTO: 39.5 % (ref 35–47)
HGB BLD-MCNC: 12.7 G/DL (ref 11.7–15.7)
MAGNESIUM SERPL-MCNC: 2 MG/DL (ref 1.7–2.3)
MCH RBC QN AUTO: 29.3 PG (ref 26.5–33)
MCHC RBC AUTO-ENTMCNC: 32.2 G/DL (ref 31.5–36.5)
MCV RBC AUTO: 91 FL (ref 78–100)
PHOSPHATE SERPL-MCNC: 3.9 MG/DL (ref 2.5–4.5)
PLATELET # BLD AUTO: 318 10E3/UL (ref 150–450)
POTASSIUM SERPL-SCNC: 4 MMOL/L (ref 3.4–5.3)
RBC # BLD AUTO: 4.34 10E6/UL (ref 3.8–5.2)
SODIUM SERPL-SCNC: 137 MMOL/L (ref 135–145)
WBC # BLD AUTO: 11.2 10E3/UL (ref 4–11)

## 2025-06-21 PROCEDURE — 70450 CT HEAD/BRAIN W/O DYE: CPT | Mod: 26 | Performed by: RADIOLOGY

## 2025-06-21 PROCEDURE — 70450 CT HEAD/BRAIN W/O DYE: CPT

## 2025-06-21 PROCEDURE — 250N000013 HC RX MED GY IP 250 OP 250 PS 637

## 2025-06-21 PROCEDURE — 83735 ASSAY OF MAGNESIUM: CPT | Performed by: NEUROLOGICAL SURGERY

## 2025-06-21 PROCEDURE — 80048 BASIC METABOLIC PNL TOTAL CA: CPT

## 2025-06-21 PROCEDURE — 250N000011 HC RX IP 250 OP 636

## 2025-06-21 PROCEDURE — 84100 ASSAY OF PHOSPHORUS: CPT | Performed by: NEUROLOGICAL SURGERY

## 2025-06-21 PROCEDURE — 36415 COLL VENOUS BLD VENIPUNCTURE: CPT

## 2025-06-21 PROCEDURE — 85018 HEMOGLOBIN: CPT

## 2025-06-21 RX ORDER — ONDANSETRON 4 MG/1
4 TABLET, ORALLY DISINTEGRATING ORAL EVERY 6 HOURS PRN
Qty: 12 TABLET | Refills: 1 | Status: SHIPPED | OUTPATIENT
Start: 2025-06-21 | End: 2025-07-07

## 2025-06-21 RX ORDER — AMOXICILLIN 250 MG
1 CAPSULE ORAL 2 TIMES DAILY
Qty: 14 TABLET | Refills: 0 | Status: SHIPPED | OUTPATIENT
Start: 2025-06-21 | End: 2025-06-28

## 2025-06-21 RX ORDER — OXYCODONE HYDROCHLORIDE 5 MG/1
5 TABLET ORAL EVERY 6 HOURS PRN
Qty: 12 TABLET | Refills: 0 | Status: SHIPPED | OUTPATIENT
Start: 2025-06-21 | End: 2025-07-07

## 2025-06-21 RX ADMIN — METHYLPHENIDATE HYDROCHLORIDE 15 MG: 5 TABLET ORAL at 09:08

## 2025-06-21 RX ADMIN — CEFAZOLIN SODIUM 2 G: 2 SOLUTION INTRAVENOUS at 01:08

## 2025-06-21 RX ADMIN — METHOCARBAMOL 500 MG: 500 TABLET ORAL at 09:27

## 2025-06-21 RX ADMIN — METHOCARBAMOL 500 MG: 500 TABLET ORAL at 01:05

## 2025-06-21 RX ADMIN — METHYLPHENIDATE HYDROCHLORIDE 15 MG: 5 TABLET ORAL at 13:22

## 2025-06-21 RX ADMIN — Medication 1 CHEW TAB: at 09:07

## 2025-06-21 RX ADMIN — ACETAMINOPHEN 975 MG: 325 TABLET ORAL at 01:05

## 2025-06-21 RX ADMIN — CEFAZOLIN SODIUM 2 G: 2 SOLUTION INTRAVENOUS at 09:19

## 2025-06-21 RX ADMIN — OXYCODONE HYDROCHLORIDE 5 MG: 5 TABLET ORAL at 05:38

## 2025-06-21 RX ADMIN — ACETAMINOPHEN 975 MG: 325 TABLET ORAL at 09:15

## 2025-06-21 RX ADMIN — ONDANSETRON 4 MG: 4 TABLET, ORALLY DISINTEGRATING ORAL at 05:38

## 2025-06-21 ASSESSMENT — ACTIVITIES OF DAILY LIVING (ADL)
ADLS_ACUITY_SCORE: 36
ADLS_ACUITY_SCORE: 39
ADLS_ACUITY_SCORE: 36
ADLS_ACUITY_SCORE: 36

## 2025-06-21 NOTE — PLAN OF CARE
No new changes to report. Pt ready to discharge, instructions given to  meds at discharge pharmacy. Awaiting family to  pt.

## 2025-06-23 ENCOUNTER — PATIENT OUTREACH (OUTPATIENT)
Dept: CARE COORDINATION | Facility: CLINIC | Age: 65
End: 2025-06-23
Payer: COMMERCIAL

## 2025-06-23 ENCOUNTER — PATIENT OUTREACH (OUTPATIENT)
Dept: NEUROSURGERY | Facility: CLINIC | Age: 65
End: 2025-06-23
Payer: COMMERCIAL

## 2025-06-23 NOTE — PROGRESS NOTES
Pt s/p stealth assisted Left side deep brain stimulator placement, phase I & II combined, placement of left side deep brain stimulator electrode, target left ventral intermediate nucleus of the thalamus, with microelectrode recording and connection to existing generator/battery by Dr. Schumacher on 6/20/25. Left VM to check on pt's postop status and requested a call back at pt's convenience. Will continue to follow up.

## 2025-06-23 NOTE — PROGRESS NOTES
Clinic Care Coordination Contact  Transitions of Care Outreach    Chief Complaint   Patient presents with    Clinic Care Coordination - Post Hospital       Most Recent Admission Date: 6/20/2025   Most Recent Admission Diagnosis: Essential tremor - G25.0  S/P deep brain stimulator placement - Z96.89     Most Recent Discharge Date: 6/21/2025   Most Recent Discharge Diagnosis: S/P deep brain stimulator placement - Z96.89  Postoperative state - Z98.890     Transitions of Care Assessment    Discharge Assessment  How are you doing now that you are home?: Pt stated that she has pain - head area, reviewed AVS with Pt, and notes in chart by RN, informed Pt that RN-CC called her today for post discharge assessment, and lvm. will be better to communicate with neurosurgery clinic. Pt is planning to return the call and call clinic.  How are your symptoms? (Red Flag symptoms escalate to triage hotline per guidelines): Unchanged  Do you know how to contact your clinic care team if you have future questions or changes to your health status? : Yes  Does the patient have their discharge instructions? : Yes  Does the patient have questions regarding their discharge instructions? : No  Were you started on any new medications or were there changes to any of your previous medications? : Yes  Does the patient have all of their medications?: Yes  Do you have questions regarding any of your medications? : No  Do you have all of your needed medical supplies or equipment (DME)?  (i.e. oxygen tank, CPAP, cane, etc.): Yes    Post-op ([facility] Only)  If the patient had a surgery or procedure, do they have any questions for a nurse?: No         Follow up Plan     Future Appointments   Date Time Provider Department Center   7/7/2025  8:45 AM Hermann Schumacher MD [facility]   7/24/2025  9:20 AM [id number]   7/24/2025 10:00 AM Fish Harrington MD [facility]   8/18/2025  2:30 PM Jessika Moon MD [facility]    9/15/2025   8:30 AM Jessika Moon MD [facility]       Outpatient Plan as outlined on AVS reviewed with patient.      For any urgent concerns, please contact our 24 hour nurse triage line: [phone]       MELISA Chu  [facility]

## 2025-06-24 ENCOUNTER — TELEPHONE (OUTPATIENT)
Dept: NEUROLOGY | Facility: CLINIC | Age: 65
End: 2025-06-24
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
## 2025-06-24 NOTE — PROGRESS NOTES
Neurosurgery Discharge Coordination Note     Attending physician: Dr. Schumacher  Surgery performed:  Stealth assisted Left side deep brain stimulator placement, phase I & II combined, placement of left side deep brain stimulator electrode, target left ventral intermediate nucleus of the thalamus, with microelectrode recording and connection to existing generator/battery,   Date of Discharge: 6/21/25  Discharge to: Home     Current status: Patient states she is very sore and tired since surgery. She has incision pain at lead site and parietal/connection site. Pt also mentioned that she is also sore from board being dropped on her (see Dr. Reyes's 6/20 note). Taking extra strength Tylenol with adequate relief. Pt thinks she may have a bump from the board. We discussed that if she feels a bump near the lead site incision, it may be the bur hold cover site, as the profile does not match the contour of the skull. This is normal. If it's near the connector site, she may be feeling the connector. She will try to take a photo and upload it to [employer]. Denies redness, swelling, increased tenderness, drainage, incisions opening or elevated temp. Denies current bowel or bladder issues.    Discharge instructions and medications reviewed with patient.  Follow up appointments/imaging/tests needed: 2 week post op with Dr. Schumacher on 7/7/25. Will message  to f/u with pt.     RN triage/on call number given: [phone]/ [phone]  Transitions of Care Outreach  Chief Complaint   Patient presents with    Clinic Care Coordination - Post Hospital       Most Recent Admission Date: 6/20/2025   Most Recent Admission Diagnosis: Essential tremor - G25.0  S/P deep brain stimulator placement - Z96.89     Most Recent Discharge Date: 6/21/2025   Most Recent Discharge Diagnosis: S/P deep brain stimulator placement - Z96.89  Postoperative state - Z98.890     Transitions of Care Assessment    Discharge Assessment  How are your symptoms? (Red  Flag symptoms escalate to triage hotline per guidelines): Improved  Do you know how to contact your clinic care team if you have future questions or changes to your health status? : Yes  Does the patient have their discharge instructions? : Yes  Does the patient have questions regarding their discharge instructions? : No  Were you started on any new medications or were there changes to any of your previous medications? : Yes  Does the patient have all of their medications?: Yes  Do you have questions regarding any of your medications? : No  Do you have all of your needed medical supplies or equipment (DME)?  (i.e. oxygen tank, CPAP, cane, etc.): Yes    Post-op ([facility] Only)  If the patient had a surgery or procedure, do they have any questions for a nurse?: No    Post-op (Clinicians Only)  Did the patient have surgery or a procedure: Yes  Incision: closed, healing  Drainage: No  Bleeding: none  Fever: No  Chills: No  Redness: No  Warmth: No  Swelling: No  Incision site pain: Yes  Closure: suture, dissolving  Eating & Drinking: eating and drinking without complaints/concerns  PO Intake: regular diet  Bowel Function: normal  Urinary Status: voiding without complaint/concerns      Follow up Plan     Discharge Follow-Up  Discharge follow up appointment scheduled in alignment with recommended follow up timeframe or Transitions of Risk Category? (Low = within 30 days; Moderate= within 14 days; High= within 7 days): Yes  Discharge Follow Up Appointment Date: 07/07/25  Discharge Follow Up Appointment Scheduled with?: Specialty Care Provider    Future Appointments   Date Time Provider Department Center   7/7/2025  8:45 AM Hermann Schumacher MD [facility]   8/18/2025  2:30 PM Jessika Moon MD [facility]   9/15/2025  8:30 AM Jessika Moon MD [facility]       Outpatient Plan as outlined on AVS reviewed with patient.    For any urgent concerns, please contact our 24 hour nurse triage line: [phone]  ([phone])       TIA CHANCE RN

## 2025-06-25 NOTE — TELEPHONE ENCOUNTER
Left the patient a detailed message that Dr. Moon said she would fit the patient in on 7/24/25, but in [place]. Informed the patient she would need to check in at 10:45 AM, for a CT head and then check-in at 11:45 AM for her 2nd initial programming with Dr. Moon. Informed the patient this information will be sent to her on [employer] and to for the patient to review this information and call the writer back if she had any questions.

## 2025-07-03 ENCOUNTER — MYC MEDICAL ADVICE (OUTPATIENT)
Dept: NEUROLOGY | Facility: CLINIC | Age: 65
End: 2025-07-03
Payer: COMMERCIAL

## 2025-07-03 ENCOUNTER — MYC MEDICAL ADVICE (OUTPATIENT)
Dept: NEUROSURGERY | Facility: CLINIC | Age: 65
End: 2025-07-03

## 2025-07-03 DIAGNOSIS — Z96.89 S/P DEEP BRAIN STIMULATOR PLACEMENT: ICD-10-CM

## 2025-07-03 DIAGNOSIS — G25.0 ESSENTIAL TREMOR: Primary | ICD-10-CM

## 2025-07-03 NOTE — TELEPHONE ENCOUNTER
Returned call to pt re her [employer] message saying her insurance changed as of 7/1/25 to an [employer] plan we don't accept. Pt said she was told by someone within [facility] that she would have to cancel her appt with Dr. Schumacher. I let pt know that she is within the global period of her surgery, meaning that the postop appointment with Dr. Schumacher is considered part and parcel of the surgery, so there should be no issue with keeping the appointment.     Pt expressed understanding and has no further questions at this time.

## 2025-07-07 ENCOUNTER — OFFICE VISIT (OUTPATIENT)
Dept: NEUROSURGERY | Facility: CLINIC | Age: 65
End: 2025-07-07
Payer: COMMERCIAL

## 2025-07-07 VITALS
DIASTOLIC BLOOD PRESSURE: 80 MMHG | RESPIRATION RATE: 16 BRPM | HEART RATE: 112 BPM | SYSTOLIC BLOOD PRESSURE: 118 MMHG | OXYGEN SATURATION: 97 %

## 2025-07-07 DIAGNOSIS — G25.0 ESSENTIAL TREMOR: ICD-10-CM

## 2025-07-07 DIAGNOSIS — Z96.89 STATUS POST DEEP BRAIN STIMULATOR PLACEMENT: Primary | ICD-10-CM

## 2025-07-07 NOTE — LETTER
7/7/2025       RE: Aida Zhong  [address]  [facility] [address]     Dear Colleague,    Thank you for referring your patient, Aida Zhong, to the [facility] NEUROSURGERY CLINIC [place] at [facility]. Please see a copy of my visit note below.    NEUROSURGERY    HISTORY AND PHYSICAL EXAM    Chief Complaint   Patient presents with     Follow Up     Wound check.  S/p left side deep brain stimulator placement, phase I and II combined, placement of deep brain stimulator electrode, target left ventral intermediate nucleus of the thalamus, with microelectrode recording and connection to the existing generator/battery on 6/20/2025.       HISTORY OF PRESENT ILLNESS  Ms. Aida Zhong returns today for her follow-up and her wound check.  She is s/p right side deep brain stimulator placement, phase I, placement of right side deep brain stimulator electrode, target right ventral intermediate nucleus of the thalamus with microelectrode recording on 5/13/2025, s/p deep brain stimulator placement, phase II, placement of deep brain stimulator generator/battery over the right chest wall on 5/22/2025, and most recently s/p left side deep brain stimulator placement, phase I and II combined, placement of deep brain stimulator electrode, target left ventral intermediate nucleus of the thalamus, with microelectrode recording and connection to the existing generator/battery on 6/20/2025.  Patient tolerated the phase I and II combined procedure well and there were no complications.  She did have an event where she was getting her skull x-ray in the PACU when the x-ray plate fell on her head and hit her.  She was getting a lateral x-ray of the skull to evaluate the hardware and the plate was placed on the left side of her head.  Apparently, she was holding it and she fell asleep.  The plate then fell onto her left side of her head.  The incident was reported on the  "[employer] system.  We did get a postop CT head which demonstrated typical postoperative findings.  No intracranial hemorrhage was noted.  She got a follow up CT head on POD#1 and that was also stable.  However, the radiologist read \"Questionable trace extra-axial subdural collection along the left frontal convexity.\" And she had a question regarding this read.  She was discharged to home on POD#1.  Patient did report that she felt \"out of it\" after her surgery.  Patient reports that she is doing well and there are no complaints.  She reports no pain other than some tenderness at the surgical sites.  She denies any fevers, chills, vomiting, dizziness, weakness, numbness or seizure like activity.  She reports that the incisions are looking good and there is no redness, no drainage and no fluid collection.      In summary, Ms. Aida Zhong is a 65 year old left handed female with a diagnosis of essential tremor.  Patient states that her tremor began when she was 13 years old.  She was at [employer] and people noted her tremor when she was lighting a candle.  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  She was ultimately diagnosed with essential tremor in 2014.  Her tremor has progressed and it is now interfering with drinking and eating.  She has seen Dr. Moon who is recommending DBS workup.  Patient states that she is very familiar with DBS and she has been \"following\" the therapy and its development for quite some time.  Her goal for DBS is to better control her tremor, to be able to eat without spilling and to put makeup on without poking herself in the eye.  She also would like to be able to play with her grandchildren without having tremor.  Her tremor is bilateral and she would like her left side treated first.  She would like a rechargeable generator/battery and her first choice is [employer], followed by [employer] being the second choice.  We discussed the potential " outcome of the DBS candidacy evaluation.  Upon completion of the evaluation, patient's case will be dicussed at the Movement Disorder Consensus Group Meeting.  Patient may not be considered to be a good candidate.  If that is the case, the group will provide a reason for our recommendation against DBS.  Patient may also be considered to be a good candidate and wait and see strategy may be recommended.  Patient may also be considered to be a good candidate and staged bilateral strategy may be recommended.  We discussed both phase I and II of DBS surgery.  We discussed that during phase I, we would place the DBS electrode on the right side under MAC and microelectrode recording.  She would then return one week later for the phase II with placement of the DBS generator/battery over the right chest wall under general anesthesia.  If she is a unilateral candidate or wait and see strategy candidate, then she will undergo another evaluation/discussion prior to proceeding to the left side implantation.  If she is a bilateral candidate in a staged fashion, she would return three weeks later for the phase I and II combined for the placement of the DBS electrode on the left side under MAC and microelectrode recording followed by connection to the previously implanted generator/battery.  Risks, benefits, alternative therapies were discussed with the patient, including but not limited to infection and bleeding (intracranial), injury to the brain, stroke, seizure, death, hardware failure and possible need for more surgeries.  Surgical procedure was discussed in detail.  All questions were answered, and she expressed understanding.  Her case was discussed at the Movement Disorder Consensus Group Meeting and she was considered to be a good candidate for DBS surgery.  Recommendation was staged bilateral protocol, right side implantation first, target right then left Vim, and [employer] device with rechargeable generator/battery over  the right chest wall.        Past Medical History:   Diagnosis Date     Depression      Essential tremor      HLD (hyperlipidemia)      PONV (postoperative nausea and vomiting)        Past Surgical History:   Procedure Laterality Date     ABDOMINOPLASTY       BROW LIFT       DILATION AND CURETTAGE       HC ARTHROSCOPIC REMOVAL GANGLION CYST-WRIST/HAND Right      HC ENLARGE BREAST WITH IMPLANT       HC REMOVAL OF BREAST IMPLANT       HYSTERECTOMY       IMPLANT DEEP BRAIN STIMULATION GENERATOR / BATTERY Right 5/22/2025    Procedure: Deep brain stimulator placement, phase II, placement of deep brain stimulator generator/battery over the right chest wall;  Surgeon: Hermann Schumacher MD;  Location: [facility] OR     OPTICAL TRACKING SYSTEM INSERTION DEEP BRAIN STIMULATION Right 5/13/2025    Procedure: Stealth Right side deep brain stimulator placement, phase I, placement of right side deep brain stimulator electrode, target right ventral intermediate nucleus of the thalamus, with microelectrode recording;  Surgeon: Hermann Schumacher MD;  Location: [facility] OR     OPTICAL TRACKING SYSTEM INSERTION DEEP BRAIN STIMULATION Left 6/20/2025    Procedure: stealth assisted Left side deep brain stimulator placement, phase I & II combined, placement of left side deep brain stimulator electrode, target left ventral intermediate nucleus of the thalamus, with microelectrode recording and connection to existing generator/battery;  Surgeon: Hermann Schumacher MD;  Location: [facility] OR     TONSILLECTOMY         Social History     Socioeconomic History     Marital status:      Spouse name: Not on file     Number of children: Not on file     Years of education: Not on file     Highest education level: Not on file   Occupational History     Not on file   Tobacco Use     Smoking status: Never     Smokeless tobacco: Never   Vaping Use     Vaping status: Never Used   Substance and Sexual Activity     Alcohol use: Not Currently     Drug  use: Never     Sexual activity: Not on file   Other Topics Concern     Not on file   Social History Narrative     Not on file     Social Drivers of Health     Financial Resource Strain: Low Risk  (6/20/2025)    Financial Resource Strain      Within the past 12 months, have you or your family members you live with been unable to get utilities (heat, electricity) when it was really needed?: No   Recent Concern: Financial Resource Strain - Medium Risk (3/28/2025)    [facility]    Financial Resource Strain      Difficulty of Paying Living Expenses: 2      Difficulty of Paying Living Expenses: 1   Food Insecurity: High Risk (6/20/2025)    Food Insecurity      Within the past 12 months, did you worry that your food would run out before you got money to buy more?: Yes      Within the past 12 months, did the food you bought just not last and you didn t have money to get more?: Yes   Transportation Needs: High Risk (6/20/2025)    Transportation Needs      Within the past 12 months, has lack of transportation kept you from medical appointments, getting your medicines, non-medical meetings or appointments, work, or from getting things that you need?: Yes   Physical Activity: Not on file   Stress: Not on file   Social Connections: Socially Integrated (3/28/2025)    Received from [employer] [facility]    Social Connections      Do you often feel lonely or isolated from those around you?: 0   Interpersonal Safety: Low Risk  (6/20/2025)    Interpersonal Safety      Do you feel physically and emotionally safe where you currently live?: Yes      Within the past 12 months, have you been hit, slapped, kicked or otherwise physically hurt by someone?: No      Within the past 12 months, have you been humiliated or emotionally abused in other ways by your partner or ex-partner?: No   Housing Stability: Low Risk  (6/20/2025)    Housing Stability      Do you have  housing? : Yes      Are you worried about losing your housing?: No   Recent Concern: Housing Stability - Medium Risk (3/28/2025)    [facility] from [employer] [facility]    Housing Stability      What is your housing situation today?: 2       Family History   Problem Relation Age of Onset     Tremor Mother      Tremor Sister      Tremor Maternal Grandfather      Tremor Daughter      Tremor Daughter      Anesthesia Reaction No family hx of      Deep Vein Thrombosis (DVT) No family hx of        Current Outpatient Medications   Medication Sig Dispense Refill     FLUoxetine (PROZAC) 20 MG capsule Take 20 mg by mouth at bedtime.       FLUoxetine (PROZAC) 40 MG capsule Take 40 mg by mouth at bedtime.       methylphenidate (RITALIN) 10 MG tablet Take 15 mg by mouth 2 times daily. Takes at 0700 and 1200       Multiple Vitamins-Minerals (EQ MULTIVITAMINS ADULT GUMMY PO) Take 2 chew tab by mouth daily. Pt rotates between gummies and tablets       rosuvastatin (CRESTOR) 5 MG tablet Take 1 tablet by mouth at bedtime       ondansetron (ZOFRAN ODT) 4 MG ODT tab Take 1 tablet (4 mg) by mouth every 6 hours as needed for nausea. 12 tablet 1     oxyCODONE (ROXICODONE) 5 MG tablet Take 1 tablet (5 mg) by mouth every 6 hours as needed for moderate to severe pain. 12 tablet 0     No current facility-administered medications for this visit.       Allergies   Allergen Reactions     Penicillins Rash       REVIEW OF SYSTEMS: Also per HPI.  General: Negative for chills/sweats/fever. difficulty sleeping, headache, recent fatigue, or weight gain/loss.  Eyes: Negative for blurred vision, crossed eyes, double vision, recent eye infections, vision flashes, or vision halos.  Ears/Nose/Mouth/Throat: Negative for bleeding gums, difficulty swallowing, earache, ear discharge, hearing loss, hoarseness, nosebleeds, tinnitus, or sinus problems.  Respiratory: POSITIVE for ELVIA.  Negative for chronic cough, coughing blood, night sweats,  shortness of breath, Tuberculosis, or wheezing.  Cardiovascular: Negative for chest pain, dyspnea at night, heart murmur, palpitations, pacemaker, pacemaker, poor circulation, swollen legs/feet, or varicose veins.  Gastrointestinal: Negative for melena, hematochezia, chronic diarrhea, heartburn, Hepatitis A/B/C, increasing constipation, Liver Disease, nausea, or vomiting.   Genitourinary: Negative for Urinary retention, genital discharge, urinary incontinence, urgency, or UTI.   Neurological: POSITIVE for essential tremor.  Negative for syncope, headaches, numbness of arms/legs, tingling in hands/arms/legs, memory problems, or seizures.  Psychological: POSITIVE for depression. Negative for anxiety, panic attacks, or restlessness.  Skin: Negative for chronic skin itching, color changes in hand/feet when cold, poor scarring, non-healing ulcers, skin rashes/hives, unusual moles.  Musculoskeletal: Negative for arthritis, joint swelling in hands/wrists/hips/knees/joints, muscle tenderness in arms/legs, or osteoporosis.  Endocrine: Negative for excessive thirst/hunger, intolerance for warm rooms, loss of libido, multiple broken bones, rapid weight gain/loss, galactorrhea, or thyroid issues.  Hematologic/Lymphatic: Negative for easy skin bruising, significant fatigue, prolonged bleeding, tender glands/lymph nodes.  Allergies: Negative for asthma or hay fever.      PHYSICAL EXAM  /80 (BP Location: Right arm, Patient Position: Sitting)   Pulse 112   Resp 16   SpO2 97%     General: Awake, alert, oriented. Well nourished, well developed, she is not in any acute distress.  HEENT: Head normocephalic, atraumatic. No carotid bruit. Neck supple. Good range of motion. No palpable thyroid mass.  Extremity: Warm with no clubbing or cyanosis. No lower extremity edema.    Incisions: Left frontal incision is healing well.  No redness.  No fluid collection.  No drainage.  Remaining skin glue dressing and Monocryl sutures were  removed without any issues.  Right parietal incision is healing well.  No redness.  No fluid collection.  No drainage.  Remaining skin glue dressing and Monocryl sutures were removed without any issues.    Neurological  Awake, alert and oriented to date, time, place and person.  Speech is fluent.   Face symmetric.    Motor: full strength throughout.  Sensation: intact to light touch and pinpoint.      IMAGING  CT head without contrast 6/20/2025 3:13 PM  FINDINGS:  Bilateral frontal approach deep brain stimulator leads. Small volume expected pneumocephalus along the left frontal convexity. The descending leads terminate within the inferior thalami. No significant tract associated hemorrhage. Trace extra-axial fluid is partially scattered by streak artifact, expected. Trace air within the cavernous sinuses presumably from peripheral vascular access. No acute loss of gray-white matter differentiation in the cerebral hemispheres. Ventricles are proportionate to the cerebral sulci. Clear basal cisterns.    The bony calvaria and the bones of the skull base are normal. The visualized portions of the paranasal sinuses and mastoid air cells are clear. Grossly normal orbits.                                                             IMPRESSION: Bifrontal approach deep brain stimulator leads terminate within the inferior thalami. Small volume pneumocephalus along the left frontal convexity which is expected. No acute intracranial abnormality.      CT head without contrast 6/21/2025 3:13 PM  FINDINGS:  Bifrontal approach deep brain stimulator leads, terminating in the inferior thalami. Evaluation is somewhat limited by streak artifact. Similar-appearing small volume pneumocephalus along the left frontal convexity. Questionable trace extra-axial subdural collection along the left frontal convexity. No mass effect or midline shift. No acute  loss of grey-white matter differentiation or hydrocephalus. Basal cisterns are  clear.    Bifrontal jean holes. Right petrous apex pneumatization. No substantial paranasal sinus mucosal disease. Clear mastoid air cells. Normal orbits.                                                                IMPRESSION:  Bifrontal approach deep brain stimulator leads terminate within the inferior thalami. Unchanged small volume pneumocephalus along the left frontal convexity. Questionable trace extra-axial subdural collection along the left frontal convexity, however, evaluation is limited by streak artifact.       ASSESSMENT   65 year old left handed female  Essential tremor  Bilateral tremor, right worse than left  Status post right side deep brain stimulator placement, phase I, placement of right side deep brain stimulator electrode, target right ventral intermediate nucleus of the thalamus with microelectrode recording on 5/13/2025  Status post deep brain stimulator placement, phase II, placement of deep brain stimulator generator/battery over the right chest wall on 5/22/2025  Status post left side deep brain stimulator placement, phase I and II combined, placement of deep brain stimulator electrode, target left ventral intermediate nucleus of the thalamus, with microelectrode recording and connection to the existing generator/battery on 6/20/2025      Patient is recovering well from the recent DBS surgeries.  Her incisions are healing well with no signs of infection.  She is doing well overall.      We had discussion regarding several issues.    1.  Return to work and restrictions  She is OK to drive, as long as she is not taking any opioids.  There are no activity restrictions at this time.  She may return to work without any restrictions on 8/4/2025.  We gave her a letter stating this.    2.  Incident that happened in PACU.  She did receive a letter from the hospital regarding the incident but she was not happy with the letter.  I told her that we reported the event so that the hospital could look  "into it.  She stated that she did not receive an apology and there was \"no accountability\".  I apologized to her that this incident happened on behalf of the hospital.  I reassured her that there was no sequela from this incident in terms of her head/brain.  She stated that she was \"out of it\" postoperatively and I told her that this is more likely secondary to the surgery itself.    3.  CT head radiology read.  Patient sent a [employer] message regarding the official radiology read from the POD#1.  This was second of the series of CT head that was done during the last admission.  The official read in the findings was \"Questionable trace extra-axial subdural collection along the left frontal convexity.\"  I told her that I went to the neuro-radiology reading room to discuss this with the neuro-radiologist and they could not really point out where this questionable finding was.  It may be a thin hyperdense area adjacent to where the electrode is entering the brain but that could be an artifact or it could be the dura.  I pointed the area out to the patient and explained to her that there were actually no concerning findings that I could see.  I answered her questions.    4.  Change in her insurance and hospital coverage.  Patient had change in her insurance and as of 7/1/2025, we are no longer in her network.  She has to find a provider at [facility] system.  I reassured her that her visit with me today is within the global period so that should not be a problem.  She is scheduled to follow up with Dr. Herring for programming.  I reassured her that Dr. Herring received her training at [facility] and [facility] and she was an attending her at [facility] until her new position at [facility].  I told the patient that she is in good hands.      PLAN  1.  Follow up with neurosurgery as needed.      41 minutes were spent face to face with the patient of which more than 50% of the time was spent counseling and discussing the above issues regarding " diagnosis, differential, treatment options, and steps for further evaluation.  5 minutes were spent reviewing patient's chart, imaging in PACS.  20 minutes were spent on documentation for this encounter.  66 minutes total were spent on this encounter today.    Again, thank you for allowing me to participate in the care of your patient.      Sincerely,    Hermann Schumacher MD

## 2025-07-07 NOTE — TELEPHONE ENCOUNTER
Messaged pt Dr. Lopez no [facility].     Ashia Cope MA

## 2025-07-07 NOTE — PROGRESS NOTES
"NEUROSURGERY    HISTORY AND PHYSICAL EXAM    Chief Complaint   Patient presents with    Follow Up     Wound check.  S/p left side deep brain stimulator placement, phase I and II combined, placement of deep brain stimulator electrode, target left ventral intermediate nucleus of the thalamus, with microelectrode recording and connection to the existing generator/battery on 6/20/2025.       HISTORY OF PRESENT ILLNESS  Ms. Aida Zhong returns today for her follow-up and her wound check.  She is s/p right side deep brain stimulator placement, phase I, placement of right side deep brain stimulator electrode, target right ventral intermediate nucleus of the thalamus with microelectrode recording on 5/13/2025, s/p deep brain stimulator placement, phase II, placement of deep brain stimulator generator/battery over the right chest wall on 5/22/2025, and most recently s/p left side deep brain stimulator placement, phase I and II combined, placement of deep brain stimulator electrode, target left ventral intermediate nucleus of the thalamus, with microelectrode recording and connection to the existing generator/battery on 6/20/2025.  Patient tolerated the phase I and II combined procedure well and there were no complications.  She did have an event where she was getting her skull x-ray in the PACU when the x-ray plate fell on her head and hit her.  She was getting a lateral x-ray of the skull to evaluate the hardware and the plate was placed on the left side of her head.  Apparently, she was holding it and she fell asleep.  The plate then fell onto her left side of her head.  The incident was reported on the [facility] system.  We did get a postop CT head which demonstrated typical postoperative findings.  No intracranial hemorrhage was noted.  She got a follow up CT head on POD#1 and that was also stable.  However, the radiologist read \"Questionable trace extra-axial subdural collection along the left frontal convexity.\" And she " "had a question regarding this read.  She was discharged to home on POD#1.  Patient did report that she felt \"out of it\" after her surgery.  Patient reports that she is doing well and there are no complaints.  She reports no pain other than some tenderness at the surgical sites.  She denies any fevers, chills, vomiting, dizziness, weakness, numbness or seizure like activity.  She reports that the incisions are looking good and there is no redness, no drainage and no fluid collection.      In summary, Ms. Aida Zhong is a 65 year old left handed female with a diagnosis of essential tremor.  Patient states that her tremor began when she was 13 years old.  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  She was ultimately diagnosed with essential tremor in 2014.  Her tremor has progressed and it is now interfering with drinking and eating.  She has seen Dr. Moon who is recommending DBS workup.  Patient states that she is very familiar with DBS and she has been \"following\" the therapy and its development for quite some time.  Her goal for DBS is to better control her tremor, to be able to eat without spilling and to put makeup on without poking herself in the eye.  She also would like to be able to play with her grandchildren without having tremor.  Her tremor is bilateral and she would like her left side treated first.  She would like a rechargeable generator/battery and her first choice is [employer], followed by [employer] being the second choice.  We discussed the potential outcome of the DBS candidacy evaluation.  Upon completion of the evaluation, patient's case will be dicussed at the Movement Disorder Consensus Group Meeting.  Patient may not be considered to be a good candidate.  If that is the case, the group will provide a reason for our recommendation against DBS.  Patient may also be " considered to be a good candidate and wait and see strategy may be recommended.  Patient may also be considered to be a good candidate and staged bilateral strategy may be recommended.  We discussed both phase I and II of DBS surgery.  We discussed that during phase I, we would place the DBS electrode on the right side under MAC and microelectrode recording.  She would then return one week later for the phase II with placement of the DBS generator/battery over the right chest wall under general anesthesia.  If she is a unilateral candidate or wait and see strategy candidate, then she will undergo another evaluation/discussion prior to proceeding to the left side implantation.  If she is a bilateral candidate in a staged fashion, she would return three weeks later for the phase I and II combined for the placement of the DBS electrode on the left side under MAC and microelectrode recording followed by connection to the previously implanted generator/battery.  Risks, benefits, alternative therapies were discussed with the patient, including but not limited to infection and bleeding (intracranial), injury to the brain, stroke, seizure, death, hardware failure and possible need for more surgeries.  Surgical procedure was discussed in detail.  All questions were answered, and she expressed understanding.  Her case was discussed at the Movement Disorder Consensus Group Meeting and she was considered to be a good candidate for DBS surgery.  Recommendation was staged bilateral protocol, right side implantation first, target right then left Vim, and [employer] device with rechargeable generator/battery over the right chest wall.        Past Medical History:   Diagnosis Date    Depression     Essential tremor     HLD (hyperlipidemia)     PONV (postoperative nausea and vomiting)        Past Surgical History:   Procedure Laterality Date    ABDOMINOPLASTY      BROW LIFT      DILATION AND CURETTAGE      HC ARTHROSCOPIC REMOVAL  GANGLION CYST-WRIST/HAND Right     HC ENLARGE BREAST WITH IMPLANT      HC REMOVAL OF BREAST IMPLANT      HYSTERECTOMY      IMPLANT DEEP BRAIN STIMULATION GENERATOR / BATTERY Right 5/22/2025    Procedure: Deep brain stimulator placement, phase II, placement of deep brain stimulator generator/battery over the right chest wall;  Surgeon: Hermann Schumacher MD;  Location: [facility] OR    OPTICAL TRACKING SYSTEM INSERTION DEEP BRAIN STIMULATION Right 5/13/2025    Procedure: Stealth Right side deep brain stimulator placement, phase I, placement of right side deep brain stimulator electrode, target right ventral intermediate nucleus of the thalamus, with microelectrode recording;  Surgeon: Hermann Schumacher MD;  Location: [facility] OR    OPTICAL TRACKING SYSTEM INSERTION DEEP BRAIN STIMULATION Left 6/20/2025    Procedure: stealth assisted Left side deep brain stimulator placement, phase I & II combined, placement of left side deep brain stimulator electrode, target left ventral intermediate nucleus of the thalamus, with microelectrode recording and connection to existing generator/battery;  Surgeon: Hermann Schumacher MD;  Location: [facility] OR    TONSILLECTOMY         Social History     Socioeconomic History    Marital status:      Spouse name: Not on file    Number of children: Not on file    Years of education: Not on file    Highest education level: Not on file   Occupational History    Not on file   Tobacco Use    Smoking status: Never    Smokeless tobacco: Never   Vaping Use    Vaping status: Never Used   Substance and Sexual Activity    Alcohol use: Not Currently    Drug use: Never    Sexual activity: Not on file   Other Topics Concern    Not on file   Social History Narrative    Not on file     Social Drivers of Health     Financial Resource Strain: Low Risk  (6/20/2025)    Financial Resource Strain     Within the past 12 months, have you or your family members you live with been unable to get utilities (heat,  electricity) when it was really needed?: No   Recent Concern: Financial Resource Strain - Medium Risk (3/28/2025)    [facility]    Financial Resource Strain     Difficulty of Paying Living Expenses: 2     Difficulty of Paying Living Expenses: 1   Food Insecurity: High Risk (6/20/2025)    Food Insecurity     Within the past 12 months, did you worry that your food would run out before you got money to buy more?: Yes     Within the past 12 months, did the food you bought just not last and you didn t have money to get more?: Yes   Transportation Needs: High Risk (6/20/2025)    Transportation Needs     Within the past 12 months, has lack of transportation kept you from medical appointments, getting your medicines, non-medical meetings or appointments, work, or from getting things that you need?: Yes   Physical Activity: Not on file   Stress: Not on file   Social Connections: Socially Integrated (3/28/2025)    Received from [facility]    Social Connections     Do you often feel lonely or isolated from those around you?: 0   Interpersonal Safety: Low Risk  (6/20/2025)    Interpersonal Safety     Do you feel physically and emotionally safe where you currently live?: Yes     Within the past 12 months, have you been hit, slapped, kicked or otherwise physically hurt by someone?: No     Within the past 12 months, have you been humiliated or emotionally abused in other ways by your partner or ex-partner?: No   Housing Stability: Low Risk  (6/20/2025)    Housing Stability     Do you have housing? : Yes     Are you worried about losing your housing?: No   Recent Concern: Housing Stability - Medium Risk (3/28/2025)    Received from [facility]    Housing Stability     What is your housing situation today?: 2       Family History   Problem Relation Age of Onset    Tremor Mother     Tremor Sister     Tremor Maternal  Grandfather     Tremor Daughter     Tremor Daughter     Anesthesia Reaction No family hx of     Deep Vein Thrombosis (DVT) No family hx of        Current Outpatient Medications   Medication Sig Dispense Refill    FLUoxetine (PROZAC) 20 MG capsule Take 20 mg by mouth at bedtime.      FLUoxetine (PROZAC) 40 MG capsule Take 40 mg by mouth at bedtime.      methylphenidate (RITALIN) 10 MG tablet Take 15 mg by mouth 2 times daily. Takes at 0700 and 1200      Multiple Vitamins-Minerals (EQ MULTIVITAMINS ADULT GUMMY PO) Take 2 chew tab by mouth daily. Pt rotates between gummies and tablets      rosuvastatin (CRESTOR) 5 MG tablet Take 1 tablet by mouth at bedtime      ondansetron (ZOFRAN ODT) 4 MG ODT tab Take 1 tablet (4 mg) by mouth every 6 hours as needed for nausea. 12 tablet 1    oxyCODONE (ROXICODONE) 5 MG tablet Take 1 tablet (5 mg) by mouth every 6 hours as needed for moderate to severe pain. 12 tablet 0     No current facility-administered medications for this visit.       Allergies   Allergen Reactions    Penicillins Rash       REVIEW OF SYSTEMS: Also per HPI.  General: Negative for chills/sweats/fever. difficulty sleeping, headache, recent fatigue, or weight gain/loss.  Eyes: Negative for blurred vision, crossed eyes, double vision, recent eye infections, vision flashes, or vision halos.  Ears/Nose/Mouth/Throat: Negative for bleeding gums, difficulty swallowing, earache, ear discharge, hearing loss, hoarseness, nosebleeds, tinnitus, or sinus problems.  Respiratory: POSITIVE for ELVIA.  Negative for chronic cough, coughing blood, night sweats, shortness of breath, Tuberculosis, or wheezing.  Cardiovascular: Negative for chest pain, dyspnea at night, heart murmur, palpitations, pacemaker, pacemaker, poor circulation, swollen legs/feet, or varicose veins.  Gastrointestinal: Negative for melena, hematochezia, chronic diarrhea, heartburn, Hepatitis A/B/C, increasing constipation, Liver Disease, nausea, or vomiting.    Genitourinary: Negative for Urinary retention, genital discharge, urinary incontinence, urgency, or UTI.   Neurological: POSITIVE for essential tremor.  Negative for syncope, headaches, numbness of arms/legs, tingling in hands/arms/legs, memory problems, or seizures.  Psychological: POSITIVE for depression. Negative for anxiety, panic attacks, or restlessness.  Skin: Negative for chronic skin itching, color changes in hand/feet when cold, poor scarring, non-healing ulcers, skin rashes/hives, unusual moles.  Musculoskeletal: Negative for arthritis, joint swelling in hands/wrists/hips/knees/joints, muscle tenderness in arms/legs, or osteoporosis.  Endocrine: Negative for excessive thirst/hunger, intolerance for warm rooms, loss of libido, multiple broken bones, rapid weight gain/loss, galactorrhea, or thyroid issues.  Hematologic/Lymphatic: Negative for easy skin bruising, significant fatigue, prolonged bleeding, tender glands/lymph nodes.  Allergies: Negative for asthma or hay fever.      PHYSICAL EXAM  /80 (BP Location: Right arm, Patient Position: Sitting)   Pulse 112   Resp 16   SpO2 97%     General: Awake, alert, oriented. Well nourished, well developed, she is not in any acute distress.  HEENT: Head normocephalic, atraumatic. No carotid bruit. Neck supple. Good range of motion. No palpable thyroid mass.  Extremity: Warm with no clubbing or cyanosis. No lower extremity edema.    Incisions: Left frontal incision is healing well.  No redness.  No fluid collection.  No drainage.  Remaining skin glue dressing and Monocryl sutures were removed without any issues.  Right parietal incision is healing well.  No redness.  No fluid collection.  No drainage.  Remaining skin glue dressing and Monocryl sutures were removed without any issues.    Neurological  Awake, alert and oriented to date, time, place and person.  Speech is fluent.   Face symmetric.    Motor: full strength throughout.  Sensation: intact to  light touch and pinpoint.      IMAGING  CT head without contrast 6/20/2025 3:13 PM  FINDINGS:  Bilateral frontal approach deep brain stimulator leads. Small volume expected pneumocephalus along the left frontal convexity. The descending leads terminate within the inferior thalami. No significant tract associated hemorrhage. Trace extra-axial fluid is partially scattered by streak artifact, expected. Trace air within the cavernous sinuses presumably from peripheral vascular access. No acute loss of gray-white matter differentiation in the cerebral hemispheres. Ventricles are proportionate to the cerebral sulci. Clear basal cisterns.    The bony calvaria and the bones of the skull base are normal. The visualized portions of the paranasal sinuses and mastoid air cells are clear. Grossly normal orbits.                                                             IMPRESSION: Bifrontal approach deep brain stimulator leads terminate within the inferior thalami. Small volume pneumocephalus along the left frontal convexity which is expected. No acute intracranial abnormality.      CT head without contrast 6/21/2025 3:13 PM  FINDINGS:  Bifrontal approach deep brain stimulator leads, terminating in the inferior thalami. Evaluation is somewhat limited by streak artifact. Similar-appearing small volume pneumocephalus along the left frontal convexity. Questionable trace extra-axial subdural collection along the left frontal convexity. No mass effect or midline shift. No acute  loss of grey-white matter differentiation or hydrocephalus. Basal cisterns are clear.    Bifrontal jean holes. Right petrous apex pneumatization. No substantial paranasal sinus mucosal disease. Clear mastoid air cells. Normal orbits.                                                                IMPRESSION:  Bifrontal approach deep brain stimulator leads terminate within the inferior thalami. Unchanged small volume pneumocephalus along the left frontal  "convexity. Questionable trace extra-axial subdural collection along the left frontal convexity, however, evaluation is limited by streak artifact.       ASSESSMENT   65 year old left handed female  Essential tremor  Bilateral tremor, right worse than left  Status post right side deep brain stimulator placement, phase I, placement of right side deep brain stimulator electrode, target right ventral intermediate nucleus of the thalamus with microelectrode recording on 5/13/2025  Status post deep brain stimulator placement, phase II, placement of deep brain stimulator generator/battery over the right chest wall on 5/22/2025  Status post left side deep brain stimulator placement, phase I and II combined, placement of deep brain stimulator electrode, target left ventral intermediate nucleus of the thalamus, with microelectrode recording and connection to the existing generator/battery on 6/20/2025      Patient is recovering well from the recent DBS surgeries.  Her incisions are healing well with no signs of infection.  She is doing well overall.      We had discussion regarding several issues.    1.  Return to work and restrictions  She is OK to drive, as long as she is not taking any opioids.  There are no activity restrictions at this time.  She may return to work without any restrictions on 8/4/2025.  We gave her a letter stating this.    2.  Incident that happened in PACU.  She did receive a letter from the hospital regarding the incident but she was not happy with the letter.  I told her that we reported the event so that the hospital could look into it.  She stated that she did not receive an apology and there was \"no accountability\".  I apologized to her that this incident happened on behalf of the hospital.  I reassured her that there was no sequela from this incident in terms of her head/brain.  She stated that she was \"out of it\" postoperatively and I told her that this is more likely secondary to the surgery " "itself.    3.  CT head radiology read.  Patient sent a MyChart message regarding the official radiology read from the POD#1.  This was second of the series of CT head that was done during the last admission.  The official read in the findings was \"Questionable trace extra-axial subdural collection along the left frontal convexity.\"  I told her that I went to the neuro-radiology reading room to discuss this with the neuro-radiologist and they could not really point out where this questionable finding was.  It may be a thin hyperdense area adjacent to where the electrode is entering the brain but that could be an artifact or it could be the dura.  I pointed the area out to the patient and explained to her that there were actually no concerning findings that I could see.  I answered her questions.    4.  Change in her insurance and hospital coverage.  Patient had change in her insurance and as of 7/1/2025, we are no longer in her network.  [facility]  I reassured her that her visit with me today is within the global period so that should not be a problem.  She is scheduled to follow up with Dr. Herring for programming.  I reassured her that Dr. Herring received her training at [facility] and [facility] and she was an attending her at [facility] until her new position at [employer].  I told the patient that she is in good hands.      PLAN  1.  Follow up with neurosurgery as needed.      41 minutes were spent face to face with the patient of which more than 50% of the time was spent counseling and discussing the above issues regarding diagnosis, differential, treatment options, and steps for further evaluation.  5 minutes were spent reviewing patient's chart, imaging in PACS.  20 minutes were spent on documentation for this encounter.  66 minutes total were spent on this encounter today.  "

## 2025-07-08 ENCOUNTER — MYC MEDICAL ADVICE (OUTPATIENT)
Dept: NEUROLOGY | Facility: CLINIC | Age: 65
End: 2025-07-08
Payer: COMMERCIAL

## 2025-07-08 ENCOUNTER — PATIENT OUTREACH (OUTPATIENT)
Dept: CARE COORDINATION | Facility: CLINIC | Age: 65
End: 2025-07-08
Payer: COMMERCIAL

## 2025-07-08 NOTE — TELEPHONE ENCOUNTER
Order faxed to [facility] fax # [phone].    Spoke to pt yesterday - per pt opted to cancel future appts.

## 2025-07-08 NOTE — OP NOTE
PATIENT NAME: TRACY HORTON  YOB: 1960  MRN:   [id number]  ACCOUNT:  [id number]      DATE OF PROCEDURE:  06/20/2025    PREOPERATIVE DIAGNOSIS:    1.  Essential tremor  2.  Bilateral tremor, right worse than left  3.  Status post right side deep brain stimulator placement, phase I, placement of right side deep brain stimulator electrode, target right ventral intermediate nucleus of the thalamus with microelectrode recording on 5/13/2025  4.  Status post deep brain stimulator placement, phase II, placement of deep brain stimulator generator/battery over the right chest wall on 5/22/2025    POSTOPERATIVE DIAGNOSIS:  1.  Essential tremor  2.  Bilateral tremor, right worse than left  3.  Status post right side deep brain stimulator placement, phase I, placement of right side deep brain stimulator electrode, target right ventral intermediate nucleus of the thalamus with microelectrode recording on 5/13/2025  4.  Status post deep brain stimulator placement, phase II, placement of deep brain stimulator generator/battery over the right chest wall on 5/22/2025    PROCEDURES PERFORMED:   1.  Placement of CRW stereotactic head frame.  2.  Stereotactic neuronavigation planning CT of the head.  3.  Stereotactic neuronavigation using Curious Sense system for surgical planning, targeting and approach: target is left ventral intermediate nucleus of the thalamus (Vim).  4.  Left side deep brain stimulator placement, phase I, placement of left ventral intermediate nucleus of the thalamus (Vim).  5.  Use of intraoperative microelectrode recording.  6.  Use of intraoperative fluoroscopy.    7.  Deep brain stimulator placement, phase II, connection of the left side deep brain stimulator electrode, single array, to the existing generator/battery.  8.  Electrical interrogation and analysis of deep brain stimulator system.    ATTENDING SURGEON:  Hermann Schumacher MD, PhD     RESIDENT SURGEON:  Iveth Reyes MD    ANESTHESIA:   Monitored anesthesia care and local anesthetic.     ESTIMATED BLOOD LOSS:  10 mL.     COMPLICATIONS:  None.     FINDINGS:  Final electrode location, medial Jose G Gun position, at 1.0 mm above the target depth.  All impedance values were within normal.  No problems were found.    EXPLANTS:  Medtronic extension wire plug for left side, REF [id number], Lot# [id number].     IMPLANTS:  1.  Medtronic SenSight DBS electrode, REF [id number], S/N [id number], marked for left side.  2.  Medtronic SenSight jean hole cover, REF [id number], Lot# [id number]    INDICATIONS FOR PROCEDURE:  Ms. Aida Zhong returns today for her ongoing DBS surgery.  She is s/p right side deep brain stimulator placement, phase I, placement of right side deep brain stimulator electrode, target right ventral intermediate nucleus of the thalamus with microelectrode recording on 5/13/2025 and s/p deep brain stimulator placement, phase II, placement of deep brain stimulator generator/battery over the right chest wall on 5/22/2025.  Patient tolerated both of the procedures well and there were no complications.  After her phase I surgery, patient's postoperative CT head was without any concerning findings.  She was discharged to home on POD#1.  She did well with her phase II surgery.  Patient reports that she is doing well and there are no complaints.  She reports no pain other than some tenderness at the right parietal area.  She denies any fevers, chills, vomiting, dizziness, weakness, numbness or seizure like activity.  She did have nausea with oxycodone.  She reports that the incisions are looking good and there is no redness, no drainage and no fluid collection.  Her scalp, however, were very itchy.  Overall, she is quite happy with the recent surgery.  Her left hand tremor is reduced still.  In summary, Ms. Aida Zhong is a 65 year old left handed female with a diagnosis of essential tremor.  Patient states that her tremor began when she was 13 years old.  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  She was ultimately diagnosed with essential tremor in 2014.  Her tremor has progressed and it is now interfering with drinking and eating.  She has seen Dr. Moon who is recommending DBS workup.  Patient states that she is very familiar with DBS and she has been \"following\" the therapy and its development for quite some time.  Her goal for DBS is to better control her tremor, to be able to eat without spilling and to put makeup on without poking herself in the eye.  She also would like to be able to play with her grandchildren without having tremor.  Her tremor is bilateral and she would like her left side treated first.  She would like a rechargeable generator/battery and her first choice is Medtronic, followed by [employer] being the second choice.  We discussed the potential outcome of the DBS candidacy evaluation.  Upon completion of the evaluation, patient's case will be dicussed at the Movement Disorder Consensus Group Meeting.  Patient may not be considered to be a good candidate.  If that is the case, the group will provide a reason for our recommendation against DBS.  Patient may also be considered to be a good candidate and wait and see strategy may be recommended.  Patient may also be considered to be a good candidate and staged bilateral strategy may be recommended.  We discussed both phase I and II of DBS surgery.  We discussed that during phase I, we would place the DBS electrode on the right side under MAC and microelectrode recording.  She would then return one week later for the phase II with placement of the DBS generator/battery over the right chest wall under general anesthesia.  If she is a unilateral candidate or wait and see strategy candidate, then she will undergo another evaluation/discussion prior to proceeding to the left side " implantation.  If she is a bilateral candidate in a staged fashion, she would return three weeks later for the phase I and II combined for the placement of the DBS electrode on the left side under MAC and microelectrode recording followed by connection to the previously implanted generator/battery.  Risks, benefits, alternative therapies were discussed with the patient, including but not limited to infection and bleeding (intracranial), injury to the brain, stroke, seizure, death, hardware failure and possible need for more surgeries.  Surgical procedure was discussed in detail.  All questions were answered, and she expressed understanding.  Her case was discussed at the Movement Disorder Consensus Group Meeting and she was considered to be a good candidate for DBS surgery.  Recommendation was staged bilateral protocol, right side implantation first, target right then left Vim, and Medtronic device with rechargeable generator/battery over the right chest wall.  We discussed the phase I and II combined for the placement of the DBS electrode on the left side under MAC and microelectrode recording followed by connection to the previously implanted generator/battery.  Risks, benefits, alternative therapies were discussed with the patient, including but not limited to infection and bleeding (intracranial), injury to the brain, stroke, seizure, death, hardware failure and possible need for more surgeries.  Surgical procedure was discussed in detail.  All questions were answered, and she expressed understanding.     DESCRIPTION OF PROCEDURE:      CRW head frame placement and stereotactic neuronavigation.      Informed consent was obtained.  The patient was brought to the operating room and placed in a sitting position in her bed.  Brief timeout was performed for placement of the CRW head frame.  She was given sedation to make her comfortable.  Intended pin sites, two in the front and two in the back of the head, were  cleaned and were injected with local anesthetic, 1% Lidocaine with epinephrine.  Total of 20 mL were used.  Then, CRW stereotactic head frame was placed onto her head with the four pins for fixation.  Care was taken so that the fiducial box would fit over the head and the frame.  Also, posterior right pin site was carefully chosen to stay away from the buried extension wires.  Once the head frame was on, the fiducial box was easily placed without interference from her forehead.  Ha catheter was also placed.  The patient tolerated the procedure well and her sedation was lightened and she was awakened.     After the CRW stereotactic head frame was placed, she was taken directly to the CT scanner, at which time a CT scan of the head stereotactic protocol was obtained.  Subsequently, the patient was taken back up to the operating room where she was placed supine on the operative bed with the CRW head frame affixed to the bed as well.  Patient was in a slight sitting up position with the neck in a neutral position and she was made comfortable.  The bed was positioned so that it would be a beach chair reclining position (head up, legs down, body trendelenburg).  All pressure points were well padded.  Care was taken to make sure that the neck was in neutral position and that the [place] head maxwell device had room for manipulation in case more flexion or extension is needed throughout the case.     At this time, attention was turned to the neuronavigation imaging that was obtained.  The [employer] neuronavigation device was loaded with the CT head that had just been obtained.  The device also had an MRI of the head, stereotactic protocol, that was obtained prior to surgery.  CT of the head was merged with the previously obtained MRI of the brain.  The merge demonstrated good coherence/registration.  Then, using this merged image, neuronavigation was used to stereotactically target the left ventral intermediate nucleus of  "the thalamus (Vim).  The technique used was the AC-PC line localization technique to indirectly target the Vim using stereotactic coordinates and the X, Y and Z as well as the ring and arc angles for the CRW head frame were obtained for the left side.  The distance from the wall of the 3rd ventricle was approximately 10.2 mm.  The entry into the skull, as well as the trajectory of the electrode were checked with a probe's eye view to avoid any sulci, ventricle or vascular structures.     The stereotactic coordinates for the left side was then transferred to the [facility] stereotactic targeting apparatus as well as the phantom base.  The coordinates were confirmed and double checked for accuracy.  Accuracy was also confirmed using phantom base.  The coordinates were X = -14.1, Y = -15.0, Z = -2.7, ring angle = 62.2 degrees anterior, and arc angle = 15.3 degrees to the left.      At this time, attention was turned to the patient.  Using a hair clipper, her hair over the left frontal area was clipped using a surgical clipper.  Her hair over the right parietal area, location of the previously implanted extension wire, was also clipped using a surgical clipper.  Patient has a history of plastic surgery, a brow lift procedure, so she she has a curvilinear bicoronal bifrontal incision.  Therefore, a semicircular incision was not ideal, as it would leave an \"island\" of skin flap.  Therefore, a curved incision which crosses the existing incision and opening towards the left side was planned, which matches her previous right side incision.  This would preserve the blood supply to the scalp flap in all areas.  Then, the surgical area was prepped and draped in routine surgical fashion.  We also prepped the area posterior to this as well as area towards the right parietal area connector portion of the extension wire.  The patient was also made comfortable.    Time out was then performed confirming the patient's identity, procedure to " be performed, side and site of the surgery identified, imaging corresponding with the patient and the administration of preoperative prophylactic antibiotic.  Her right side DBS system was turned off.    Left-sided electrode placement with microelectrode recording: target left Vim.    The CRW targeting apparatus was attached to the head frame on top of the sterile drapes and the entry point was marked on the scalp on the left side.  As noted, the entry point was near the existing bicoronal bifrontal incision.  A curved incision as described above was made with a skin knife, after the area was injected with local anesthetic, 1% Lidocaine with epinephrine and 1/4% Marcaine plain, 50:50 mix.  The area posterior to the incision was also injected as a pocket would be created.  Area posterior medial, towards the right parietal area, was also injected as the electrode connector will be tunneled here later.  Total of 19 mL of the above mentioned local anesthetic was used.  The incision was then further carried down to the pericranium.  Hemostasis was obtained using bipolar cautery.  Thin layer of pericranial tissue was left behind on the scalp and the skin flap was retracted.  Of note, as a postsurgical site, there was minimal pericranial layer.  Then, using a blunt dissection technique, a pocket was created posteriorly as well as a track made towards the right parietal area for later use.  The targeting apparatus was again positioned and the entry point into the skull was marked.  The pericranial tissue was dissected using monopolar cautery.  A drill with acorn bit was used to make a jean hole at the entry point.  The area was vigorously irrigated and bone wax was used to control the bleeding.  A small curette followed by a Kerrison punch was used to clean up the remaining bone around the inner table of the jean hole.  The underlying dura was coagulated with the bipolar cautery.  The electrode fixation cover was affixed to  the skull using two new screws.  Care was taken to overlap the pericranium over the edge of the cover to provide a smooth tissue transition.  The electrode  was attached to the targeting apparatus and we verified the entry into the dura.  It appeared that all positions of the Jose G Gun electrode maxwell were accessible without any interference from the bone edges.  Using a Bovie cautery and a #4 Penfield instrument, an opening was made in the dura as well as a small corticectomy.  We verified there was no active bleeding at this point.     Dr. Fish Lopez and Dr. Salvatore Leigh from the Neurology Department participated in the microelectrode recording and neurological testing.  During the microelectrode recording and testing, they took detailed notes of the electrophysiologic and neurologic exam as well as any stimulation effects.      The electrode guide tubes were inserted in the center and posterior Jose G Gun positions and were inserted into the brain and advanced slowly until at which point the tip would be well above the target.  No abnormal resistance was noted.  DuraSeal was used to seal the entry point to the dura and minimize cerebrospinal fluid leak and air entry.  The recording microelectrodes were then placed into the guide tubes and connected for intraoperative recording.  The tips of the electrodes were now 15 mm above the target.  The patient was awake at this time for serial neurologic exams as we advanced the electrodes slowly towards the target.  The microdrive was used to advance the electrodes slowly and allow for microelectrode recording data to be collected for mapping of the tract.  Throughout the track, dense robust activity was noted in both tracks, mostly in the ventral tier.  Posterior track was more robust than the center track.  The posterior track yielded somatosensory response activity related to the upper limb.  Vc was identified with the posterior track.  Vc was not  identified with the center track.  Semi-micro stimulation showed relatively low threshold side effects in the center track.      Based on the electrophysiological data collected, it was decided that the best location for the electrode may be the current medial track.  Decision was made to place a microelectrode in the medial track at the desired depth without electrophysiology mapping and perform semi-micro stimulation.  The electrodes were pulled out of the guide tubes.  Then, the posterior electrode guide tube was pulled out of the brain.  Then, this guide tube was inserted in the medial Jose G Gun position and was inserted into the brain and advanced slowly until at which point the tip would be well above the target.  No abnormal resistance was noted.  Duraseal was used to seal the entry site to provide a seal and to minimize cerebrospinal fluid leak and air entry.  Then, microelectrode was inserted in the medial guide tube to the depth of 1.0 mm above the target level.  The threshold for side effects were improved.    Based on the stimulation data, it was decided that the current medial Jose G Gun position may be the best placement for the permanent electrode.  The electrode drive was positioned at the desired position at the 1.0 mm above the target and the microelectrode was pulled out of the guide tube.  Of note, the microdrive read and the actual depth were not congruent due to possibly the calibration being off.  The actual depth is noted to be 0.5 mm below the target level.  The permanent DBS electrode was brought into the field.  It was first tested in normal saline and the impedance values were within normal.  The electrode was then placed in the medial guide tube with the tip being placed at 1.0 mm above the target depth which in actually was 0.5 mm below the target level.  Electrode was also positioned so that the contact 1A would be facing anteriorly.  The electrode demonstrated good impedance values.  The  stimulation yielded side effect thresholds which were improved and acceptable.  Tremor also improved.  Based on the findings, it was decided that this was a good location for the DBS electrode.    With the satisfactory testing of the electrode position and the stimulation parameters, the electrode was secured at this location.  The patient was again made comfortable.  The guide tubes were pulled out of the brain.  Duraseal was again used to seal any opening.  The area was generously irrigated.  Hemostasis was also obtained.  Fluoroscopy was brought into the field to test that the electrode did not move with each manipulation.  The electrode tip was seen to be at below the target level, as expected.  The electrode clamp was applied to hold the electrode.  Then, the electrode stylet was removed.  The electrode was then removed from the electrode maxwell.  The cap cover was finally placed and secured in place.  Fluoroscopy confirmed that the tip of the electrode did not change in position with each manipulation.  The directional marker, on fluoroscopy, also demonstrated that the contact 1A is facing anteriorly.    Connection of the left side deep brain stimulator electrode, single electrode array, to the generator/battery.    At this time, attention was turned to the right parietal area where the previously buried connector portion of the extension wire, intended for this left side, was located.  This area was also injected with local anesthetic that was previously mentioned.  Using a #10 blade, 4 cm incision was made over the buried connector.  Care was taken to only expose the connector and not harm the hardware around it.  The connector was visualized.  The dead end plug was removed.    Then, using a tunneler made out of the cannula, a passage was created between the left frontal incision and right parietal incision.  Using this, the newly placed left side DBS electrode was tunneled to the extension wire connector  site.  Then the tunneler was also pulled out, thus leaving the tunneled left side wire.  The connection was cleaned.  Then, the newly placed left Vim electrode was connected to the previously buried extension wire and secured with a screwdriver.  Then, the connector was buried within the right parietal pocket.  From the left frontal incision, excess wire was placed posterior to the incision, in the pocket previously created.  Final fluoroscopy image confirmed that the tip did not move.  Both wounds were then generously irrigated and hemostasis was obtained.    With the proper placement and connection of the deep brain stimulator system, electrical interrogation and analysis was performed.  All the impedance values were noted to be within normal.    We began closing the wound.  The left frontal incision was reapproximated first.  The galeal layer was reapproximated using 3-0 Vicryl sutures in an inverted interrupted fashion and the skin was reapproximated using 4-0 Monocryl suture in a running fashion.  The wound was cleaned and dried, reprepped with ChoraPrep and Dermabond was applied.  Attention was then turned to the right parietal area.  The galeal layer was reapproximated using 3-0 Vicryl sutures in an inverted interrupted fashion and the skin was reapproximated using 4-0 Monocryl suture in a running fashion.  The wound was cleaned and dried, reprepped with ChoraPrep and Dermabond was applied.    Removal of the head frame and end of procedure.    First, the stereotactic targeting apparatus was removed.  Then, the sterile drapes were removed.  Subsequently, the patient was taken out of the CRW head frame.  The four pin sites were clean and dry and no active bleeding was noted.  Antibiotic ointment was applied to the pin sites.  Two small [place] dressings were applied to the two frontal pin sites.    Patient was further awakened and taken to the recovery room in a stable condition.  At the end of the case, all  counts including needle, sponge and instrument counts were correct x 2.  There were no complications.    Patient's DBS for the right side was turned on at the end of the case.    IBrynn M.D., Ph.D., Neurosurgery Attending, was present and scrubbed for the entire case and performed the key and critical portions of the case.      BRYNN ANDUJAR MD, PHD

## (undated) DEVICE — PREP CHLORAPREP CLEAR 3ML 930400

## (undated) DEVICE — PREP POVIDONE-IODINE 7.5% SCRUB 4OZ BOTTLE MDS093945

## (undated) DEVICE — SPONGE COTTONOID 1/2X1/2" 20-04S

## (undated) DEVICE — GLOVE BIOGEL PI MICRO SZ 7.5 48575

## (undated) DEVICE — NDL 25GA 2"  8881200441

## (undated) DEVICE — SU MONOCRYL 4-0 PS-2 27" UND Y426H

## (undated) DEVICE — SOL NACL 0.9% IRRIG 1000ML BOTTLE 2F7124

## (undated) DEVICE — NDL BLUNT 18GA 1" W/O FILTER 305181

## (undated) DEVICE — PACK NEURO MINOR UMMC SNE32MNMU4

## (undated) DEVICE — OINTMENT ANTIBIOTIC BACITRACIN ZINC .9 G 1171

## (undated) DEVICE — JELLY LUBRICATING SURGILUBE 2OZ TUBE

## (undated) DEVICE — SPONGE SURGIFOAM 100 1974

## (undated) DEVICE — GLOVE BIOGEL PI MICRO INDICATOR UNDERGLOVE SZ 8.0 48980

## (undated) DEVICE — SOL WATER IRRIG 1000ML BOTTLE 2F7114

## (undated) DEVICE — LINEN TOWEL PACK X30 5481

## (undated) DEVICE — HEADRING POINTS STEREOTACTIC DHRSL

## (undated) DEVICE — PACK SET-UP STD 9102

## (undated) DEVICE — PREP CHLORAPREP 26ML TINTED HI-LITE ORANGE 930815

## (undated) DEVICE — PREP SKIN SCRUB TRAY 4461A

## (undated) DEVICE — SUCTION MANIFOLD NEPTUNE 2 SYS 4 PORT 0702-020-000

## (undated) DEVICE — SU DERMABOND ADVANCED .7ML DNX12

## (undated) DEVICE — PROGRAMMER MEDT COMM HANDSET INTERSTIM PERCPT TH91DBS

## (undated) DEVICE — DRSG GAUZE 4X4" TRAY 6939

## (undated) DEVICE — CABLE 5 CHANNEL INPUT  ALPHA OMEGA SYSTEM STR-000642-55

## (undated) DEVICE — BUR STRK PRECISION ACORN 7.5MM 5220-030-575

## (undated) DEVICE — SU ETHIBOND 2-0 SHDA 30" X563H

## (undated) DEVICE — ELEC MICROPHONICS-FREE ALPHA OMEGA STR-009080-00

## (undated) DEVICE — SU VICRYL 3-0 SH 8X18" UND J864D

## (undated) DEVICE — DRSG PRIMAPORE 02X3" 7133

## (undated) DEVICE — LINEN TOWEL PACK X6 WHITE 5487

## (undated) DEVICE — PACK GOWN 3/PK DISP XL SBA32GPFCB

## (undated) DEVICE — SYR 10ML FINGER CONTROL W/O NDL 309695

## (undated) DEVICE — BLADE CLIPPER 4412A

## (undated) DEVICE — LABEL MEDICATION SYSTEM 3303-P

## (undated) DEVICE — DRAPE C-ARM W/STRAPS 42X72" 07-CA104

## (undated) DEVICE — COMB STERILE 7" PLASTIC 14-1200

## (undated) DEVICE — TUBE GUIDE ALPHA OMEGA SYSTEM STR-007721-00

## (undated) DEVICE — DRAPE IOBAN ISOLATION VERTICAL 320X21CM 6617

## (undated) DEVICE — DRAPE POUCH INSTRUMENT 1018

## (undated) DEVICE — DRAPE SHEET REV FOLD 3/4 9349

## (undated) DEVICE — CATH TRAY FOLEY SURESTEP 16FR W/TMP PRB STLK LATEX A319416AM

## (undated) DEVICE — PREP POVIDONE-IODINE 10% SOLUTION 4OZ BOTTLE MDS093944

## (undated) DEVICE — GLOVE PROTEXIS BLUE W/NEU-THERA 8.0  2D73EB80

## (undated) DEVICE — DRAPE MAYO STAND 23X54 8337

## (undated) DEVICE — ESU GROUND PAD ADULT REM W/15' CORD E7507DB

## (undated) DEVICE — SU VICRYL+ 0 27IN CT-1 UND VCP260H

## (undated) DEVICE — BONE WAX 2.5GM W31G

## (undated) DEVICE — TUBING SMOKE EVACUATOR 7/8"X10' W/WAND 0700-026-000

## (undated) DEVICE — SU VICRYL 0 CT-1 27" UND J260H

## (undated) DEVICE — VIAL DECANTER STERILE WHITE DYNJDEC06

## (undated) DEVICE — NEUROSTIMULATOR RECHARGER MEDTRONIC PERCEPT RC DBS US RS6230

## (undated) DEVICE — PAD CHUX UNDERPAD 23X36" 676105

## (undated) DEVICE — SENSIGHT LEAD TEST CABLE KIT

## (undated) DEVICE — SUTURE BOOTS 051003PBX

## (undated) DEVICE — ESU HOLSTER PLASTIC DISP E2400

## (undated) DEVICE — SENSIGHT EXTENSION TUNNELER KIT

## (undated) DEVICE — LINEN TOWEL PACK X5 5464

## (undated) DEVICE — ESU GROUND PAD ADULT W/CORD E7507

## (undated) DEVICE — WIPES FOLEY CARE SURESTEP PROVON DFC100

## (undated) RX ORDER — BUPIVACAINE HYDROCHLORIDE 2.5 MG/ML
INJECTION, SOLUTION EPIDURAL; INFILTRATION; INTRACAUDAL; PERINEURAL
Status: DISPENSED
Start: 2025-06-20

## (undated) RX ORDER — ACETAMINOPHEN 325 MG/1
TABLET ORAL
Status: DISPENSED
Start: 2025-05-13

## (undated) RX ORDER — HYDROMORPHONE HCL IN WATER/PF 6 MG/30 ML
PATIENT CONTROLLED ANALGESIA SYRINGE INTRAVENOUS
Status: DISPENSED
Start: 2025-05-13

## (undated) RX ORDER — ONDANSETRON 2 MG/ML
INJECTION INTRAMUSCULAR; INTRAVENOUS
Status: DISPENSED
Start: 2025-05-22

## (undated) RX ORDER — CEFAZOLIN SODIUM/WATER 2 G/20 ML
SYRINGE (ML) INTRAVENOUS
Status: DISPENSED
Start: 2025-06-20

## (undated) RX ORDER — FENTANYL CITRATE 50 UG/ML
INJECTION, SOLUTION INTRAMUSCULAR; INTRAVENOUS
Status: DISPENSED
Start: 2025-05-13

## (undated) RX ORDER — FENTANYL CITRATE 50 UG/ML
INJECTION, SOLUTION INTRAMUSCULAR; INTRAVENOUS
Status: DISPENSED
Start: 2025-06-20

## (undated) RX ORDER — FENTANYL CITRATE 50 UG/ML
INJECTION, SOLUTION INTRAMUSCULAR; INTRAVENOUS
Status: DISPENSED
Start: 2025-05-22

## (undated) RX ORDER — LIDOCAINE HYDROCHLORIDE AND EPINEPHRINE 10; 10 MG/ML; UG/ML
INJECTION, SOLUTION INFILTRATION; PERINEURAL
Status: DISPENSED
Start: 2025-06-20

## (undated) RX ORDER — PROPOFOL 10 MG/ML
INJECTION, EMULSION INTRAVENOUS
Status: DISPENSED
Start: 2025-05-22

## (undated) RX ORDER — ONDANSETRON 2 MG/ML
INJECTION INTRAMUSCULAR; INTRAVENOUS
Status: DISPENSED
Start: 2025-05-13

## (undated) RX ORDER — SODIUM CHLORIDE, SODIUM LACTATE, POTASSIUM CHLORIDE, CALCIUM CHLORIDE 600; 310; 30; 20 MG/100ML; MG/100ML; MG/100ML; MG/100ML
INJECTION, SOLUTION INTRAVENOUS
Status: DISPENSED
Start: 2025-05-13

## (undated) RX ORDER — CEFAZOLIN SODIUM/WATER 2 G/20 ML
SYRINGE (ML) INTRAVENOUS
Status: DISPENSED
Start: 2025-05-13

## (undated) RX ORDER — HYDROMORPHONE HYDROCHLORIDE 1 MG/ML
INJECTION, SOLUTION INTRAMUSCULAR; INTRAVENOUS; SUBCUTANEOUS
Status: DISPENSED
Start: 2025-05-22

## (undated) RX ORDER — LIDOCAINE HYDROCHLORIDE AND EPINEPHRINE 10; 10 MG/ML; UG/ML
INJECTION, SOLUTION INFILTRATION; PERINEURAL
Status: DISPENSED
Start: 2025-05-22

## (undated) RX ORDER — DEXAMETHASONE SODIUM PHOSPHATE 4 MG/ML
INJECTION, SOLUTION INTRA-ARTICULAR; INTRALESIONAL; INTRAMUSCULAR; INTRAVENOUS; SOFT TISSUE
Status: DISPENSED
Start: 2025-05-22

## (undated) RX ORDER — PROPOFOL 10 MG/ML
INJECTION, EMULSION INTRAVENOUS
Status: DISPENSED
Start: 2025-06-20

## (undated) RX ORDER — SODIUM CHLORIDE 9 MG/ML
INJECTION, SOLUTION INTRAVENOUS
Status: DISPENSED
Start: 2025-06-20

## (undated) RX ORDER — SODIUM CHLORIDE 9 MG/ML
INJECTION, SOLUTION INTRAVENOUS
Status: DISPENSED
Start: 2025-05-13

## (undated) RX ORDER — BUPIVACAINE HYDROCHLORIDE 2.5 MG/ML
INJECTION, SOLUTION EPIDURAL; INFILTRATION; INTRACAUDAL; PERINEURAL
Status: DISPENSED
Start: 2025-05-22